# Patient Record
Sex: MALE | Race: WHITE | Employment: OTHER | URBAN - METROPOLITAN AREA
[De-identification: names, ages, dates, MRNs, and addresses within clinical notes are randomized per-mention and may not be internally consistent; named-entity substitution may affect disease eponyms.]

---

## 2022-01-25 ENCOUNTER — HOSPITAL ENCOUNTER (INPATIENT)
Age: 87
LOS: 1 days | Discharge: ANOTHER HOSPITAL | DRG: 682 | End: 2022-01-26
Attending: EMERGENCY MEDICINE | Admitting: HOSPITALIST
Payer: MEDICARE

## 2022-01-25 DIAGNOSIS — N17.9 ACUTE KIDNEY INJURY (HCC): ICD-10-CM

## 2022-01-25 DIAGNOSIS — E87.5 ACUTE HYPERKALEMIA: Primary | ICD-10-CM

## 2022-01-25 PROBLEM — I10 HYPERTENSION: Status: ACTIVE | Noted: 2022-01-25

## 2022-01-25 PROBLEM — E11.9 DIABETES MELLITUS TYPE 2, CONTROLLED (HCC): Status: ACTIVE | Noted: 2022-01-25

## 2022-01-25 LAB
ALBUMIN SERPL-MCNC: 3.8 G/DL (ref 3.2–4.6)
ALBUMIN/GLOB SERPL: 1.1 {RATIO} (ref 1.2–3.5)
ALP SERPL-CCNC: 56 U/L (ref 50–136)
ALT SERPL-CCNC: 23 U/L (ref 12–65)
ANION GAP SERPL CALC-SCNC: 4 MMOL/L (ref 7–16)
APPEARANCE UR: CLEAR
AST SERPL-CCNC: 27 U/L (ref 15–37)
BILIRUB SERPL-MCNC: 0.2 MG/DL (ref 0.2–1.1)
BILIRUB UR QL: NEGATIVE
BUN SERPL-MCNC: 48 MG/DL (ref 8–23)
CALCIUM SERPL-MCNC: 8.8 MG/DL (ref 8.3–10.4)
CHLORIDE SERPL-SCNC: 115 MMOL/L (ref 98–107)
CO2 SERPL-SCNC: 20 MMOL/L (ref 21–32)
COLOR UR: YELLOW
CREAT SERPL-MCNC: 2.1 MG/DL (ref 0.8–1.5)
GLOBULIN SER CALC-MCNC: 3.4 G/DL (ref 2.3–3.5)
GLUCOSE BLD STRIP.AUTO-MCNC: 156 MG/DL (ref 65–100)
GLUCOSE BLD STRIP.AUTO-MCNC: 163 MG/DL (ref 65–100)
GLUCOSE BLD STRIP.AUTO-MCNC: 69 MG/DL (ref 65–100)
GLUCOSE SERPL-MCNC: 216 MG/DL (ref 65–100)
GLUCOSE UR STRIP.AUTO-MCNC: NEGATIVE MG/DL
HGB UR QL STRIP: NEGATIVE
KETONES UR QL STRIP.AUTO: NEGATIVE MG/DL
LEUKOCYTE ESTERASE UR QL STRIP.AUTO: NEGATIVE
NITRITE UR QL STRIP.AUTO: NEGATIVE
PH UR STRIP: 5.5 [PH] (ref 5–9)
POTASSIUM SERPL-SCNC: 6.7 MMOL/L (ref 3.5–5.1)
PROT SERPL-MCNC: 7.2 G/DL (ref 6.3–8.2)
PROT UR STRIP-MCNC: NEGATIVE MG/DL
SERVICE CMNT-IMP: ABNORMAL
SERVICE CMNT-IMP: ABNORMAL
SERVICE CMNT-IMP: NORMAL
SODIUM SERPL-SCNC: 139 MMOL/L (ref 136–145)
SP GR UR REFRACTOMETRY: 1.02 (ref 1–1.02)
UROBILINOGEN UR QL STRIP.AUTO: 0.2 EU/DL (ref 0.2–1)

## 2022-01-25 PROCEDURE — 65270000029 HC RM PRIVATE

## 2022-01-25 PROCEDURE — 96361 HYDRATE IV INFUSION ADD-ON: CPT

## 2022-01-25 PROCEDURE — 96375 TX/PRO/DX INJ NEW DRUG ADDON: CPT

## 2022-01-25 PROCEDURE — 81003 URINALYSIS AUTO W/O SCOPE: CPT

## 2022-01-25 PROCEDURE — 80053 COMPREHEN METABOLIC PANEL: CPT

## 2022-01-25 PROCEDURE — 93005 ELECTROCARDIOGRAM TRACING: CPT | Performed by: NURSE PRACTITIONER

## 2022-01-25 PROCEDURE — 74011250636 HC RX REV CODE- 250/636: Performed by: HOSPITALIST

## 2022-01-25 PROCEDURE — 94664 DEMO&/EVAL PT USE INHALER: CPT

## 2022-01-25 PROCEDURE — 74011000250 HC RX REV CODE- 250: Performed by: EMERGENCY MEDICINE

## 2022-01-25 PROCEDURE — 96372 THER/PROPH/DIAG INJ SC/IM: CPT

## 2022-01-25 PROCEDURE — 94640 AIRWAY INHALATION TREATMENT: CPT

## 2022-01-25 PROCEDURE — 82962 GLUCOSE BLOOD TEST: CPT

## 2022-01-25 PROCEDURE — 74011636637 HC RX REV CODE- 636/637: Performed by: EMERGENCY MEDICINE

## 2022-01-25 PROCEDURE — 99285 EMERGENCY DEPT VISIT HI MDM: CPT

## 2022-01-25 PROCEDURE — 96365 THER/PROPH/DIAG IV INF INIT: CPT

## 2022-01-25 PROCEDURE — 96366 THER/PROPH/DIAG IV INF ADDON: CPT

## 2022-01-25 PROCEDURE — 74011250636 HC RX REV CODE- 250/636: Performed by: NURSE PRACTITIONER

## 2022-01-25 PROCEDURE — 74011250636 HC RX REV CODE- 250/636: Performed by: EMERGENCY MEDICINE

## 2022-01-25 RX ORDER — ONDANSETRON 2 MG/ML
4 INJECTION INTRAMUSCULAR; INTRAVENOUS
Status: DISCONTINUED | OUTPATIENT
Start: 2022-01-25 | End: 2022-01-26

## 2022-01-25 RX ORDER — ACETAMINOPHEN 325 MG/1
650 TABLET ORAL
Status: DISCONTINUED | OUTPATIENT
Start: 2022-01-25 | End: 2022-01-26

## 2022-01-25 RX ORDER — POLYETHYLENE GLYCOL 3350 17 G/17G
17 POWDER, FOR SOLUTION ORAL DAILY PRN
Status: DISCONTINUED | OUTPATIENT
Start: 2022-01-25 | End: 2022-01-26

## 2022-01-25 RX ORDER — ACETAMINOPHEN 650 MG/1
650 SUPPOSITORY RECTAL
Status: DISCONTINUED | OUTPATIENT
Start: 2022-01-25 | End: 2022-01-26

## 2022-01-25 RX ORDER — HEPARIN SODIUM 5000 [USP'U]/ML
5000 INJECTION, SOLUTION INTRAVENOUS; SUBCUTANEOUS EVERY 12 HOURS
Status: DISCONTINUED | OUTPATIENT
Start: 2022-01-25 | End: 2022-01-26

## 2022-01-25 RX ORDER — CALCIUM GLUCONATE 20 MG/ML
1 INJECTION, SOLUTION INTRAVENOUS ONCE
Status: COMPLETED | OUTPATIENT
Start: 2022-01-25 | End: 2022-01-25

## 2022-01-25 RX ORDER — CALCIUM GLUCONATE 94 MG/ML
1 INJECTION, SOLUTION INTRAVENOUS ONCE
Status: DISCONTINUED | OUTPATIENT
Start: 2022-01-25 | End: 2022-01-25 | Stop reason: SDUPTHER

## 2022-01-25 RX ORDER — SODIUM BICARBONATE 1 MEQ/ML
50 SYRINGE (ML) INTRAVENOUS
Status: COMPLETED | OUTPATIENT
Start: 2022-01-25 | End: 2022-01-25

## 2022-01-25 RX ORDER — DEXTROSE MONOHYDRATE 100 MG/ML
125-250 INJECTION, SOLUTION INTRAVENOUS ONCE
Status: COMPLETED | OUTPATIENT
Start: 2022-01-25 | End: 2022-01-25

## 2022-01-25 RX ORDER — ONDANSETRON 4 MG/1
4 TABLET, ORALLY DISINTEGRATING ORAL
Status: DISCONTINUED | OUTPATIENT
Start: 2022-01-25 | End: 2022-01-26

## 2022-01-25 RX ORDER — INSULIN LISPRO 100 [IU]/ML
INJECTION, SOLUTION INTRAVENOUS; SUBCUTANEOUS
Status: DISCONTINUED | OUTPATIENT
Start: 2022-01-26 | End: 2022-01-26

## 2022-01-25 RX ORDER — ALBUTEROL SULFATE 0.83 MG/ML
5 SOLUTION RESPIRATORY (INHALATION) EVERY 8 HOURS
Status: DISCONTINUED | OUTPATIENT
Start: 2022-01-25 | End: 2022-01-26 | Stop reason: HOSPADM

## 2022-01-25 RX ORDER — SODIUM CHLORIDE 9 MG/ML
100 INJECTION, SOLUTION INTRAVENOUS CONTINUOUS
Status: DISCONTINUED | OUTPATIENT
Start: 2022-01-25 | End: 2022-01-26

## 2022-01-25 RX ORDER — SODIUM CHLORIDE 0.9 % (FLUSH) 0.9 %
5-40 SYRINGE (ML) INJECTION AS NEEDED
Status: DISCONTINUED | OUTPATIENT
Start: 2022-01-25 | End: 2022-01-26

## 2022-01-25 RX ORDER — SODIUM CHLORIDE 0.9 % (FLUSH) 0.9 %
5-40 SYRINGE (ML) INJECTION EVERY 8 HOURS
Status: DISCONTINUED | OUTPATIENT
Start: 2022-01-25 | End: 2022-01-26

## 2022-01-25 RX ADMIN — SODIUM BICARBONATE 50 MEQ: 84 INJECTION, SOLUTION INTRAVENOUS at 19:31

## 2022-01-25 RX ADMIN — SODIUM CHLORIDE 1000 ML: 900 INJECTION, SOLUTION INTRAVENOUS at 19:36

## 2022-01-25 RX ADMIN — ALBUTEROL SULFATE 5 MG: 2.5 SOLUTION RESPIRATORY (INHALATION) at 20:28

## 2022-01-25 RX ADMIN — HUMAN INSULIN 10 UNITS: 100 INJECTION, SOLUTION SUBCUTANEOUS at 19:34

## 2022-01-25 RX ADMIN — HEPARIN SODIUM 5000 UNITS: 5000 INJECTION, SOLUTION INTRAVENOUS; SUBCUTANEOUS at 22:48

## 2022-01-25 RX ADMIN — CALCIUM GLUCONATE 1000 MG: 20 INJECTION, SOLUTION INTRAVENOUS at 20:47

## 2022-01-25 RX ADMIN — SODIUM CHLORIDE 100 ML/HR: 900 INJECTION, SOLUTION INTRAVENOUS at 22:47

## 2022-01-25 RX ADMIN — DEXTROSE MONOHYDRATE 250 ML: 100 INJECTION, SOLUTION INTRAVENOUS at 19:08

## 2022-01-25 NOTE — ED NOTES
Shalini Briseno is an 81yoM, here for elevated potassium of 7.5, noted on blood work at PCP today. Reports feeling weak and fatigued last week at work. Also had blood in his urine about 10 days ago. Otherwise no complaints today, no palpitations or CP. Well appearing, normal vitals. Patient evaluated initially in triage. Rapid Medical Evaluation was conducted and necessary orders have been placed. I have performed a medical screening exam.  Care will now be transferred to the provider in the back of the emergency department.   Jw Hill NP 6:17 PM

## 2022-01-26 ENCOUNTER — HOSPITAL ENCOUNTER (INPATIENT)
Age: 87
LOS: 2 days | Discharge: HOME HEALTH | DRG: 682 | End: 2022-01-28
Attending: HOSPITALIST | Admitting: HOSPITALIST
Payer: MEDICARE

## 2022-01-26 VITALS
HEIGHT: 72 IN | BODY MASS INDEX: 24.38 KG/M2 | HEART RATE: 91 BPM | DIASTOLIC BLOOD PRESSURE: 62 MMHG | TEMPERATURE: 98 F | OXYGEN SATURATION: 95 % | RESPIRATION RATE: 19 BRPM | SYSTOLIC BLOOD PRESSURE: 128 MMHG | WEIGHT: 180 LBS

## 2022-01-26 PROBLEM — D64.9 NORMOCYTIC ANEMIA: Status: ACTIVE | Noted: 2022-01-26

## 2022-01-26 LAB
ALBUMIN SERPL-MCNC: 3.2 G/DL (ref 3.2–4.6)
ALBUMIN/GLOB SERPL: 1 {RATIO} (ref 1.2–3.5)
ALP SERPL-CCNC: 43 U/L (ref 50–136)
ALT SERPL-CCNC: 22 U/L (ref 12–65)
ANION GAP SERPL CALC-SCNC: 6 MMOL/L (ref 7–16)
AST SERPL-CCNC: 23 U/L (ref 15–37)
ATRIAL RATE: 79 BPM
BASOPHILS # BLD: 0 K/UL (ref 0–0.2)
BASOPHILS NFR BLD: 1 % (ref 0–2)
BILIRUB SERPL-MCNC: 0.3 MG/DL (ref 0.2–1.1)
BUN SERPL-MCNC: 39 MG/DL (ref 8–23)
CALCIUM SERPL-MCNC: 8.4 MG/DL (ref 8.3–10.4)
CALCULATED P AXIS, ECG09: 82 DEGREES
CALCULATED R AXIS, ECG10: 72 DEGREES
CALCULATED T AXIS, ECG11: 77 DEGREES
CHLORIDE SERPL-SCNC: 115 MMOL/L (ref 98–107)
CO2 SERPL-SCNC: 20 MMOL/L (ref 21–32)
CREAT SERPL-MCNC: 1.58 MG/DL (ref 0.8–1.5)
DIAGNOSIS, 93000: NORMAL
DIFFERENTIAL METHOD BLD: ABNORMAL
EOSINOPHIL # BLD: 0.2 K/UL (ref 0–0.8)
EOSINOPHIL NFR BLD: 3 % (ref 0.5–7.8)
ERYTHROCYTE [DISTWIDTH] IN BLOOD BY AUTOMATED COUNT: 14.9 % (ref 11.9–14.6)
GLOBULIN SER CALC-MCNC: 3.2 G/DL (ref 2.3–3.5)
GLUCOSE BLD STRIP.AUTO-MCNC: 119 MG/DL (ref 65–100)
GLUCOSE BLD STRIP.AUTO-MCNC: 158 MG/DL (ref 65–100)
GLUCOSE SERPL-MCNC: 104 MG/DL (ref 65–100)
HCT VFR BLD AUTO: 30.4 % (ref 41.1–50.3)
HGB BLD-MCNC: 9.5 G/DL (ref 13.6–17.2)
IMM GRANULOCYTES # BLD AUTO: 0.1 K/UL (ref 0–0.5)
IMM GRANULOCYTES NFR BLD AUTO: 2 % (ref 0–5)
LYMPHOCYTES # BLD: 1.1 K/UL (ref 0.5–4.6)
LYMPHOCYTES NFR BLD: 21 % (ref 13–44)
MAGNESIUM SERPL-MCNC: 2.1 MG/DL (ref 1.8–2.4)
MCH RBC QN AUTO: 30.3 PG (ref 26.1–32.9)
MCHC RBC AUTO-ENTMCNC: 31.3 G/DL (ref 31.4–35)
MCV RBC AUTO: 96.8 FL (ref 79.6–97.8)
MONOCYTES # BLD: 0.5 K/UL (ref 0.1–1.3)
MONOCYTES NFR BLD: 9 % (ref 4–12)
NEUTS SEG # BLD: 3.3 K/UL (ref 1.7–8.2)
NEUTS SEG NFR BLD: 64 % (ref 43–78)
NRBC # BLD: 0 K/UL (ref 0–0.2)
P-R INTERVAL, ECG05: 166 MS
PLATELET # BLD AUTO: 151 K/UL (ref 150–450)
PMV BLD AUTO: 10.2 FL (ref 9.4–12.3)
POTASSIUM SERPL-SCNC: 6 MMOL/L (ref 3.5–5.1)
PROT SERPL-MCNC: 6.4 G/DL (ref 6.3–8.2)
Q-T INTERVAL, ECG07: 324 MS
QRS DURATION, ECG06: 84 MS
QTC CALCULATION (BEZET), ECG08: 371 MS
RBC # BLD AUTO: 3.14 M/UL (ref 4.23–5.6)
SERVICE CMNT-IMP: ABNORMAL
SERVICE CMNT-IMP: ABNORMAL
SODIUM SERPL-SCNC: 141 MMOL/L (ref 136–145)
VENTRICULAR RATE, ECG03: 79 BPM
WBC # BLD AUTO: 5.2 K/UL (ref 4.3–11.1)

## 2022-01-26 PROCEDURE — 80053 COMPREHEN METABOLIC PANEL: CPT

## 2022-01-26 PROCEDURE — 74011636637 HC RX REV CODE- 636/637: Performed by: INTERNAL MEDICINE

## 2022-01-26 PROCEDURE — 82962 GLUCOSE BLOOD TEST: CPT

## 2022-01-26 PROCEDURE — 74011000250 HC RX REV CODE- 250: Performed by: EMERGENCY MEDICINE

## 2022-01-26 PROCEDURE — 85025 COMPLETE CBC W/AUTO DIFF WBC: CPT

## 2022-01-26 PROCEDURE — 94760 N-INVAS EAR/PLS OXIMETRY 1: CPT

## 2022-01-26 PROCEDURE — 74011000250 HC RX REV CODE- 250: Performed by: INTERNAL MEDICINE

## 2022-01-26 PROCEDURE — 65660000000 HC RM CCU STEPDOWN

## 2022-01-26 PROCEDURE — 83735 ASSAY OF MAGNESIUM: CPT

## 2022-01-26 PROCEDURE — 96361 HYDRATE IV INFUSION ADD-ON: CPT

## 2022-01-26 PROCEDURE — 74011250636 HC RX REV CODE- 250/636: Performed by: INTERNAL MEDICINE

## 2022-01-26 PROCEDURE — 94640 AIRWAY INHALATION TREATMENT: CPT

## 2022-01-26 PROCEDURE — 86580 TB INTRADERMAL TEST: CPT | Performed by: INTERNAL MEDICINE

## 2022-01-26 RX ORDER — ONDANSETRON 2 MG/ML
4 INJECTION INTRAMUSCULAR; INTRAVENOUS
Status: CANCELLED | OUTPATIENT
Start: 2022-01-26

## 2022-01-26 RX ORDER — INSULIN LISPRO 100 [IU]/ML
INJECTION, SOLUTION INTRAVENOUS; SUBCUTANEOUS
Status: DISCONTINUED | OUTPATIENT
Start: 2022-01-26 | End: 2022-01-28 | Stop reason: HOSPADM

## 2022-01-26 RX ORDER — SODIUM CHLORIDE 0.9 % (FLUSH) 0.9 %
5-40 SYRINGE (ML) INJECTION EVERY 8 HOURS
Status: CANCELLED | OUTPATIENT
Start: 2022-01-26

## 2022-01-26 RX ORDER — HEPARIN SODIUM 5000 [USP'U]/ML
5000 INJECTION, SOLUTION INTRAVENOUS; SUBCUTANEOUS EVERY 12 HOURS
Status: CANCELLED | OUTPATIENT
Start: 2022-01-26

## 2022-01-26 RX ORDER — ACETAMINOPHEN 325 MG/1
650 TABLET ORAL
Status: CANCELLED | OUTPATIENT
Start: 2022-01-26

## 2022-01-26 RX ORDER — ONDANSETRON 2 MG/ML
4 INJECTION INTRAMUSCULAR; INTRAVENOUS
Status: DISCONTINUED | OUTPATIENT
Start: 2022-01-26 | End: 2022-01-28 | Stop reason: HOSPADM

## 2022-01-26 RX ORDER — SODIUM CHLORIDE 0.9 % (FLUSH) 0.9 %
5-40 SYRINGE (ML) INJECTION EVERY 8 HOURS
Status: DISCONTINUED | OUTPATIENT
Start: 2022-01-26 | End: 2022-01-28 | Stop reason: HOSPADM

## 2022-01-26 RX ORDER — SODIUM CHLORIDE 0.9 % (FLUSH) 0.9 %
5-40 SYRINGE (ML) INJECTION AS NEEDED
Status: DISCONTINUED | OUTPATIENT
Start: 2022-01-26 | End: 2022-01-28 | Stop reason: HOSPADM

## 2022-01-26 RX ORDER — SODIUM CHLORIDE 0.9 % (FLUSH) 0.9 %
5-40 SYRINGE (ML) INJECTION AS NEEDED
Status: CANCELLED | OUTPATIENT
Start: 2022-01-26

## 2022-01-26 RX ORDER — SODIUM CHLORIDE 9 MG/ML
75 INJECTION, SOLUTION INTRAVENOUS CONTINUOUS
Status: DISCONTINUED | OUTPATIENT
Start: 2022-01-26 | End: 2022-01-28 | Stop reason: HOSPADM

## 2022-01-26 RX ORDER — ONDANSETRON 8 MG/1
4 TABLET, ORALLY DISINTEGRATING ORAL
Status: DISCONTINUED | OUTPATIENT
Start: 2022-01-26 | End: 2022-01-28 | Stop reason: HOSPADM

## 2022-01-26 RX ORDER — ACETAMINOPHEN 650 MG/1
650 SUPPOSITORY RECTAL
Status: CANCELLED | OUTPATIENT
Start: 2022-01-26

## 2022-01-26 RX ORDER — INSULIN LISPRO 100 [IU]/ML
INJECTION, SOLUTION INTRAVENOUS; SUBCUTANEOUS
Status: CANCELLED | OUTPATIENT
Start: 2022-01-26

## 2022-01-26 RX ORDER — SODIUM CHLORIDE 9 MG/ML
100 INJECTION, SOLUTION INTRAVENOUS CONTINUOUS
Status: CANCELLED | OUTPATIENT
Start: 2022-01-26

## 2022-01-26 RX ORDER — HEPARIN SODIUM 5000 [USP'U]/ML
5000 INJECTION, SOLUTION INTRAVENOUS; SUBCUTANEOUS EVERY 12 HOURS
Status: DISCONTINUED | OUTPATIENT
Start: 2022-01-26 | End: 2022-01-28 | Stop reason: HOSPADM

## 2022-01-26 RX ORDER — ONDANSETRON 4 MG/1
4 TABLET, ORALLY DISINTEGRATING ORAL
Status: CANCELLED | OUTPATIENT
Start: 2022-01-26

## 2022-01-26 RX ORDER — POLYETHYLENE GLYCOL 3350 17 G/17G
17 POWDER, FOR SOLUTION ORAL DAILY PRN
Status: CANCELLED | OUTPATIENT
Start: 2022-01-26

## 2022-01-26 RX ORDER — ACETAMINOPHEN 650 MG/1
650 SUPPOSITORY RECTAL
Status: DISCONTINUED | OUTPATIENT
Start: 2022-01-26 | End: 2022-01-28 | Stop reason: HOSPADM

## 2022-01-26 RX ORDER — POLYETHYLENE GLYCOL 3350 17 G/17G
17 POWDER, FOR SOLUTION ORAL DAILY PRN
Status: DISCONTINUED | OUTPATIENT
Start: 2022-01-26 | End: 2022-01-28 | Stop reason: HOSPADM

## 2022-01-26 RX ORDER — ACETAMINOPHEN 325 MG/1
650 TABLET ORAL
Status: DISCONTINUED | OUTPATIENT
Start: 2022-01-26 | End: 2022-01-28 | Stop reason: HOSPADM

## 2022-01-26 RX ADMIN — SODIUM CHLORIDE 100 ML/HR: 900 INJECTION, SOLUTION INTRAVENOUS at 18:20

## 2022-01-26 RX ADMIN — ALBUTEROL SULFATE 5 MG: 2.5 SOLUTION RESPIRATORY (INHALATION) at 14:19

## 2022-01-26 RX ADMIN — TUBERCULIN PURIFIED PROTEIN DERIVATIVE 5 UNITS: 5 INJECTION, SOLUTION INTRADERMAL at 16:37

## 2022-01-26 RX ADMIN — INSULIN LISPRO 2 UNITS: 100 INJECTION, SOLUTION INTRAVENOUS; SUBCUTANEOUS at 21:24

## 2022-01-26 RX ADMIN — SODIUM CHLORIDE, PRESERVATIVE FREE 10 ML: 5 INJECTION INTRAVENOUS at 21:27

## 2022-01-26 NOTE — H&P
Hospitalist History and Physical   Admit Date:  2022  7:12 PM   Name:  Kalee Romero   Age:  81 y.o. Sex:  male  :  1935   MRN:  [id number]     Presenting Complaint: Fatigue    Reason(s) for Admission: Acute kidney injury ([facility] [address] [N17.9]  Hyperkalemia [E87.5]     History of Present Illness:     81years old  male with history of hypertension, diabetes type 2, dyslipidemia presented to emergency room with chief complaint of progressing weakness going on for past 10 days. Patient was evaluated by primary care physician, lab work shows evidence of elevated creatinine and elevated potassium, patient was sent to ER for further evaluation. Patient reports experiencing hematuria almost 2 weeks ago, went to urgent care where patient was evaluated and provided Bactrim for UTI, patient reports symptoms of generalized weakness got much worse since time. Patient reports working at [employer] part-time job. Denies any fever, chills, cough, sore throat, runny nose. Since Bactrim use patient hematuria completely resolved but for experiencing generalized weakness and lack of energy. .          Review of Systems:  10 systems reviewed and negative except as noted in HPI. Assessment & Plan:   Principal Problem:    Acute kidney injury ([facility] [address] (2022): Initiated on IV fluids, monitor creatinine. Urinalysis is still pending at this time. Suspect underlying interstitial nephritis. Will check urinalysis for admission. Active Problems:    Hyperkalemia (2022)  Related to acute kidney injury, patient was given IV fluids      Diabetes mellitus type 2, controlled ([facility] [address] (2022)  Placed on sliding scale insulin, hold Metformin. Hypertension (2022)  Hold lisinopril until creatinine improves. Disposition/Discharge Planning: Likely home with family. Diet: ADULT DIET Regular  VTE ppx:  Subcu heparin  Code status: Full Code      Past Medical History:   Diagnosis Date    Diabetes ([facility] [address]  Hypertension      Past surgical history: History of right shoulder and knee surgery. No Known Allergies   Social History     Tobacco Use    Smoking status: Never Smoker    Smokeless tobacco: Never Used   Substance Use Topics    Alcohol use: No      History reviewed. No pertinent family history. Family history reviewed and noncontributory to patient's acute condition; no relevant family history unless otherwise noted above. There is no immunization history on file for this patient. PTA Medications:  Current Outpatient Medications   Medication Instructions    metFORMIN (GLUCOPHAGE) 500 mg tablet 2 TIMES DAILY WITH MEALS    OTHER         Objective:     Patient Vitals for the past 24 hrs:   Temp Pulse Resp BP SpO2   01/25/22 2028     99 %   01/25/22 1816 97.5 °F (36.4 °C) 92 16 (!) 145/59 99 %     Oxygen Therapy  O2 Sat (%): 99 % (01/25/22 2028)  Pulse via Oximetry: 87 beats per minute (01/25/22 2028)  O2 Device: None (Room air) (01/25/22 2127)    Estimated body mass index is 24.41 kg/m² as calculated from the following:    Height as of this encounter: 6' (1.829 m). Weight as of this encounter: 81.6 kg (180 lb). No intake or output data in the 24 hours ending 01/25/22 2213      Physical Exam:    General:    Well nourished. No overt distress  Head:  Normocephalic, atraumatic  Eyes:  Sclerae appear normal.  Pupils equally round. HENT:  Nares appear normal, no drainage. Moist mucous membranes  Neck:  No restricted ROM. Trachea midline  CV:   RRR. No m/r/g. No JVD  Lungs:   CTAB. No wheezing, rhonchi, or rales. Appears even, unlabored  Abdomen: Bowel sounds present. Soft, nontender, nondistended. Extremities: Warm and dry. No cyanosis or clubbing. No edema. Skin:     No rashes. Normal turgor.   Normal coloration  Neuro:    Alert and oriented x3    Data Ordered and Personally Reviewed:    Last 24hr Labs:  Recent Results (from the past 24 hour(s))   EKG, 12 LEAD, INITIAL Collection Time: 01/25/22  6:20 PM   Result Value Ref Range    Ventricular Rate 79 BPM    Atrial Rate 79 BPM    P-R Interval 166 ms    QRS Duration 84 ms    Q-T Interval 324 ms    QTC Calculation (Bezet) 371 ms    Calculated P Axis 82 degrees    Calculated R Axis 72 degrees    Calculated T Axis 77 degrees    Diagnosis       Normal sinus rhythm  Possible Anterior infarct , age undetermined  Abnormal ECG  No previous ECGs available     METABOLIC PANEL, COMPREHENSIVE    Collection Time: 01/25/22  6:26 PM   Result Value Ref Range    Sodium 139 136 - 145 mmol/L    Potassium 6.7 (HH) 3.5 - 5.1 mmol/L    Chloride 115 (H) 98 - 107 mmol/L    CO2 20 (L) 21 - 32 mmol/L    Anion gap 4 (L) 7 - 16 mmol/L    Glucose 216 (H) 65 - 100 mg/dL    BUN 48 (H) 8 - 23 MG/DL    Creatinine 2.10 (H) 0.8 - 1.5 MG/DL    GFR est AA 39 (L) >60 ml/min/1.73m2    GFR est non-AA 32 (L) >60 ml/min/1.73m2    Calcium 8.8 8.3 - 10.4 MG/DL    Bilirubin, total 0.2 0.2 - 1.1 MG/DL    ALT (SGPT) 23 12 - 65 U/L    AST (SGOT) 27 15 - 37 U/L    Alk. phosphatase 56 50 - 136 U/L    Protein, total 7.2 6.3 - 8.2 g/dL    Albumin 3.8 3.2 - 4.6 g/dL    Globulin 3.4 2.3 - 3.5 g/dL    A-G Ratio 1.1 (L) 1.2 - 3.5     GLUCOSE, POC    Collection Time: 01/25/22  7:31 PM   Result Value Ref Range    Glucose (POC) 156 (H) 65 - 100 mg/dL    Performed by [facility]    GLUCOSE, POC    Collection Time: 01/25/22  8:52 PM   Result Value Ref Range    Glucose (POC) 69 65 - 100 mg/dL    Performed by [facility]    GLUCOSE, POC    Collection Time: 01/25/22  9:46 PM   Result Value Ref Range    Glucose (POC) 163 (H) 65 - 100 mg/dL    Performed by [facility]        All Micro Results     None          Other Studies:  No results found.       Medications Administered     albuterol (PROVENTIL VENTOLIN) nebulizer solution 5 mg     Admin Date  01/25/2022 Action  Given Dose  5 mg Route  Nebulization Administered By  RT Moshe calcium gluconate 1 gram in sodium chloride (ISO-OSM) 50 mL infusion     Admin Date  01/25/2022 Action  New Bag Dose  1,000 mg Rate  50 mL/hr Route  IntraVENous Administered By  Fly Rust RN          dextrose 10% infusion 125-250 mL     Admin Date  01/25/2022 Action  New Bag Dose  250 mL Route  IntraVENous Administered By  Fly Rust RN          insulin regular (NOVOLIN R, HUMULIN R) injection 10 Units     Admin Date  01/25/2022 Action  Given Dose  10 Units Route  IntraVENous Administered By  Fly Rust RN          sodium bicarbonate 8.4 % (1 mEq/mL) injection 50 mEq     Admin Date  01/25/2022 Action  Given Dose  50 mEq Route  IntraVENous Administered By  Fly Rust RN          sodium chloride 0.9 % bolus infusion 1,000 mL     Admin Date  01/25/2022 Action  New Bag Dose  1,000 mL Rate  1,000 mL/hr Route  IntraVENous Administered By  Fly Rust RN                  Signed:  Kush Styles MD    Part of this note may have been written by using a voice dictation software. The note has been proof read but may still contain some grammatical/other typographical errors.

## 2022-01-26 NOTE — PROGRESS NOTES
01/26/22 1725   Dual Skin Pressure Injury Assessment   Dual Skin Pressure Injury Assessment WDL   Second Care Provider (Based on [address] [place] [address]) Candida Ha RN   Skin Integumentary   Skin Integumentary (WDL) X   Skin Integrity Abrasion;Scars (comment)   Wound Prevention and Protection Methods   Orientation of Wound Prevention Posterior   Location of Wound Prevention Sacrum/Coccyx   Dressing Present  No   Wound Offloading (Prevention Methods) Bed, pressure reduction mattress

## 2022-01-26 NOTE — ED NOTES
Pt sister at bedside and updated her with pt care. Pt continues to wait for room assignment. Denies complaints at present.

## 2022-01-26 NOTE — ED NOTES
BGL of 69 on hour recheck. Dr. Arreguin Presumnatacha notified and pt given food/beverage containing sugar per MD request. Pt instructed to eat/drink to increase sugar. Pt verbalized understanding.

## 2022-01-26 NOTE — ED NOTES
TRANSFER - OUT REPORT:    Verbal report given to Carolann Phan RN on Susan Sears  being transferred to [facility] for routine progression of care       Report consisted of patients Situation, Background, Assessment and   Recommendations(SBAR). Information from the following report(s) ED Summary was reviewed with the receiving nurse. Lines:   Peripheral IV 01/25/22 Right Antecubital (Active)        Opportunity for questions and clarification was provided.       Patient transported with: Miguel White

## 2022-01-26 NOTE — H&P
Please see Discharge Summary from 1/26/22 and H&P from 1/25/22 for full HPI, physical exam, and A/P. Plan:  IVFs  Check BMP  Discharge tomorrow if potassium and creatinine improved.     Anai Martinez, DO

## 2022-01-26 NOTE — PROGRESS NOTES
Care Management Interventions  PCP Verified by CM: Yes ([phone])  Mode of Transport at Discharge:  (family)  Transition of Care Consult (CM Consult): Discharge Planning  Support Systems: Other Family Member(s) (Sister/Sweta #[phone])  Confirm Follow Up Transport: Family  The Patient and/or Patient Representative was Provided with a Choice of Provider and Agrees with the Discharge Plan?: Yes  Freedom of Choice List was Provided with Basic Dialogue that Supports the Patient's Individualized Plan of Care/Goals, Treatment Preferences and Shares the Quality Data Associated with the Providers?: Yes  Discharge Location  Patient Expects to be Discharged to[de-identified] Unable to determine at this time  Visited with pt to establish prior to admission baseline and discuss their anticipated goals at time of d/c. Pt lives at home alone, inde with ADL's, in his 1-story home. He has good neighbors or his sister/Sweta who he can call if needed. Pt Rx are filled at [facility] on [address], Pt/OT/PPD orders written and CM to follow.

## 2022-01-26 NOTE — ED PROVIDER NOTES
50-year-old  male with history of HTN, type 2 diabetes, and hypercholesterolemia presents to the emergency department complaining of progressive fatigue over the last week and was told to come in by his family doctor after their lab work revealed elevated potassium. According the patient, about 9 days ago he went to urgent care for noting some blood in his urine and was diagnosed with UTI and placed on Bactrim DS. He seemed to get a little better with that initially and his urine cleared, but over the last few days he has felt much worse, and yesterday did not and go into his part-time job at [employer] because he felt so poorly. This morning he had lab work done and was sent here subsequent to the results. The history is provided by the patient. Fatigue  This is a new problem. The current episode started more than 2 days ago. The problem has been gradually worsening. There was no focality noted. Pertinent negatives include no focal weakness, no loss of sensation, no loss of balance, no slurred speech, no speech difficulty, no memory loss, no movement disorder, no agitation, no visual change, no auditory change, no mental status change, no unresponsiveness and no disorientation. There has been no fever. Pertinent negatives include no shortness of breath, no chest pain, no vomiting, no altered mental status, no confusion, no headaches, no choking, no nausea, no bowel incontinence and no bladder incontinence. There were no medications administered prior to arrival. Associated medical issues do not include trauma, mood changes, bleeding disorder, seizures, dementia, CVA or clotting disorder. Past Medical History:   Diagnosis Date    Diabetes ([facility] [address]     Hypertension        History reviewed. No pertinent surgical history. History reviewed. No pertinent family history.     Social History     Socioeconomic History    Marital status:      Spouse name: Not on file    Number of children: Not on file    Years of education: Not on file    Highest education level: Not on file   Occupational History    Not on file   Tobacco Use    Smoking status: Never Smoker    Smokeless tobacco: Never Used   Substance and Sexual Activity    Alcohol use: No    Drug use: No    Sexual activity: Not on file   Other Topics Concern    Not on file   Social History Narrative    Not on file     Social Determinants of Health     Financial Resource Strain:     Difficulty of Paying Living Expenses: Not on file   Food Insecurity:     Worried About Running Out of Food in the Last Year: Not on file    Julio of Food in the Last Year: Not on file   Transportation Needs:     Lack of Transportation (Medical): Not on file    Lack of Transportation (Non-Medical): Not on file   Physical Activity:     Days of Exercise per Week: Not on file    Minutes of Exercise per Session: Not on file   Stress:     Feeling of Stress : Not on file   Social Connections:     Frequency of Communication with Friends and Family: Not on file    Frequency of Social Gatherings with Friends and Family: Not on file    Attends [employer] Services: Not on file    Active Member of [address] [place] [address] or Organizations: Not on file    Attends Club or Organization Meetings: Not on file    Marital Status: Not on file   Intimate Partner Violence:     Fear of Current or Ex-Partner: Not on file    Emotionally Abused: Not on file    Physically Abused: Not on file    Sexually Abused: Not on file   Housing Stability:     Unable to Pay for Housing in the Last Year: Not on file    Number of [place] in the Last Year: Not on file    Unstable Housing in the Last Year: Not on file         ALLERGIES: Patient has no known allergies. Review of Systems   Constitutional: Positive for activity change and fatigue. Negative for chills, diaphoresis and fever. HENT: Negative for congestion. Respiratory: Negative for choking and shortness of breath.     Cardiovascular: Negative for chest pain. Gastrointestinal: Negative for abdominal pain, bowel incontinence, constipation, diarrhea, nausea and vomiting. Genitourinary: Negative for bladder incontinence, dysuria and frequency. Neurological: Negative for focal weakness, speech difficulty, headaches and loss of balance. Psychiatric/Behavioral: Negative for agitation, confusion and memory loss. All other systems reviewed and are negative. Vitals:    01/25/22 1816 01/25/22 2028   BP: (!) 145/59    Pulse: 92    Resp: 16    Temp: 97.5 °F (36.4 °C)    SpO2: 99% 99%   Weight: 81.6 kg (180 lb)    Height: 6' (1.829 m)             Physical Exam  Vitals and nursing note reviewed. Constitutional:       General: He is not in acute distress. Appearance: He is well-developed. HENT:      Head: Normocephalic and atraumatic. Right Ear: External ear normal.      Left Ear: External ear normal.   Eyes:      Extraocular Movements: Extraocular movements intact. Conjunctiva/sclera: Conjunctivae normal.      Pupils: Pupils are equal, round, and reactive to light. Cardiovascular:      Rate and Rhythm: Normal rate and regular rhythm. Heart sounds: Normal heart sounds. No murmur heard. Pulmonary:      Effort: Pulmonary effort is normal.      Breath sounds: Normal breath sounds. Abdominal:      General: Bowel sounds are normal.      Palpations: Abdomen is soft. Tenderness: There is no abdominal tenderness. Musculoskeletal:         General: Normal range of motion. Cervical back: Normal range of motion and neck supple. Skin:     General: Skin is warm and dry. Capillary Refill: Capillary refill takes less than 2 seconds. Neurological:      General: No focal deficit present. Mental Status: He is alert and oriented to person, place, and time.    Psychiatric:         Mood and Affect: Mood normal.         Behavior: Behavior normal.          MDM  Number of Diagnoses or Management Options  Acute hyperkalemia: new and requires workup  Acute kidney injury [facility]): new and requires workup     Amount and/or Complexity of Data Reviewed  Clinical lab tests: ordered and reviewed  Tests in the radiology section of CPT®: ordered and reviewed  Tests in the medicine section of CPT®: ordered and reviewed (ECG interpretation for ECG dated 1/25/2022 at 6:20 PM: ECG reveals a normal sinus rhythm rate of 79 bpm, normal [place] and QTc intervals and normal axis. Possible old anterior infarct. Of note, T waves are rather peaked in the anterior leads consistent with possible early hyperkalemia. Abnormal ECG. Hero Presley MD  )  Review and summarize past medical records: yes  Discuss the patient with other providers: yes    Risk of Complications, Morbidity, and/or Mortality  Presenting problems: high  Diagnostic procedures: minimal  Management options: high    Patient Progress  Patient progress: improved         Procedures      The patient was observed in the ED. due to the elevated potassium, as well as the peak T waves on EKG, the patient was treated with albuterol, insulin and D50, bicarbonate, and calcium for his hyperkalemia. There are no old labs we can obtain to give us an idea of where his normal kidney function is, but considering the patient is on lisinopril as well as Metformin, I would hope his kidney function was normal prior to the administration of Bactrim. Case will be discussed with hospitalist who will admit.     Results Reviewed:      Recent Results (from the past 24 hour(s))   EKG, 12 LEAD, INITIAL    Collection Time: 01/25/22  6:20 PM   Result Value Ref Range    Ventricular Rate 79 BPM    Atrial Rate 79 BPM    P-R Interval 166 ms    QRS Duration 84 ms    Q-T Interval 324 ms    QTC Calculation (Bezet) 371 ms    Calculated P Axis 82 degrees    Calculated R Axis 72 degrees    Calculated T Axis 77 degrees    Diagnosis       Normal sinus rhythm  Possible Anterior infarct , age undetermined  Abnormal ECG  No previous ECGs available     METABOLIC PANEL, COMPREHENSIVE    Collection Time: 01/25/22  6:26 PM   Result Value Ref Range    Sodium 139 136 - 145 mmol/L    Potassium 6.7 (HH) 3.5 - 5.1 mmol/L    Chloride 115 (H) 98 - 107 mmol/L    CO2 20 (L) 21 - 32 mmol/L    Anion gap 4 (L) 7 - 16 mmol/L    Glucose 216 (H) 65 - 100 mg/dL    BUN 48 (H) 8 - 23 MG/DL    Creatinine 2.10 (H) 0.8 - 1.5 MG/DL    GFR est AA 39 (L) >60 ml/min/1.73m2    GFR est non-AA 32 (L) >60 ml/min/1.73m2    Calcium 8.8 8.3 - 10.4 MG/DL    Bilirubin, total 0.2 0.2 - 1.1 MG/DL    ALT (SGPT) 23 12 - 65 U/L    AST (SGOT) 27 15 - 37 U/L    Alk. phosphatase 56 50 - 136 U/L    Protein, total 7.2 6.3 - 8.2 g/dL    Albumin 3.8 3.2 - 4.6 g/dL    Globulin 3.4 2.3 - 3.5 g/dL    A-G Ratio 1.1 (L) 1.2 - 3.5     GLUCOSE, POC    Collection Time: 01/25/22  7:31 PM   Result Value Ref Range    Glucose (POC) 156 (H) 65 - 100 mg/dL    Performed by Carly[facility])        CRITICAL CARE Documentation: This patient is critically ill and there is a high probability of of imminent or life threatening deterioration in the patient's condition without immediate management. The nature of the patient's clinical problem is: Acute hyperkalemia with EKG changes, acute kidney injury    I have spent 35 minutes in direct patient care, documentation, review of labs/xrays/old records, discussion with Family, Staff, Colleague, Nursing . The time involved in the performance of separately reportable procedures was not counted toward critical care time.      Naga Shin MD; 1/25/2022 @8:41 PM

## 2022-01-26 NOTE — DISCHARGE SUMMARY
Hospitalist Discharge Summary     Patient ID:  Sadie Robles  [id number]  34 y.o.  1935  Admit date: 1/25/2022  Discharge date: 1/26/2022  Attending: Daniel Interiano DO  PCP:  None  Treatment Team: Attending Provider: Daniel Interiano DO; Hospitalist: Mark Bermudez; Primary Nurse: John Paul Hannah RN; Utilization Review: Francisco Mcdonald RN; Care Manager: Lori Pichardo RN    Principal Diagnosis Acute kidney injury [facility])    Hospital Problems as of 1/26/2022 Never Reviewed          Codes Class Noted - Resolved POA    * (Principal) [facility] ([facility] ICD-10-CM: N17.9  ICD-9-CM: 584.9  1/25/2022 - Present Yes        Hyperkalemia ICD-10-CM: E87.5  ICD-9-CM: 276.7  1/25/2022 - Present Yes        Hypertension ICD-10-CM: I10  ICD-9-CM: 401.9  1/25/2022 - Present Yes        Normocytic anemia ICD-10-CM: D64.9  ICD-9-CM: 285.9  1/26/2022 - Present Yes        Diabetes mellitus type 2, controlled ([facility] ICD-10-CM: E11.9  ICD-9-CM: 250.00  1/25/2022 - Present Yes                  Hospital Course:  Please refer to the admission H&P for details of presentation. In summary, the patient is a 81years old  male with history of hypertension, diabetes type 2, dyslipidemia presented to emergency room with chief complaint of progressing weakness going on for past 10 days. Patient was evaluated by primary care physician, lab work shows evidence of elevated creatinine and elevated potassium, patient was sent to ER for further evaluation. Patient reports experiencing hematuria almost 2 weeks ago, went to urgent care where patient was evaluated and provided Bactrim for UTI, patient reports symptoms of generalized weakness got much worse since time. Patient reports working at [employer] part-time job. Denies any fever, chills, cough, sore throat, runny nose. Since Bactrim use patient hematuria completely resolved but for experiencing generalized weakness and lack of energy.  He was admitted and started on IVFs and Albuterol. His potassium came down 6.7 to 6.0. His kidney function improved. Did Patient Have Sepsis: NO    Procedures done this admission:  * No surgery found *    Consults this admission:  None    Significant Diagnostic Studies:    Labs: Results:       Chemistry Recent Labs     01/26/22 0456 01/25/22  1826   * 216*    139   K 6.0* 6.7*   * 115*   CO2 20* 20*   BUN 39* 48*   CREA 1.58* 2.10*   CA 8.4 8.8   AGAP 6* 4*   AP 43* 56   TP 6.4 7.2   ALB 3.2 3.8   GLOB 3.2 3.4   AGRAT 1.0* 1.1*      CBC w/Diff Recent Labs     01/26/22 0456   WBC 5.2   RBC 3.14*   HGB 9.5*   HCT 30.4*      GRANS 64   LYMPH 21   EOS 3      Cardiac Enzymes No results for input(s): CPK, CKND1, IZA in the last 72 hours. No lab exists for component: CKRMB, TROIP   Coagulation No results for input(s): PTP, INR, APTT, INREXT in the last 72 hours. Lipid Panel No results found for: CHOL, CHOLPOCT, CHOLX, CHLST, CHOLV, 139968, HDL, HDLP, LDL, LDLC, DLDLP, 583634, VLDLC, VLDL, TGLX, TRIGL, TRIGP, TGLPOCT, CHHD, CHHDX   BNP No results for input(s): BNPP in the last 72 hours. Liver Enzymes Recent Labs     01/26/22 0456   TP 6.4   ALB 3.2   AP 43*      Thyroid Studies No results found for: T4, T3U, TSH, TSHEXT         Imaging:  No results found. Microbiology/Cultures: All Micro Results     None          Discharge Exam:  Visit Vitals  BP (!) 116/57   Pulse 69   Temp 97.5 °F (36.4 °C)   Resp 24   Ht 6' (1.829 m)   Wt 81.6 kg (180 lb)   SpO2 96%   BMI 24.41 kg/m²     General appearance: alert, cooperative, no distress, appears stated age  Lungs: clear to auscultation bilaterally  Heart: regular rate and rhythm, S1, S2 normal, no murmur, click, rub or gallop  Abdomen: soft, non-tender.  Bowel sounds normal. No masses,  no organomegaly  Extremities: no cyanosis or edema  Neurologic: Grossly normal    Disposition: Acute Facility   Discharge Condition: stable  Patient Instructions: Current Discharge Medication List          Follow-up:  No orders of the defined types were placed in this encounter. Follow-up Information     Follow up With Specialties Details Why Contact Info    None    None (395) Patient stated that they have no PCP            Follow up labs/diagnostics (ultimately defer to outpatient provider):  None    Plan was discussed with patient, sister, nursing. All questions answered. Patient was stable at time of discharge. Instructions given to call a physician or return if any concerns. Time spent in patient discharge and coordination 34 minutes.   Signed:  Kip Molina DO  1/26/2022  4:23 PM

## 2022-01-26 NOTE — ED NOTES
Report received from Maribel Trujillo, [place]. Assumed care of the pt.   Assisted the pt to the [facility]

## 2022-01-27 LAB
ALBUMIN SERPL-MCNC: 3.4 G/DL (ref 3.2–4.6)
ANION GAP SERPL CALC-SCNC: 4 MMOL/L (ref 7–16)
BUN SERPL-MCNC: 25 MG/DL (ref 8–23)
CALCIUM SERPL-MCNC: 9 MG/DL (ref 8.3–10.4)
CHLORIDE SERPL-SCNC: 115 MMOL/L (ref 98–107)
CO2 SERPL-SCNC: 21 MMOL/L (ref 21–32)
CREAT SERPL-MCNC: 1.24 MG/DL (ref 0.8–1.5)
GLUCOSE BLD STRIP.AUTO-MCNC: 106 MG/DL (ref 65–100)
GLUCOSE BLD STRIP.AUTO-MCNC: 139 MG/DL (ref 65–100)
GLUCOSE BLD STRIP.AUTO-MCNC: 156 MG/DL (ref 65–100)
GLUCOSE BLD STRIP.AUTO-MCNC: 190 MG/DL (ref 65–100)
GLUCOSE SERPL-MCNC: 148 MG/DL (ref 65–100)
PHOSPHATE SERPL-MCNC: 2.8 MG/DL (ref 2.3–3.7)
POTASSIUM SERPL-SCNC: 5.8 MMOL/L (ref 3.5–5.1)
SERVICE CMNT-IMP: ABNORMAL
SODIUM SERPL-SCNC: 140 MMOL/L (ref 138–145)

## 2022-01-27 PROCEDURE — 65270000029 HC RM PRIVATE

## 2022-01-27 PROCEDURE — 97165 OT EVAL LOW COMPLEX 30 MIN: CPT

## 2022-01-27 PROCEDURE — 82962 GLUCOSE BLOOD TEST: CPT

## 2022-01-27 PROCEDURE — 74011250636 HC RX REV CODE- 250/636: Performed by: INTERNAL MEDICINE

## 2022-01-27 PROCEDURE — 97116 GAIT TRAINING THERAPY: CPT

## 2022-01-27 PROCEDURE — 74011000250 HC RX REV CODE- 250: Performed by: NURSE PRACTITIONER

## 2022-01-27 PROCEDURE — 97162 PT EVAL MOD COMPLEX 30 MIN: CPT

## 2022-01-27 PROCEDURE — 80069 RENAL FUNCTION PANEL: CPT

## 2022-01-27 PROCEDURE — 74011636637 HC RX REV CODE- 636/637: Performed by: INTERNAL MEDICINE

## 2022-01-27 PROCEDURE — 97112 NEUROMUSCULAR REEDUCATION: CPT

## 2022-01-27 PROCEDURE — 74011250637 HC RX REV CODE- 250/637: Performed by: NURSE PRACTITIONER

## 2022-01-27 PROCEDURE — 74011000250 HC RX REV CODE- 250: Performed by: INTERNAL MEDICINE

## 2022-01-27 PROCEDURE — 74011250636 HC RX REV CODE- 250/636: Performed by: NURSE PRACTITIONER

## 2022-01-27 PROCEDURE — 36415 COLL VENOUS BLD VENIPUNCTURE: CPT

## 2022-01-27 RX ORDER — LABETALOL HYDROCHLORIDE 5 MG/ML
20 INJECTION, SOLUTION INTRAVENOUS ONCE
Status: COMPLETED | OUTPATIENT
Start: 2022-01-27 | End: 2022-01-27

## 2022-01-27 RX ORDER — LABETALOL HYDROCHLORIDE 5 MG/ML
20 INJECTION, SOLUTION INTRAVENOUS
Status: DISCONTINUED | OUTPATIENT
Start: 2022-01-27 | End: 2022-01-28 | Stop reason: HOSPADM

## 2022-01-27 RX ADMIN — SODIUM CHLORIDE 75 ML/HR: 900 INJECTION, SOLUTION INTRAVENOUS at 17:30

## 2022-01-27 RX ADMIN — SODIUM ZIRCONIUM CYCLOSILICATE 15 G: 10 POWDER, FOR SUSPENSION ORAL at 15:34

## 2022-01-27 RX ADMIN — INSULIN LISPRO 2 UNITS: 100 INJECTION, SOLUTION INTRAVENOUS; SUBCUTANEOUS at 21:00

## 2022-01-27 RX ADMIN — HEPARIN SODIUM 5000 UNITS: 5000 INJECTION INTRAVENOUS; SUBCUTANEOUS at 00:03

## 2022-01-27 RX ADMIN — SODIUM CHLORIDE, PRESERVATIVE FREE 10 ML: 5 INJECTION INTRAVENOUS at 15:37

## 2022-01-27 RX ADMIN — SODIUM CHLORIDE 100 ML/HR: 900 INJECTION, SOLUTION INTRAVENOUS at 00:00

## 2022-01-27 RX ADMIN — SODIUM CHLORIDE, PRESERVATIVE FREE 10 ML: 5 INJECTION INTRAVENOUS at 06:31

## 2022-01-27 RX ADMIN — SODIUM CHLORIDE, PRESERVATIVE FREE 10 ML: 5 INJECTION INTRAVENOUS at 22:00

## 2022-01-27 RX ADMIN — HEPARIN SODIUM 5000 UNITS: 5000 INJECTION INTRAVENOUS; SUBCUTANEOUS at 23:07

## 2022-01-27 RX ADMIN — HEPARIN SODIUM 5000 UNITS: 5000 INJECTION INTRAVENOUS; SUBCUTANEOUS at 10:08

## 2022-01-27 RX ADMIN — LABETALOL HYDROCHLORIDE 20 MG: 5 INJECTION INTRAVENOUS at 10:09

## 2022-01-27 RX ADMIN — INSULIN LISPRO 2 UNITS: 100 INJECTION, SOLUTION INTRAVENOUS; SUBCUTANEOUS at 11:30

## 2022-01-27 NOTE — ROUTINE PROCESS
Bedside and Verbal shift change report given to self (oncoming nurse) by  Cleveland Willingham RN (offgoing nurse). Report included the following information SBAR, Kardex, Intake/Output, MAR, and Recent Results.

## 2022-01-27 NOTE — PROGRESS NOTES
Pt found trying to get up out of bed,  IV to RAC accidentally pulled out min blood loss 2x2 applied. Pt assisted back to bed reinstrucked to use call button if he needs to get up. Verbalized agreement. Call light in reach and bed alarm set. New INT inserted in RFA.

## 2022-01-27 NOTE — PROGRESS NOTES
Hospitalist Progress Note   Admit Date:  2022  5:22 PM   Name:  Chelsi Cruz   Age:  81 y.o. Sex:  male  :  1935   MRN:  [id number]   Room:  [place] [address]    Presenting Complaint: No chief complaint on file. Reason(s) for Admission: Acute kidney injury ([facility] [N17.9]  Hyperkalemia [E87.5]     Hospital Course & Interval History:   Mr. Katiuska Coronado is an 82 y/o CM with a PMH of hypertension, DM type 2, dyslipidemia who presented to the ER with a chief complaint of progressing weakness going on for past 10 days.  Patient was evaluated by primary care physician, lab work shows evidence of elevated creatinine and elevated potassium, patient was sent to ER for further evaluation. David Henao reports experiencing hematuria almost 2 weeks ago, went to urgent care where patient was evaluated and provided Bactrim for UTI, patient reports symptoms of generalized weakness got much worse since that time.  Patient reports working at [employer] as a part-time job.  Denies any fever, chills, cough, sore throat, runny nose.  Since Bactrim use patient's hematuria completely resolved but for experiencing generalized weakness and lack of energy. He was admitted to the Hospitalist service and started on IVF and albuterol. Subjective (22): Patient seen and examined in [facility]. Participates in exam but is confused. He denies CP/SOB. K+ improved but remains elevated. Renal fx has improved nicely.     Assessment & Plan:     Principal Problem:  Acute kidney injury ([facility] (2022)  : Improved from 2.10 on admission to 1.24 now   - Can reduce IVF   - UA clean     Active Problems:  Hyperkalemia (2022)  Related to acute kidney injury  : 6.7 --> 6.0 --> 5.8   - Give another dose of SZC now   - Follow up BMP    Delirium  : Patient is mildly confused but pleasant during exam   - No focal findings, follows commands, antigravity throughout   - D/w RN; will try distraction and redirection     Diabetes mellitus type 2, controlled ([facility] (2022)  Continue correction scale lispro, hold Metformin.     Hypertension (2022)  Hold lisinopril for now w/ JOSE  : Add PRN labetalol      Dispo/Discharge Plannin-3 midnights    Diet:  ADULT DIET Regular  DVT PPx: heparin  Code status: Full Code    Hospital Problems as of 2022 Never Reviewed          Codes Class Noted - Resolved POA    Hyperkalemia ICD-10-CM: E87.5  ICD-9-CM: 276.7  2022 - Present Unknown        * (Principal) Acute kidney injury [facility]) ICD-10-CM: N17.9  ICD-9-CM: 584.9  2022 - Present Unknown              Objective:     Patient Vitals for the past 24 hrs:   Temp Pulse Resp BP SpO2   22 1130 98.5 °F (36.9 °C) 71 18 133/63 97 %   22 0829 97.6 °F (36.4 °C) (!) 117 18 (!) 175/82 96 %   22 0320 97.8 °F (36.6 °C) 82 18 (!) 126/55 96 %   22 2248 97.6 °F (36.4 °C) 87 18 135/63 95 %   22 1927 98.2 °F (36.8 °C) 91 18 133/65 97 %   22 1725 98 °F (36.7 °C) 94 18 (!) 121/57 96 %     Oxygen Therapy  O2 Sat (%): 97 % (22 1130)  O2 Device: None (Room air) (22 1110)    Estimated body mass index is 24.41 kg/m² as calculated from the following:    Height as of 22: 6' (1.829 m). Weight as of 22: 81.6 kg (180 lb). Intake/Output Summary (Last 24 hours) at 2022 1257  Last data filed at 2022 1245  Gross per 24 hour   Intake 510 ml   Output 650 ml   Net -140 ml         Physical Exam:   Blood pressure 133/63, pulse 71, temperature 98.5 °F (36.9 °C), resp. rate 18, SpO2 97 %. General:    No overt distress  Head:  Normocephalic, atraumatic  Eyes:  Sclerae appear normal.  Pupils equally round. ENT:  Nares appear normal, no drainage. Moist oral mucosa  Neck:  No restricted ROM. Trachea midline   CV:   Tachy, regular. No m/r/g. Lungs:   CTAB. No wheezing, rhonchi, or rales. Respirations even, unlabored on room air. Abdomen:   Soft, nontender, nondistended.   Extremities: No cyanosis or clubbing. Skin:     No rashes and normal coloration. Neuro:  CN II-XII grossly intact. A&O to self, time. Psych:  Mild confusion. I have reviewed ordered lab tests and independently visualized imaging below:    Recent Labs:  Recent Results (from the past 48 hour(s))   EKG, 12 LEAD, INITIAL    Collection Time: 01/25/22  6:20 PM   Result Value Ref Range    Ventricular Rate 79 BPM    Atrial Rate 79 BPM    P-R Interval 166 ms    QRS Duration 84 ms    Q-T Interval 324 ms    QTC Calculation (Bezet) 371 ms    Calculated P Axis 82 degrees    Calculated R Axis 72 degrees    Calculated T Axis 77 degrees    Diagnosis       Normal sinus rhythm  Possible Anterior infarct , age undetermined  Abnormal ECG  No previous ECGs available  Confirmed by Néstor Mcguire MD (), Carmela Wilder ([id number]) on 1/26/2022 8:51:19 AM     METABOLIC PANEL, COMPREHENSIVE    Collection Time: 01/25/22  6:26 PM   Result Value Ref Range    Sodium 139 136 - 145 mmol/L    Potassium 6.7 (HH) 3.5 - 5.1 mmol/L    Chloride 115 (H) 98 - 107 mmol/L    CO2 20 (L) 21 - 32 mmol/L    Anion gap 4 (L) 7 - 16 mmol/L    Glucose 216 (H) 65 - 100 mg/dL    BUN 48 (H) 8 - 23 MG/DL    Creatinine 2.10 (H) 0.8 - 1.5 MG/DL    GFR est AA 39 (L) >60 ml/min/1.73m2    GFR est non-AA 32 (L) >60 ml/min/1.73m2    Calcium 8.8 8.3 - 10.4 MG/DL    Bilirubin, total 0.2 0.2 - 1.1 MG/DL    ALT (SGPT) 23 12 - 65 U/L    AST (SGOT) 27 15 - 37 U/L    Alk.  phosphatase 56 50 - 136 U/L    Protein, total 7.2 6.3 - 8.2 g/dL    Albumin 3.8 3.2 - 4.6 g/dL    Globulin 3.4 2.3 - 3.5 g/dL    A-G Ratio 1.1 (L) 1.2 - 3.5     GLUCOSE, POC    Collection Time: 01/25/22  7:31 PM   Result Value Ref Range    Glucose (POC) 156 (H) 65 - 100 mg/dL    Performed by LanSteven)    GLUCOSE, POC    Collection Time: 01/25/22  8:52 PM   Result Value Ref Range    Glucose (POC) 69 65 - 100 mg/dL    Performed by Nomi)    GLUCOSE, POC    Collection Time: 01/25/22  9:46 PM   Result Value Ref Range    Glucose (POC) 163 (H) 65 - 100 mg/dL    Performed by BernyHarris)    URINALYSIS W/ RFLX MICROSCOPIC    Collection Time: 01/25/22 10:58 PM   Result Value Ref Range    Color YELLOW      Appearance CLEAR      Specific gravity 1.017 1.001 - 1.023      pH (UA) 5.5 5.0 - 9.0      Protein Negative NEG mg/dL    Glucose Negative mg/dL    Ketone Negative NEG mg/dL    Bilirubin Negative NEG      Blood Negative NEG      Urobilinogen 0.2 0.2 - 1.0 EU/dL    Nitrites Negative NEG      Leukocyte Esterase Negative NEG     METABOLIC PANEL, COMPREHENSIVE    Collection Time: 01/26/22  4:56 AM   Result Value Ref Range    Sodium 141 136 - 145 mmol/L    Potassium 6.0 (H) 3.5 - 5.1 mmol/L    Chloride 115 (H) 98 - 107 mmol/L    CO2 20 (L) 21 - 32 mmol/L    Anion gap 6 (L) 7 - 16 mmol/L    Glucose 104 (H) 65 - 100 mg/dL    BUN 39 (H) 8 - 23 MG/DL    Creatinine 1.58 (H) 0.8 - 1.5 MG/DL    GFR est AA 54 (L) >60 ml/min/1.73m2    GFR est non-AA 45 (L) >60 ml/min/1.73m2    Calcium 8.4 8.3 - 10.4 MG/DL    Bilirubin, total 0.3 0.2 - 1.1 MG/DL    ALT (SGPT) 22 12 - 65 U/L    AST (SGOT) 23 15 - 37 U/L    Alk.  phosphatase 43 (L) 50 - 136 U/L    Protein, total 6.4 6.3 - 8.2 g/dL    Albumin 3.2 3.2 - 4.6 g/dL    Globulin 3.2 2.3 - 3.5 g/dL    A-G Ratio 1.0 (L) 1.2 - 3.5     MAGNESIUM    Collection Time: 01/26/22  4:56 AM   Result Value Ref Range    Magnesium 2.1 1.8 - 2.4 mg/dL   CBC WITH AUTOMATED DIFF    Collection Time: 01/26/22  4:56 AM   Result Value Ref Range    WBC 5.2 4.3 - 11.1 K/uL    RBC 3.14 (L) 4.23 - 5.6 M/uL    HGB 9.5 (L) 13.6 - 17.2 g/dL    HCT 30.4 (L) 41.1 - 50.3 %    MCV 96.8 79.6 - 97.8 FL    MCH 30.3 26.1 - 32.9 PG    MCHC 31.3 (L) 31.4 - 35.0 g/dL    RDW 14.9 (H) 11.9 - 14.6 %    PLATELET 103 325 - 682 K/uL    MPV 10.2 9.4 - 12.3 FL    ABSOLUTE NRBC 0.00 0.0 - 0.2 K/uL    DF AUTOMATED      NEUTROPHILS 64 43 - 78 %    LYMPHOCYTES 21 13 - 44 %    MONOCYTES 9 4.0 - 12.0 %    EOSINOPHILS 3 0.5 - 7.8 % BASOPHILS 1 0.0 - 2.0 %    IMMATURE GRANULOCYTES 2 0.0 - 5.0 %    ABS. NEUTROPHILS 3.3 1.7 - 8.2 K/UL    ABS. LYMPHOCYTES 1.1 0.5 - 4.6 K/UL    ABS. MONOCYTES 0.5 0.1 - 1.3 K/UL    ABS. EOSINOPHILS 0.2 0.0 - 0.8 K/UL    ABS. BASOPHILS 0.0 0.0 - 0.2 K/UL    ABS. IMM. GRANS. 0.1 0.0 - 0.5 K/UL   GLUCOSE, POC    Collection Time: 01/26/22  1:08 PM   Result Value Ref Range    Glucose (POC) 119 (H) 65 - 100 mg/dL    Performed by Rickey    GLUCOSE, POC    Collection Time: 01/26/22  9:21 PM   Result Value Ref Range    Glucose (POC) 158 (H) 65 - 100 mg/dL    Performed by Marcin    GLUCOSE, POC    Collection Time: 01/27/22  7:25 AM   Result Value Ref Range    Glucose (POC) 139 (H) 65 - 100 mg/dL    Performed by Aureliano    RENAL FUNCTION PANEL    Collection Time: 01/27/22  9:44 AM   Result Value Ref Range    Sodium 140 138 - 145 mmol/L    Potassium 5.8 (H) 3.5 - 5.1 mmol/L    Chloride 115 (H) 98 - 107 mmol/L    CO2 21 21 - 32 mmol/L    Anion gap 4 (L) 7 - 16 mmol/L    Glucose 148 (H) 65 - 100 mg/dL    BUN 25 (H) 8 - 23 MG/DL    Creatinine 1.24 0.8 - 1.5 MG/DL    GFR est AA >60 >60 ml/min/1.73m2    GFR est non-AA 59 (L) >60 ml/min/1.73m2    Calcium 9.0 8.3 - 10.4 MG/DL    Phosphorus 2.8 2.3 - 3.7 MG/DL    Albumin 3.4 3.2 - 4.6 g/dL   GLUCOSE, POC    Collection Time: 01/27/22 11:07 AM   Result Value Ref Range    Glucose (POC) 156 (H) 65 - 100 mg/dL    Performed by Aureliano        All Micro Results     None          Other Studies:  No results found.     Current Meds:  Current Facility-Administered Medications   Medication Dose Route Frequency    sodium zirconium cyclosilicate (LOKELMA) powder packet 15 g  15 g Oral NOW    acetaminophen (TYLENOL) tablet 650 mg  650 mg Oral Q6H PRN    Or    acetaminophen (TYLENOL) suppository 650 mg  650 mg Rectal Q6H PRN    heparin (porcine) injection 5,000 Units  5,000 Units SubCUTAneous Q12H    ondansetron (ZOFRAN ODT) tablet 4 mg  4 mg Oral Q8H PRN Or    ondansetron (ZOFRAN) injection 4 mg  4 mg IntraVENous Q6H PRN    polyethylene glycol (MIRALAX) packet 17 g  17 g Oral DAILY PRN    sodium chloride (NS) flush 5-40 mL  5-40 mL IntraVENous Q8H    sodium chloride (NS) flush 5-40 mL  5-40 mL IntraVENous PRN    0.9% sodium chloride infusion  100 mL/hr IntraVENous CONTINUOUS    insulin lispro (HUMALOG) injection   SubCUTAneous AC&HS       Signed:  Johnny Peace NP    Part of this note may have been written by using a voice dictation software. The note has been proof read but may still contain some grammatical/other typographical errors.

## 2022-01-27 NOTE — ROUTINE PROCESS
TRANSFER - OUT REPORT:    Verbal report given to LUIS Cole(name) on Isabel Bateman  being transferred to 3rd floor Telemetry(unit) for routine progression of care       Report consisted of patients Situation, Background, Assessment and   Recommendations(SBAR). Information from the following report(s) SBAR, Kardex, Intake/Output, MAR and Recent Results was reviewed with the receiving nurse. Lines:   Peripheral IV 01/26/22 Anterior;Right Forearm (Active)   Site Assessment Clean, dry, & intact 01/27/22 1245   Phlebitis Assessment 0 01/27/22 1245   Infiltration Assessment 0 01/27/22 1245   Dressing Status Clean, dry, & intact 01/27/22 1245   Dressing Type Tape;Transparent 01/27/22 1245   Hub Color/Line Status Patent; Flushed 01/27/22 1245   Alcohol Cap Used No 01/27/22 1245        Opportunity for questions and clarification was provided.       Patient transported with:   Monitor

## 2022-01-27 NOTE — PROGRESS NOTES
ACUTE OT GOALS:  (Developed with and agreed upon by patient and/or caregiver.)  1. Patient will complete lower body bathing and dressing with Mod I and adaptive equipment as needed. 2. Patient will complete toileting with Mod I and adaptive equipment as needed. 3. Patient will tolerate 23 minutes of OT treatment with 1-2 rest breaks to increase activity tolerance for ADLs. 4. Patient will complete functional transfers with Mod I and adaptive equipment as needed. 5. Patient will complete standing grooming ADL with Mod I and adaptive equipment as needed. Timeframe: 7 visits       OCCUPATIONAL THERAPY ASSESSMENT: Initial Assessment, Daily Note and AM OT Treatment Day # 1    Jeny Thompson is a 81 y.o. male   PRIMARY DIAGNOSIS: Acute kidney injury ([facility] [address]  Acute kidney injury ([facility] [address] [N17.9]  Hyperkalemia [E87.5]       Reason for Referral:  Generalized Weakness  ICD-10: Treatment Diagnosis: Generalized Muscle Weakness (M62.81)  Difficulty in walking, Not elsewhere classified (R26.2)  INPATIENT: Payor: [facility] MEDICARE / Plan: [address] [place] [address] HMO / Product Type: Managed Care Medicare /   ASSESSMENT:     REHAB RECOMMENDATIONS:   Recommendation to date pending progress:  Setting:   Short-term Rehab as of now; may progress to [facility] with decreased confusion and improved balance for standing ADLs and functional mobility. Equipment:    To Be Determined     PRIOR LEVEL OF FUNCTION:  (Prior to Hospitalization)  INITIAL/CURRENT LEVEL OF FUNCTION:  (Based on today's evaluation)   Bathing:   Independent  Dressing:   Independent  Feeding/Grooming:   Independent  Toileting:   Independent  Functional Mobility:   Modified Independent with use of cane as needed. Bathing:   Minimal Assistance  Dressing:   Minimal Assistance  Feeding/Grooming:   Minimal Assistance standing balance at sinkside.   Toileting:   Minimal Assistance  Functional Mobility:   Minimal Assistance     ASSESSMENT:  Mr. Rogers was admitted for acute kidney injury and increased fatigue. Presents with deficits in overall cognition, strength, balance, and activity tolerance for performance of ADLs and functional mobility. Pt oriented to name only, appears to believe he is currently at the [facility]. \" Initial evaluation limited due to pt cognitive status. BUE unable to be formally assessed due to pt mentation but presents minimally decreased yet functional for ROM, strength, and coordination per observation. Requires Min A to complete sit <> stand and Min A to complete minimal in unit mobility. Pt observed to present with spatial-like neglect to R side and requires manual cueing to ambulate towards R side to prevent walking into items on the L  side. Pt does present with one significant loss of balance during treatment session; requiring Min A x 2 from therapists to redirect. Requires Min A to return to EOB and CGA to complete sit <> supine. Pt would benefit from continued skilled OT to increase independence and safety for performance of ADLs and functional mobility. SUBJECTIVE:   Mr. Robert Samson states, \"I was just in the store. I can't find my car. \"    SOCIAL HISTORY/LIVING ENVIRONMENT: Pt confused this initial evaluation, thus PLOF may be inaccurate. Per chart review, pt lives alone and works part-time at [employer]. Per pt, he is Independent for ADLs and Mod I for functional mobility with use of cane, \"Sometimes. \"  Home Environment: Private residence  One/Two Story Residence: One story  Living Alone: Yes  Support Systems: Other Family Member(s) (sister)    OBJECTIVE:     PAIN: VITAL SIGNS: LINES/DRAINS:   Pre Treatment: Pain Screen  Pain Scale 1: Numeric (0 - 10)  Pain Intensity 1: 0  Post Treatment: Unchanged   IV  O2 Device: None (Room air)     GROSS EVALUATION:  BUE Within Functional Limits Abnormal/ Functional Abnormal/ Non-Functional (see comments) Not Tested Comments:   AROM [x] [] [] []    PROM [] [] [] [x]    Strength [] [x] [] [] Unable to formally assessed; per observation, minimally decreased yet functional.    Balance [] [x] [] []    Posture [] [x] [] []    Sensation [] [] [] [] Unable to formally assess due to pt mentation. Coordination [x] [] [] []    Tone [] [] [] [x]    Edema [x] [] [] []    Activity Tolerance [] [x] [] []     [] [] [] []      COGNITION/  PERCEPTION: Intact Impaired   (see comments) Comments:   Orientation [] [x] Oriented to name only. Vision [x] [] With glasses donned. Hearing [x] []    Judgment/ Insight [] [x] Pt with increased confusion. Also observed to present with spatial-like neglect to R side and needs cueing to walk to R side to avoid walking into things on the L side of the hallway. Attention [] [x] Decreased sustained attention to task. Memory [] [x] Unable to accurately provide PLOF information due to increased confusion. Command Following [] [x] Requires additional cueing throughout session due to increased confusion. Emotional Regulation [] [x] Pt presents slightly irritable with therapist during treatment session.     [] []      ACTIVITIES OF DAILY LIVING: I Mod I S SBA CGA Min Mod Max Total NT Comments   BASIC ADLs:              Bathing/ Showering [] [] [] [] [] [] [] [] [] [x]    Toileting [] [] [] [] [] [] [] [] [] [x]    Dressing [] [] [] [] [] [] [] [] [] [x]    Feeding [] [] [] [] [] [] [] [] [] [x]    Grooming [] [] [] [] [] [] [] [] [] [x]    Personal Device Care [] [] [] [] [] [] [] [] [] [x]    Functional Mobility [] [] [] [] [] [x] [] [] [] [] Sit <> stand transfers and ambulation.     I=Independent, Mod I=Modified Independent, S=Supervision, SBA=Standby Assistance, CGA=Contact Guard Assistance,   Min=Minimal Assistance, Mod=Moderate Assistance, Max=Maximal Assistance, Total=Total Assistance, NT=Not Tested    MOBILITY: I Mod I S SBA CGA Min Mod Max Total  NT x2 Comments:   Supine to sit [] [] [] [] [x] [] [] [] [] [] []    Sit to supine [] [] [] [] [x] [] [] [] [] [] []    Sit to stand [] [] [] [] [] [x] [] [] [] [] []    Bed to chair [] [] [] [] [] [] [] [] [] [x] [] Due to no chair in ER. Min A to complete in-unit mobility. I=Independent, Mod I=Modified Independent, S=Supervision, SBA=Standby Assistance, CGA=Contact Guard Assistance,   Min=Minimal Assistance, Mod=Moderate Assistance, Max=Maximal Assistance, Total=Total Assistance, NT=Not [address]   Daily Activity Inpatient Short Form        How much help from another person does the patient currently need. .. Total A Lot A Little None   1. Putting on and taking off regular lower body clothing? [] 1   [] 2   [x] 3   [] 4   2. Bathing (including washing, rinsing, drying)? [] 1   [] 2   [x] 3   [] 4   3. Toileting, which includes using toilet, bedpan or urinal?   [] 1   [] 2   [x] 3   [] 4   4. Putting on and taking off regular upper body clothing? [] 1   [] 2   [x] 3   [] 4   5. Taking care of personal grooming such as brushing teeth? [] 1   [] 2   [x] 3   [] 4   6. Eating meals? [] 1   [] 2   [] 3   [x] 4   © 2007, Trustees of [address] [place] [address] [facility], under license to [employer]. All rights reserved     Score:  Initial: 19, completed, 1/27/2022 Most Recent: X (Date: -- )   Interpretation of Tool:  Represents activities that are increasingly more difficult (i.e. Bed mobility, Transfers, Gait). PLAN:   FREQUENCY/DURATION: OT Plan of Care: 3 times/week for duration of hospital stay or until stated goals are met, whichever comes first.    PROBLEM LIST:   (Skilled intervention is medically necessary to address:)  1. Decreased ADL/Functional Activities  2. Decreased Activity Tolerance  3. Decreased Balance  4. Decreased Cognition  5. Decreased Gait Ability  6. Decreased Strength  7. Decreased Transfer Abilities   INTERVENTIONS PLANNED:   (Benefits and precautions of occupational therapy have been discussed with the patient.)  1. Self Care Training  2. Therapeutic Activity  3.  Therapeutic Exercise/HEP  4. Neuromuscular Re-education  5. Education     TREATMENT:     EVALUATION: Low Complexity : (Untimed Charge)    TREATMENT:   Co-Treatment PT/OT necessary due to patient's decreased overall endurance/tolerance levels, as well as need for high level skilled assistance to complete functional transfers/mobility and functional tasks  Neuromuscular Re-education (8 Minutes): Neuromuscular Re-education included Balance Training, Coordination training, Sitting balance training and Standing balance training to improve Balance, Coordination and Postural Control. TREATMENT GRID:  N/A    AFTER TREATMENT POSITION/PRECAUTIONS:  Bed, Needs within reach, RN notified and table tray adjacent to pt.     INTERDISCIPLINARY COLLABORATION:  RN/PCT, PT/PTA and OT/BARDALES    TOTAL TREATMENT DURATION:  OT Patient Time In/Time Out  Time In: 1110  Time Out: 1124    RAQUEL Robertson/JONATHAN

## 2022-01-27 NOTE — PROGRESS NOTES
Pt awoke confused trying  to get out of bed, bed alarm went off. Pt insisting he needs to go see his doctor now. Eplained plan of care and need to stay in bed for the night. will continue to monitor closely.

## 2022-01-27 NOTE — PROGRESS NOTES
ACUTE PHYSICAL THERAPY GOALS:  (Developed with and agreed upon by patient and/or caregiver. )  LTG:  (1.)Mr. Janie Arnett will move from supine to sit and sit to supine , scoot up and down and roll side to side in bed with INDEPENDENT within 7 treatment day(s). (2.)Mr. Janie Arnett will transfer from bed to chair and chair to bed with INDEPENDENT using the least restrictive device within 7 treatment day(s). (3.)Mr. Janie Arnett will ambulate with SUPERVISION for 500+ feet with the least restrictive device within 7 treatment day(s) while maintaining normal vital signs. ________________________________________________________________________________________________       PHYSICAL THERAPY ASSESSMENT: Initial Assessment and AM PT Treatment Day # 1      Ryley Santiago is a 81 y.o. male   PRIMARY DIAGNOSIS: Acute kidney injury ([address]  Acute kidney injury ([address] [N17.9]  Hyperkalemia [E87.5]       Reason for Referral:    ICD-10: Treatment Diagnosis: Difficulty in walking, Not elsewhere classified (R26.2)  INPATIENT: Payor: [facility] MEDICARE / Plan: [address] [place] [address] HMO / Product Type: Managed Care Medicare /     ASSESSMENT:     REHAB RECOMMENDATIONS:   Recommendation to date pending progress:  Setting:   Short-term Rehab   if pt mentation and mobility improves, HHPT  Equipment:    To Be Determined     PRIOR LEVEL OF FUNCTION:  (Prior to Hospitalization) INITIAL/CURRENT LEVEL OF FUNCTION:  (Most Recently Demonstrated)   Bed Mobility:   Independent  Sit to Stand:   Independent  Transfers:   Independent  Gait/Mobility:   Independent Bed Mobility:   Standby Assistance  Sit to Stand:   Contact Guard Assistance  Transfers:   Minimal Assistance  Gait/Mobility:   Minimal Assistance     ASSESSMENT:  Mr. Janie Arnett presents with decreased transfers, ambulation, balance, activity tolerance and overall general functional mobility. Pt with hospital admission on 1/25/22 with general weakness/fatigue for several days.  Pt with AMS this date, states \"looking for his car\" and \"in [place]\". Pt impulsive and agitated during session at times. Pt CGA to MIN A with mobility, unsteady with ambulation. Pt with shuffled steps, B knees flexed, several LOB with activity, HHA and close contact for support. Pt fall risk at this time and would be unsafe to return home alone. PT to cont to follow for acute care needs to address deficits noted above.       SUBJECTIVE:   Mr. Michael Garcia states, \"I need to find my car\"    SOCIAL HISTORY/LIVING ENVIRONMENT: lives alone, works part time at [employer], independent BL ADLs, uses cane sometimes  Home Environment: Private residence  One/Two Story Residence: One story  Living Alone: Yes  Support Systems: Other Family Member(s) (sister)  OBJECTIVE:     PAIN: VITAL SIGNS: LINES/DRAINS:   Pre Treatment: Pain Screen  Pain Scale 1: Numeric (0 - 10)  Pain Intensity 1: 0  Post Treatment: 0 Vital Signs  O2 Device: None (Room air) IV  O2 Device: None (Room air)     GROSS EVALUATION:  B LE Within Functional Limits Abnormal/ Functional Abnormal/ Non-Functional (see comments) Not Tested Comments:   AROM [x] [] [] []    PROM [x] [] [] []    Strength [] [x] [] [] Grossly 4/5   Balance [] [x] [] []    Posture [x] [] [] []    Sensation [] [] [] [x]    Coordination [] [x] [] []    Tone [x] [] [] []    Edema [x] [] [] []    Activity Tolerance [] [x] [] []     [] [] [] []      COGNITION/  PERCEPTION: Intact Impaired   (see comments) Comments:   Orientation [] [x] To self, month, year; confused on place and normal conversation   Vision [] [x] glasses   Hearing [] [x] Hearing aids   Command Following [x] []    Safety Awareness [] [x] impulsive    [] []      MOBILITY: I Mod I S SBA CGA Min Mod Max Total  NT x2 Comments:   Bed Mobility    Rolling [] [] [] [x] [] [] [] [] [] [] []    Supine to Sit [] [] [] [] [x] [] [] [] [] [] []    Scooting [x] [] [] [] [] [] [] [] [] [] []    Sit to Supine [] [] [] [] [x] [] [] [] [] [] []    Transfers Sit to Stand [] [] [] [] [x] [x] [] [] [] [] []    Bed to Chair [] [] [] [] [] [] [] [] [] [x] []    Stand to Sit [] [] [] [] [x] [x] [] [] [] [] []    I=Independent, Mod I=Modified Independent, S=Supervision, SBA=Standby Assistance, CGA=Contact Guard Assistance,   Min=Minimal Assistance, Mod=Moderate Assistance, Max=Maximal Assistance, Total=Total Assistance, NT=Not Tested  GAIT: I Mod I S SBA CGA Min Mod Max Total  NT x2 Comments:   Level of Assistance [] [] [] [] [] [x] [] [] [] [] []    Distance 80    DME HHA and close contact    Gait Quality Unsteady, shuffled, B knees flexed    Weightbearing Status WBAT     I=Independent, Mod I=Modified Independent, S=Supervision, SBA=Standby Assistance, CGA=Contact Guard Assistance,   Min=Minimal Assistance, Mod=Moderate Assistance, Max=Maximal Assistance, Total=Total Assistance, NT=Not [facility]       How much difficulty does the patient currently have. .. Unable A Lot A Little None   1. Turning over in bed (including adjusting bedclothes, sheets and blankets)? [] 1   [] 2   [] 3   [x] 4   2. Sitting down on and standing up from a chair with arms ( e.g., wheelchair, bedside commode, etc.)   [] 1   [] 2   [x] 3   [] 4   3. Moving from lying on back to sitting on the side of the bed? [] 1   [] 2   [x] 3   [] 4   How much help from another person does the patient currently need. .. Total A Lot A Little None   4. Moving to and from a bed to a chair (including a wheelchair)? [] 1   [] 2   [x] 3   [] 4   5. Need to walk in hospital room? [] 1   [] 2   [x] 3   [] 4   6. Climbing 3-5 steps with a railing? [] 1   [] 2   [x] 3   [] 4   © 2007, Trustees of [address] [place] [address] [address] [address], under license to [employer].  All rights reserved     Score:  Initial: 19 Most Recent: X (Date: -- )    Interpretation of Tool:  Represents activities that are increasingly more difficult (i.e. Bed mobility, Transfers, Gait).    PLAN:   FREQUENCY/DURATION: PT Plan of Care: 3 times/week for duration of hospital stay or until stated goals are met, whichever comes first.    PROBLEM LIST:   (Skilled intervention is medically necessary to address:)  1. Decreased ADL/Functional Activities  2. Decreased Activity Tolerance  3. Decreased Balance  4. Decreased Cognition  5. Decreased Gait Ability  6. Decreased Strength  7. Decreased Transfer Abilities   INTERVENTIONS PLANNED:   (Benefits and precautions of physical therapy have been discussed with the patient.)  1. Therapeutic Activity  2. Therapeutic Exercise/HEP  3. Neuromuscular Re-education  4. Gait Training  5. Manual Therapy  6. Education     TREATMENT:     EVALUATION: Moderate Complexity : (Untimed Charge)    TREATMENT:   ($$ Gait Trainin-22 mins    )  Gait Training (8 Minutes): Gait training for 80 feet utilizing HHA and close contact. Patient required Tactile and Verbal cueing to improve Activity Pacing, Dynamic Standing Balance and Gait Mechanics.      TREATMENT GRID:  N/A    AFTER TREATMENT POSITION/PRECAUTIONS:  Bed, Needs within reach and RN notified    INTERDISCIPLINARY COLLABORATION:  RN/PCT, PT/PTA and OT/BARDALES    TOTAL TREATMENT DURATION:  PT Patient Time In/Time Out  Time In: 1110  Time Out: [address] [facility], PT

## 2022-01-27 NOTE — ROUTINE PROCESS
TRANSFER - IN REPORT:    Verbal report received from Alin Naranjo RN on Mel Galaviz being received from GI lab overflow for routine progression of care      Report consisted of patients Situation, Background, Assessment and Recommendations(SBAR). Information from the following report(s) SBAR and [employer] was reviewed with the receiving nurse. Opportunity for questions and clarification was provided. Assessment completed upon patients arrival to unit and care assumed.

## 2022-01-28 VITALS
WEIGHT: 150 LBS | TEMPERATURE: 97 F | RESPIRATION RATE: 18 BRPM | BODY MASS INDEX: 20.34 KG/M2 | SYSTOLIC BLOOD PRESSURE: 121 MMHG | HEART RATE: 97 BPM | OXYGEN SATURATION: 97 % | DIASTOLIC BLOOD PRESSURE: 64 MMHG

## 2022-01-28 LAB
ANION GAP SERPL CALC-SCNC: 1 MMOL/L (ref 7–16)
BUN SERPL-MCNC: 25 MG/DL (ref 8–23)
CALCIUM SERPL-MCNC: 9.2 MG/DL (ref 8.3–10.4)
CHLORIDE SERPL-SCNC: 112 MMOL/L (ref 98–107)
CO2 SERPL-SCNC: 26 MMOL/L (ref 21–32)
CREAT SERPL-MCNC: 1.29 MG/DL (ref 0.8–1.5)
GLUCOSE BLD STRIP.AUTO-MCNC: 113 MG/DL (ref 65–100)
GLUCOSE BLD STRIP.AUTO-MCNC: 120 MG/DL (ref 65–100)
GLUCOSE BLD STRIP.AUTO-MCNC: 130 MG/DL (ref 65–100)
GLUCOSE BLD STRIP.AUTO-MCNC: 176 MG/DL (ref 65–100)
GLUCOSE SERPL-MCNC: 127 MG/DL (ref 65–100)
POTASSIUM SERPL-SCNC: 5.1 MMOL/L (ref 3.5–5.1)
SERVICE CMNT-IMP: ABNORMAL
SODIUM SERPL-SCNC: 139 MMOL/L (ref 138–145)

## 2022-01-28 PROCEDURE — 82962 GLUCOSE BLOOD TEST: CPT

## 2022-01-28 PROCEDURE — 97116 GAIT TRAINING THERAPY: CPT

## 2022-01-28 PROCEDURE — 74011000250 HC RX REV CODE- 250: Performed by: INTERNAL MEDICINE

## 2022-01-28 PROCEDURE — 97112 NEUROMUSCULAR REEDUCATION: CPT

## 2022-01-28 PROCEDURE — 74011250636 HC RX REV CODE- 250/636: Performed by: INTERNAL MEDICINE

## 2022-01-28 PROCEDURE — 80048 BASIC METABOLIC PNL TOTAL CA: CPT

## 2022-01-28 PROCEDURE — 97535 SELF CARE MNGMENT TRAINING: CPT

## 2022-01-28 PROCEDURE — 36415 COLL VENOUS BLD VENIPUNCTURE: CPT

## 2022-01-28 RX ADMIN — SODIUM CHLORIDE, PRESERVATIVE FREE 5 ML: 5 INJECTION INTRAVENOUS at 06:47

## 2022-01-28 RX ADMIN — SODIUM CHLORIDE, PRESERVATIVE FREE 10 ML: 5 INJECTION INTRAVENOUS at 14:00

## 2022-01-28 RX ADMIN — HEPARIN SODIUM 5000 UNITS: 5000 INJECTION INTRAVENOUS; SUBCUTANEOUS at 09:24

## 2022-01-28 NOTE — PROGRESS NOTES
ACUTE PHYSICAL THERAPY GOALS:  (Developed with and agreed upon by patient and/or caregiver. )  LTG:  (1.)Mr. Rogers will move from supine to sit and sit to supine , scoot up and down and roll side to side in bed with INDEPENDENT within 7 treatment day(s). (2.)Mr. Rogers will transfer from bed to chair and chair to bed with INDEPENDENT using the least restrictive device within 7 treatment day(s). (3.)Mr. Rogers will ambulate with SUPERVISION for 500+ feet with the least restrictive device within 7 treatment day(s) while maintaining normal vital signs. PHYSICAL THERAPY: Daily Note Treatment Day # 2    Jeny Thompson is a 81 y.o. male   PRIMARY DIAGNOSIS: Acute kidney injury ([address]  Acute kidney injury ([address] [N17.9]  Hyperkalemia [E87.5]         ASSESSMENT:     REHAB RECOMMENDATIONS: CURRENT LEVEL OF FUNCTION:  (Most Recently Demonstrated)   Recommendation to date pending progress:  Setting:   Short-term Rehab v. HHPT pending mentation   Equipment:    None Bed Mobility:   Contact Guard Assistance  Sit to Stand:   Minimal Assistance  Transfers:   Contact Guard Assistance  Gait/Mobility:   Contact Guard Assistance x 2     ASSESSMENT:  Mr. Rogers is making good progress toward goals. He demonstrated improved mobility and increased gait distances this session. Static/dynami standing balance at the sink during ADLS with CGA and minimal cueing for safety awareness. During ambulation minimal cueing for posture, and gait technique to improve balance. Multiple reps of practice for safe stand to sit into the recliner. He may be able to go home, but would need improved mentation and/or 24/7 care.      SUBJECTIVE:   Mr. Rogers states, \"2 miles is a little to far to walk to work\"    SOCIAL HISTORY/ LIVING ENVIRONMENT: lives alone, works part time at [employer], independent BL ADLs, uses cane sometimes  Home Environment: Private residence  [address] [place] [address]: One story  Living Alone: Yes  Support Systems: Other Family Member(s) (sister)  OBJECTIVE:     PAIN: VITAL SIGNS: LINES/DRAINS:   Pre Treatment: Pain Screen  Pain Scale 1: Numeric (0 - 10)  Pain Intensity 1: 0  Post Treatment: 0      O2 Device: None (Room air)     MOBILITY: I Mod I S SBA CGA Min Mod Max Total  NT x2 Comments:   Bed Mobility    Rolling [] [] [] [] [] [] [] [] [] [x] []    Supine to Sit [] [] [] [] [x] [x] [] [] [] [] []    Scooting [] [] [] [] [x] [] [] [] [] [] []    Sit to Supine [] [] [] [] [] [] [] [] [] [x] []    Transfers    Sit to Stand [] [] [] [] [x] [x] [] [] [] [] []    Bed to Chair [] [] [] [] [] [] [] [] [] [] []    Stand to Sit [] [] [] [] [x] [] [] [] [] [] []    I=Independent, Mod I=Modified Independent, S=Supervision, SBA=Standby Assistance, CGA=Contact Guard Assistance,   Min=Minimal Assistance, Mod=Moderate Assistance, Max=Maximal Assistance, Total=Total Assistance, NT=Not Tested    BALANCE: Good Fair+ Fair Fair- Poor NT Comments   Sitting Static [x] [x] [] [] [] []    Sitting Dynamic [] [x] [] [] [] []              Standing Static [] [x] [] [] [] []    Standing Dynamic [] [x] [x] [] [] []      GAIT: I Mod I S SBA CGA Min Mod Max Total  NT x2 Comments:   Level of Assistance [] [] [] [] [x] [x] [] [] [] [] []    Distance 80'    DME HHA/none    Gait Quality Slightly unsteady with LOB    Weightbearing  Status N/A     I=Independent, Mod I=Modified Independent, S=Supervision, SBA=Standby Assistance, CGA=Contact Guard Assistance,   Min=Minimal Assistance, Mod=Moderate Assistance, Max=Maximal Assistance, Total=Total Assistance, NT=Not Tested    PLAN:   FREQUENCY/DURATION: PT Plan of Care: 3 times/week for duration of hospital stay or until stated goals are met, whichever comes first.  TREATMENT:     TREATMENT:   ($$ Gait Trainin-37 mins    )  Co-Treatment PT/OT necessary due to patient's decreased overall endurance/tolerance levels, as well as need for high level skilled assistance to complete functional transfers/mobility and functional tasks  Gait Training (30 Minutes): Gait training for 80 feet utilizing [address]. Patient required Tactile and Verbal cueing to improve Activity Pacing, Dynamic Standing Balance and Gait Mechanics.      TREATMENT GRID:  N/A    AFTER TREATMENT POSITION/PRECAUTIONS:  Alarm Activated, Chair, Needs within reach and RN notified    INTERDISCIPLINARY COLLABORATION:  RN/PCT, PT/PTA and OT/BARDALES    TOTAL TREATMENT DURATION:  PT Patient Time In/Time Out  Time In: 1040  Time Out: Joaquin Osman PTA

## 2022-01-28 NOTE — PROGRESS NOTES
Physician Progress Note      PATIENT:               Mar Reid  CSN #:                  [id number]  :                       1935  ADMIT DATE:       2022 5:22 PM  DISCH DATE:  RESPONDING  PROVIDER #:        Alhaji Sotne MD          QUERY TEXT:    Pt admitted with JOSE and hyperkalemia. Pt noted to have confusion/delirium. If possible, please document in the progress notes and discharge summary if you are evaluating and / or treating any of the following: The medical record reflects the following:  Risk Factors: JOSE, hyperkalemia, recent outpatient tx for  uti/hematuria with bactrim  Clinical Indicators: alert and oriented.  notes stating patient is confused. k remains elevated at 5.8 on , today it is 5.1 cr improved from 2.10 to 1.21. Only hx found was DM and HTN. no hx of cognitive issues or dementia. . hgb 9.5,  Treatment: iv fluids, essential home meds, ua, labs,  Options provided:  -- Metabolic encephalopathy  -- Toxic encephalopathy  -- Delirium due to, Please specify cause. -- Delirium  -- Other - I will add my own diagnosis  -- Disagree - Not applicable / Not valid  -- Disagree - Clinically unable to determine / Unknown  -- Refer to Clinical Documentation Reviewer    PROVIDER RESPONSE TEXT:    This patient has metabolic encephalopathy.     Query created by: Lynn Escobar on 2022 1:36 PM      Electronically signed by:  Alhaji Stone MD 2022 2:04 PM

## 2022-01-28 NOTE — PROGRESS NOTES
[facility] then contacted pt's sister, Rachna Wood ([phone]) to discuss options. Rachna Wood states pt has contacted her twice today and was appropriate both times. She states pt has a little dog that he will prefer to be with at home. Rachna Wood informs this CM that she visits pt twice daily. She prefers home health for pt at discharge. Pt has a history of [facility] after orthopedic needs. CM sent referral for RN, PT, OT to [facility]. Pt will be transported home by his sister Rachna Wood.

## 2022-01-28 NOTE — DISCHARGE SUMMARY
Hospitalist Discharge Summary   Admit Date:  2022  5:22 PM   DC Note date: 2022  Name:  Graeme Bender   Age:  81 y.o. Sex:  male  :  1935   MRN:  [id number]   Room:  [facility]  PCP:  None    Presenting Complaint: No chief complaint on file. 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 He was ordered by primary care physician, lab work shows acute kidney injury with creatinine of 2.1 on admission, hyperkalemia with potassium of 6.7. Patient was treated for Bactrim recently for urinary tract infection. Patient was admitted for acute kidney injury, hyperkalemia. Hyperkalemia resolved with appropriate treatments including Lokelma. Patient was hydrated with IV fluids and acute kidney injury resolved. Creatinine was 1.29 on discharge. Lisinopril was held due to hyperkalemia. Patient is discharged home today in stable condition to follow-up primary care physician in 3 to 5 days. Repeat BMP at PCP office visit. Disposition: Home Health Care Svc  Diet: ADULT DIET Regular  Code Status: Full Code    Follow Up Orders:   Follow-up Appointments   Procedures    FOLLOW UP VISIT Appointment in: 3 - 5 Days F/U WITH PCP IN 3-5 DAYS     F/U WITH PCP IN 3-5 DAYS     Standing Status:   Standing     Number of Occurrences:   1     Order Specific Question:   Appointment in     Answer:   3 - 5 Days       Follow-up Information     Follow up With Specialties Details Why Contact Info    None    None (395) Patient stated that they have no PCP            Follow up labs/diagnostics (ultimately defer to outpatient provider):  CBC, BMP in 3 to 5 days. Time spent in patient discharge and coordination 39 minutes. Plan was discussed with the pt. All questions answered. Patient was stable at time of discharge. Instructions given to call a physician or return if any concerns. Discharge Info:   Current Discharge Medication List      CONTINUE these medications which have NOT CHANGED    Details   metFORMIN (GLUCOPHAGE) 500 mg tablet Take  by mouth two (2) times daily (with meals). STOP taking these medications       OTHER Comments:   Reason for Stopping:               Procedures done this admission:  * No surgery found *    Consults this admission:  None    Echocardiogram/EKG results:  No results found for this or any previous visit. EKG Results     None          Diagnostic Imaging/Tests:   No results found.     All Micro Results     None          Labs: Results:       BMP, Mg, Phos Recent Labs     01/28/22  1133 01/27/22  0944 01/26/22  0456    140 141   K 5.1 5.8* 6.0*   * 115* 115*   CO2 26 21 20*   AGAP 1* 4* 6*   BUN 25* 25* 39*   CREA 1.29 1.24 1.58*   CA 9.2 9.0 8.4   * 148* 104*   MG  --   --  2.1   PHOS  --  2.8  --       CBC Recent Labs     01/26/22 0456   WBC 5.2   RBC 3.14*   HGB 9.5*   HCT 30.4*      GRANS 64   LYMPH 21   EOS 3   MONOS 9   BASOS 1   IG 2   ANEU 3.3   ABL 1.1   TRINITY 0.2   ABM 0.5   ABB 0.0   AIG 0.1      LFT Recent Labs     01/27/22  0944 01/26/22  0456 01/25/22  1826   ALT  --  22 23   AP --  43* 56   TP  --  6.4 7.2   ALB 3.4 3.2 3.8   GLOB  --  3.2 3.4   AGRAT  --  1.0* 1.1*      Cardiac Testing No results found for: BNPP, BNP, CPK, RCK1, RCK2, RCK3, RCK4, CKMB, CKNDX, CKND1, TROPT, TROIQ   Coagulation Tests No results found for: PTP, INR, APTT, INREXT   A1c No results found for: HBA1C, RAM1PVFQ   Lipid Panel No results found for: CHOL, CHOLPOCT, CHOLX, CHLST, CHOLV, 957624, HDL, HDLP, LDL, LDLC, DLDLP, 037863, VLDLC, VLDL, TGLX, TRIGL, TRIGP, TGLPOCT, CHHD, CHHDX   Thyroid Panel No results found for: TSH, T4, FT4, TT3, T3U, TSHEXT     Most Recent UA Lab Results   Component Value Date/Time    Color YELLOW 01/25/2022 10:58 PM    Appearance CLEAR 01/25/2022 10:58 PM    Specific gravity 1.017 01/25/2022 10:58 PM    pH (UA) 5.5 01/25/2022 10:58 PM    Protein Negative 01/25/2022 10:58 PM    Glucose Negative 01/25/2022 10:58 PM    Ketone Negative 01/25/2022 10:58 PM    Bilirubin Negative 01/25/2022 10:58 PM    Blood Negative 01/25/2022 10:58 PM    Urobilinogen 0.2 01/25/2022 10:58 PM    Nitrites Negative 01/25/2022 10:58 PM    Leukocyte Esterase Negative 01/25/2022 10:58 PM          All Labs from Last 24 Hrs:  Recent Results (from the past 24 hour(s))   GLUCOSE, POC    Collection Time: 01/27/22  3:46 PM   Result Value Ref Range    Glucose (POC) 106 (H) 65 - 100 mg/dL    Performed by Markel    GLUCOSE, POC    Collection Time: 01/27/22  7:45 PM   Result Value Ref Range    Glucose (POC) 190 (H) 65 - 100 mg/dL    Performed by Oswald Verma    GLUCOSE, POC    Collection Time: 01/28/22  8:10 AM   Result Value Ref Range    Glucose (POC) 120 (H) 65 - 100 mg/dL    Performed by Aureliano    GLUCOSE, POC    Collection Time: 01/28/22 11:12 AM   Result Value Ref Range    Glucose (POC) 130 (H) 65 - 100 mg/dL    [facility]    METABOLIC PANEL, BASIC    Collection Time: 01/28/22 11:33 AM   Result Value Ref Range    Sodium 139 138 - 145 mmol/L    Potassium 5.1 3.5 - 5.1 mmol/L    Chloride 112 (H) 98 - 107 mmol/L    CO2 26 21 - 32 mmol/L    Anion gap 1 (L) 7 - 16 mmol/L    Glucose 127 (H) 65 - 100 mg/dL    BUN 25 (H) 8 - 23 MG/DL    Creatinine 1.29 0.8 - 1.5 MG/DL    GFR est AA >60 >60 ml/min/1.73m2    GFR est non-AA 56 (L) >60 ml/min/1.73m2    Calcium 9.2 8.3 - 10.4 MG/DL   GLUCOSE, POC    Collection Time: 01/28/22 11:56 AM   Result Value Ref Range    Glucose (POC) 113 (H) 65 - 100 mg/dL    [facility]        Current Med List in Hospital:   Current Facility-Administered Medications   Medication Dose Route Frequency    labetaloL (NORMODYNE;TRANDATE) injection 20 mg  20 mg IntraVENous Q6H PRN    acetaminophen (TYLENOL) tablet 650 mg  650 mg Oral Q6H PRN    Or    acetaminophen (TYLENOL) suppository 650 mg  650 mg Rectal Q6H PRN    heparin (porcine) injection 5,000 Units  5,000 Units SubCUTAneous Q12H    ondansetron (ZOFRAN ODT) tablet 4 mg  4 mg Oral Q8H PRN    Or    ondansetron (ZOFRAN) injection 4 mg  4 mg IntraVENous Q6H PRN    polyethylene glycol (MIRALAX) packet 17 g  17 g Oral DAILY PRN    sodium chloride (NS) flush 5-40 mL  5-40 mL IntraVENous Q8H    sodium chloride (NS) flush 5-40 mL  5-40 mL IntraVENous PRN    0.9% sodium chloride infusion  75 mL/hr IntraVENous CONTINUOUS    insulin lispro (HUMALOG) injection   SubCUTAneous AC&HS       No Known Allergies  Immunization History   Administered Date(s) Administered    TB Skin Test (PPD) Intradermal 01/26/2022       Recent Vital Data:  Patient Vitals for the past 24 hrs:   Temp Pulse Resp BP SpO2   01/28/22 1121 97.4 °F (36.3 °C) 94 18 134/62 98 %   01/28/22 0826 96.8 °F (36 °C) 77 16 125/65 96 %   01/28/22 0417 97.3 °F (36.3 °C) 72 16 (!) 112/53 97 %   01/28/22 0400 -- 79 -- -- --   01/28/22 0026 97.4 °F (36.3 °C) 78 16 117/68 97 %   01/28/22 0000 -- 80 -- -- --   01/27/22 2000 -- 79 -- -- --   01/27/22 1943 97.8 °F (36.6 °C) 83 18 (!) 122/58 96 %   01/27/22 1546 97.3 °F (36.3 °C) 78 17 (!) 148/64 97 %     Oxygen Therapy  O2 Sat (%): 98 % (01/28/22 1121)  O2 Device: None (Room air) (01/28/22 1121)    Estimated body mass index is 20.34 kg/m² as calculated from the following:    Height as of 1/25/22: 6' (1.829 m). Weight as of this encounter: 68 kg (150 lb). Intake/Output Summary (Last 24 hours) at 1/28/2022 1435  Last data filed at 1/28/2022 0218  Gross per 24 hour   Intake --   Output 1200 ml   Net -1200 ml         Physical Exam:    General:    Elderly male, no overt distress  Head:  Normocephalic, atraumatic  Eyes:  Sclerae appear normal.  Pupils equally round. HENT:  Nares appear normal, no drainage. Moist mucous membranes  Neck:  No restricted ROM. Trachea midline  CV:   RRR. No m/r/g. No JVD  Lungs:   CTAB. No wheezing, rhonchi, or rales. Even, unlabored  Abdomen:   Soft, nontender, nondistended. Extremities: Warm and dry. No cyanosis or clubbing. No edema. Skin:     No rashes. Normal coloration  Neuro:  CN II-XII grossly intact. Psych:  Normal mood and affect. Signed:  Tanesha Bolanos MD    Part of this note may have been written by using a voice dictation software. The note has been proof read but may still contain some grammatical/other typographical errors.

## 2022-01-28 NOTE — PROGRESS NOTES
ACUTE OT GOALS:  (Developed with and agreed upon by patient and/or caregiver.)  1. Patient will complete lower body bathing and dressing with Mod I and adaptive equipment as needed. 2. Patient will complete toileting with Mod I and adaptive equipment as needed. 3. Patient will tolerate 23 minutes of OT treatment with 1-2 rest breaks to increase activity tolerance for ADLs. MET  4. Patient will complete functional transfers with Mod I and adaptive equipment as needed. 5. Patient will complete standing grooming ADL with Mod I and adaptive equipment as needed.     Timeframe: 7 visits     OCCUPATIONAL THERAPY: Daily Note OT Treatment Day # 2    Gris Corbett is a 81 y.o. male   PRIMARY DIAGNOSIS: Acute kidney injury ([facility] [address]  Acute kidney injury ([address] [N17.9]  Hyperkalemia [E87.5]       Payor: [employer] MEDICARE / Plan: [address] [place] [address] HMO / Product Type: Managed Care Medicare /   ASSESSMENT:     REHAB RECOMMENDATIONS: CURRENT LEVEL OF FUNCTION:  (Most Recently Demonstrated)   Recommendation to date pending progress:  Setting:   Short-term Rehab  Equipment:    To Be Determined Bathing:   Not tested  Dressing:   Not tested  Feeding/Grooming:   Contact Guard Assistance  Toileting:   Minimal Assistance  Functional Mobility:   Contact Guard Assistance x 2     ASSESSMENT:  Mr. Aubrey Belcher was supine in the bed upon arrival. Pt is confused but per nursing less confused than yesterday. Pt was extremely pleasant and great to work with this am. Pt still has some shuffling steps and occasional loss of balance needing CGA x 2 for safety. Pt up to the bathroom with min A for balance while standing at the toilet to void. Pt completed functional mobility in the room and out into the hallway. Pt assisted up to the bathroom to work on grooming tasks as he stood to brush dentures. Pt has limited sensation in his fingertips requiring him additional time with manipulating objects in his hands.  Pt left up in the chair with all needs at his side and alarm in place. Good progress towards goals. Continue per plan of care. SUBJECTIVE:   Mr. Damion Snyder states, Leta Jos you for what you do! \"    SOCIAL HISTORY/LIVING ENVIRONMENT:   Home Environment: Private residence  One/Two Story Residence: One story  Living Alone: Yes  Support Systems: Other Family Member(s)    OBJECTIVE:     PAIN: VITAL SIGNS: LINES/DRAINS:   Pre Treatment: Pain Screen  Pain Scale 1: Numeric (0 - 10)  Pain Intensity 1: 0  Post Treatment: 0   none  O2 Device: None (Room air)     ACTIVITIES OF DAILY LIVING: I Mod I S SBA CGA Min Mod Max Total NT Comments   BASIC ADLs:              Bathing/ Showering [] [] [] [] [] [] [] [] [] [x]    Toileting [] [] [] [] [] [x] [] [] [] [] Standing at the toilet   Dressing [] [] [] [] [] [] [] [] [] [x]    Feeding [] [] [] [] [] [] [] [] [] [x]    Grooming [] [] [] [] [x] [] [] [] [] [] Standing sink side for denture care   Personal Device Care [] [] [] [] [] [] [] [] [] [x]    Functional Mobility [] [] [] [] [x] [] [] [] [] [] X 2   I=Independent, Mod I=Modified Independent, S=Supervision, SBA=Standby Assistance, CGA=Contact Guard Assistance,   Min=Minimal Assistance, Mod=Moderate Assistance, Max=Maximal Assistance, Total=Total Assistance, NT=Not Tested    MOBILITY: I Mod I S SBA CGA Min Mod Max Total  NT x2 Comments:   Supine to sit [] [] [] [] [] [x] [] [] [] [] []    Sit to supine [] [] [] [] [] [] [] [] [] [x] []    Sit to stand [] [] [] [] [x] [] [] [] [] [] []    Bed to chair [] [] [] [] [x] [] [] [] [] [] [x]    I=Independent, Mod I=Modified Independent, S=Supervision, SBA=Standby Assistance, CGA=Contact Guard Assistance,   Min=Minimal Assistance, Mod=Moderate Assistance, Max=Maximal Assistance, Total=Total Assistance, NT=Not Tested    BALANCE: Good Fair+ Fair Fair- Poor NT Comments   Sitting Static [] [x] [] [] [] []    Sitting Dynamic [] [] [x] [] [] []              Standing Static [] [] [x] [] [] []    Standing Dynamic [] [] [] [x] [] []      PLAN:   FREQUENCY/DURATION: OT Plan of Care: 3 times/week for duration of hospital stay or until stated goals are met, whichever comes first.    TREATMENT:   TREATMENT:   ($$ Self Care/Home Management: 8-22 mins$$ Neuromuscular Re-Education: 8-22 mins   )  Co-Treatment PT/OT necessary due to patient's decreased overall endurance/tolerance levels, as well as need for high level skilled assistance to complete functional transfers/mobility and functional tasks  Self Care (20 Minutes): Self care including Toileting and Grooming to increase independence and decrease level of assistance required. Neuromuscular Re-education (10 Minutes): Neuromuscular Re-education included Balance Training, Coordination training, Postural training, Sitting balance training and Standing balance training to improve Balance, Coordination and Postural Control.     TREATMENT GRID:  N/A    AFTER TREATMENT POSITION/PRECAUTIONS:  Alarm Activated, Chair, Needs within reach and RN notified    INTERDISCIPLINARY COLLABORATION:  RN/PCT, PT/PTA and OT/BARDALES    TOTAL TREATMENT DURATION:  OT Patient Time In/Time Out  Time In: 1040  Time Out: 400 MARCI Regalado

## 2022-01-28 NOTE — ROUTINE PROCESS
Discharge instructions were reviewed with patient. An opportunity was given for questions. All medications were reviewed, and information was given on the new medications. Patient verbalized understanding, and has no questions at this time. Iv and monitor removed.  Waiting on Sweta to take home

## 2022-03-18 PROBLEM — D64.9 NORMOCYTIC ANEMIA: Status: ACTIVE | Noted: 2022-01-26

## 2022-03-19 PROBLEM — N17.9 ACUTE KIDNEY INJURY (HCC): Status: ACTIVE | Noted: 2022-01-25

## 2022-03-19 PROBLEM — E11.9 DIABETES MELLITUS TYPE 2, CONTROLLED (HCC): Status: ACTIVE | Noted: 2022-01-25

## 2022-03-19 PROBLEM — I10 HYPERTENSION: Status: ACTIVE | Noted: 2022-01-25

## 2022-03-20 PROBLEM — E87.5 HYPERKALEMIA: Status: ACTIVE | Noted: 2022-01-25

## 2022-10-11 ENCOUNTER — HOSPITAL ENCOUNTER (OUTPATIENT)
Dept: WOUND CARE | Age: 87
Discharge: HOME | End: 2022-10-11
Payer: MEDICARE

## 2022-10-11 VITALS
WEIGHT: 144 LBS | HEIGHT: 72 IN | TEMPERATURE: 97.7 F | DIASTOLIC BLOOD PRESSURE: 65 MMHG | BODY MASS INDEX: 19.5 KG/M2 | SYSTOLIC BLOOD PRESSURE: 132 MMHG | HEART RATE: 86 BPM

## 2022-10-11 DIAGNOSIS — S51.019A SKIN TEAR OF ELBOW WITHOUT COMPLICATION, INITIAL ENCOUNTER: ICD-10-CM

## 2022-10-11 PROCEDURE — 99202 OFFICE O/P NEW SF 15 MIN: CPT | Performed by: FAMILY MEDICINE

## 2022-10-11 PROCEDURE — 99203 OFFICE O/P NEW LOW 30 MIN: CPT

## 2022-10-11 PROCEDURE — 11042 DBRDMT SUBQ TIS 1ST 20SQCM/<: CPT | Performed by: FAMILY MEDICINE

## 2022-10-11 PROCEDURE — 11042 DBRDMT SUBQ TIS 1ST 20SQCM/<: CPT

## 2022-10-11 RX ORDER — ALLOPURINOL 100 MG/1
50 TABLET ORAL DAILY
COMMUNITY
Start: 2022-05-26 | End: 2023-05-26

## 2022-10-11 RX ORDER — LIDOCAINE HYDROCHLORIDE 20 MG/ML
JELLY TOPICAL PRN
Status: DISCONTINUED | OUTPATIENT
Start: 2022-10-11 | End: 2022-10-12 | Stop reason: HOSPADM

## 2022-10-11 RX ORDER — ATORVASTATIN CALCIUM 20 MG/1
20 TABLET, FILM COATED ORAL
COMMUNITY
Start: 2022-07-28 | End: 2023-01-24

## 2022-10-11 RX ORDER — ASPIRIN 81 MG/1
81 TABLET ORAL DAILY
COMMUNITY

## 2022-10-11 RX ORDER — FUROSEMIDE 40 MG/1
40 TABLET ORAL DAILY
Status: ON HOLD | COMMUNITY
Start: 2022-04-29 | End: 2022-10-25 | Stop reason: SDUPTHER

## 2022-10-11 RX ORDER — AMLODIPINE BESYLATE 5 MG/1
5 TABLET ORAL DAILY
COMMUNITY
Start: 2022-05-12

## 2022-10-11 RX ADMIN — LIDOCAINE HYDROCHLORIDE: 20 JELLY TOPICAL at 08:54

## 2022-10-11 NOTE — DISCHARGE INSTRUCTIONS
Discharge Instructions for  Aleena Webb  [address] [place] [address], [address]  Phone [phone]   Fax [phone]        NAME:  Stone Rahman OF BIRTH:  1935  MEDICAL RECORD NUMBER:  [id number]  DATE:  10/11/2022     Return Appointment:   1 week with Rayna Weller DO     Instructions: right elbow:  Cleanse with normal saline or wound cleanser. Xeroform- apply to wound bed. Cover with rolled gauze or retention netting. Change three times per week at the wound center (M,W,F). Do not get wound wet in shower, pool or tub. May purchase cast cover at local pharmacy to keep dry in shower. Increase dietary protein to 60 grams or more per day to help with tissue regrowth/ healing. May use liquid supplements such as Glucerna, Ensure Max, & Bhaskar (samples & coupons sent with patient). Debridement done at today's visit. Should you experience increased redness, swelling, pain, foul odor, size of wound(s), or have a temperature over 101 degrees please contact the [address] [place] [address] Road at [phone] or if after hours contact your primary care physician or go to the hospital emergency department. PLEASE NOTE: IF YOU ARE UNABLE TO OBTAIN WOUND SUPPLIES, CONTINUE TO USE THE SUPPLIES YOU HAVE AVAILABLE UNTIL YOU ARE ABLE TO REACH US. IT IS MOST IMPORTANT TO KEEP THE WOUND COVERED AT ALL TIMES.      Electronically signed Becky Hernandes RN on 10/11/2022 at 8:56 AM

## 2022-10-11 NOTE — FLOWSHEET NOTE
Post debridement     10/11/22 0826   Wound 10/11/22 Arm Posterior;Right elbow   Date First Assessed/Time First Assessed: 10/11/22 0834   Present on Hospital Admission: Yes  Wound Approximate Age at First Assessment (Weeks): 1 weeks  Primary Wound Type: Traumatic  Location: Arm  Wound Location Orientation: Posterior;Right  Wound D... Wound Image     Wound Etiology Traumatic   Dressing Status Clean;Dry; Intact; Old drainage noted   Wound Cleansed Cleansed with saline   Dressing/Treatment Gauze dressing/dressing sponge;Roll gauze   Wound Length (cm) 11 cm   Wound Width (cm) 5 cm   Wound Depth (cm) 0.1 cm   Wound Surface Area (cm^2) 55 cm^2   Wound Volume (cm^3) 5.5 cm^3   Post-Procedure Length (cm) 11 cm   Post-Procedure Width (cm) 5 cm   Post-Procedure Depth (cm) 0.1 cm   Post-Procedure Surface Area (cm^2) 55 cm^2   Post-Procedure Volume (cm^3) 5.5 cm^3   Wound Assessment Blood filled blister;Pink/red   Drainage Amount Moderate   Drainage Description Serosanguinous   Odor None   Tammie-wound Assessment Fragile   Margins Attached edges   Wound Thickness Description not for Pressure Injury Full thickness

## 2022-10-11 NOTE — FLOWSHEET NOTE
10/11/22 0826   Wound 10/11/22 Arm Posterior;Right elbow   Date First Assessed/Time First Assessed: 10/11/22 0834   Present on Hospital Admission: Yes  Wound Approximate Age at First Assessment (Weeks): 1 weeks  Primary Wound Type: Traumatic  Location: Arm  Wound Location Orientation: Posterior;Right  Wound D... Wound Etiology Traumatic   Dressing Status Clean;Dry; Intact; Old drainage noted   Wound Cleansed Cleansed with saline   Dressing/Treatment Gauze dressing/dressing sponge;Roll gauze   Wound Length (cm) 11 cm   Wound Width (cm) 5 cm   Wound Depth (cm) 0.1 cm   Wound Surface Area (cm^2) 55 cm^2   Wound Volume (cm^3) 5.5 cm^3   Wound Assessment Blood filled blister;Pink/red   Drainage Amount Moderate   Drainage Description Serosanguinous   Odor None   Tammie-wound Assessment Fragile   Margins Attached edges   Wound Thickness Description not for Pressure Injury Full thickness   Pain Assessment   Pain Assessment None - Denies Pain   Patient taking ASA

## 2022-10-11 NOTE — WOUND CARE
Discharge Instructions for  Aleena Webb  [address] [facility] [address]  [address] [place] [address], [address]  Phone [phone]   Fax [phone]      NAME:  Sapna Deluca OF BIRTH:  1935  MEDICAL RECORD NUMBER:  [id number]  DATE:  10/11/2022    Return Appointment:   1 week with Lum Alert, DO    Instructions: right elbow:  Cleanse with normal saline or wound cleanser. Xeroform- apply to wound bed. Cover with rolled gauze or retention netting. Change three times per week at the wound center (M,W,F). Do not get wound wet in shower, pool or tub. May purchase cast cover at local pharmacy to keep dry in shower. Increase dietary protein to 60 grams or more per day to help with tissue regrowth/ healing. May use liquid supplements such as Glucerna, Ensure Max, & Bhaskar (samples & coupons sent with patient). Debridement done at today's visit. Should you experience increased redness, swelling, pain, foul odor, size of wound(s), or have a temperature over 101 degrees please contact the [address] [place] [address] Road at [phone] or if after hours contact your primary care physician or go to the hospital emergency department. PLEASE NOTE: IF YOU ARE UNABLE TO OBTAIN WOUND SUPPLIES, CONTINUE TO USE THE SUPPLIES YOU HAVE AVAILABLE UNTIL YOU ARE ABLE TO REACH US. IT IS MOST IMPORTANT TO KEEP THE WOUND COVERED AT ALL TIMES.     Electronically signed Judith Bains RN on 10/11/2022 at 8:56 AM

## 2022-10-12 ENCOUNTER — HOSPITAL ENCOUNTER (OUTPATIENT)
Dept: WOUND CARE | Age: 87
Discharge: HOME | End: 2022-10-12
Payer: MEDICARE

## 2022-10-12 VITALS
SYSTOLIC BLOOD PRESSURE: 116 MMHG | HEART RATE: 90 BPM | HEIGHT: 72 IN | DIASTOLIC BLOOD PRESSURE: 58 MMHG | BODY MASS INDEX: 19.5 KG/M2 | TEMPERATURE: 97.2 F | WEIGHT: 144 LBS | RESPIRATION RATE: 18 BRPM

## 2022-10-12 PROCEDURE — 99213 OFFICE O/P EST LOW 20 MIN: CPT

## 2022-10-12 NOTE — FLOWSHEET NOTE
10/12/22 0901   Wound 10/11/22 Arm Posterior;Right elbow   Date First Assessed/Time First Assessed: 10/11/22 0834   Present on Hospital Admission: Yes  Wound Approximate Age at First Assessment (Weeks): 1 weeks  Primary Wound Type: Traumatic  Location: Arm  Wound Location Orientation: Posterior;Right  Wound D...    Wound Image    Wound Etiology Traumatic   Dressing Status Intact   Wound Cleansed Cleansed with saline   Dressing/Treatment Xeroform   Wound Length (cm) 10.5 cm   Wound Width (cm) 1.5 cm   Wound Depth (cm) 0.1 cm   Wound Surface Area (cm^2) 15.75 cm^2   Change in Wound Size % (l*w) 71.36   Wound Volume (cm^3) 1.575 cm^3   Wound Healing % 71   Wound Assessment Granulation tissue   Drainage Amount Moderate   Drainage Description Serosanguinous   Odor None   Tammie-wound Assessment Fragile   Wound Thickness Description not for Pressure Injury Full thickness

## 2022-10-13 PROBLEM — E11.9 DIABETES MELLITUS TYPE 2, CONTROLLED (HCC): Chronic | Status: ACTIVE | Noted: 2022-01-25

## 2022-10-13 PROBLEM — S51.019A SKIN TEAR OF ELBOW WITHOUT COMPLICATION, INITIAL ENCOUNTER: Status: ACTIVE | Noted: 2022-10-13

## 2022-10-13 RX ORDER — CLOBETASOL PROPIONATE 0.5 MG/G
OINTMENT TOPICAL ONCE
Status: CANCELLED | OUTPATIENT
Start: 2022-10-13 | End: 2022-10-13

## 2022-10-13 RX ORDER — LIDOCAINE 50 MG/G
OINTMENT TOPICAL ONCE
Status: CANCELLED | OUTPATIENT
Start: 2022-10-13 | End: 2022-10-13

## 2022-10-13 RX ORDER — GENTAMICIN SULFATE 1 MG/G
OINTMENT TOPICAL ONCE
Status: CANCELLED | OUTPATIENT
Start: 2022-10-13 | End: 2022-10-13

## 2022-10-13 RX ORDER — LIDOCAINE 40 MG/G
CREAM TOPICAL ONCE
Status: CANCELLED | OUTPATIENT
Start: 2022-10-13 | End: 2022-10-13

## 2022-10-13 RX ORDER — BETAMETHASONE DIPROPIONATE 0.05 %
OINTMENT (GRAM) TOPICAL ONCE
Status: CANCELLED | OUTPATIENT
Start: 2022-10-13 | End: 2022-10-13

## 2022-10-13 RX ORDER — GINSENG 100 MG
CAPSULE ORAL ONCE
Status: CANCELLED | OUTPATIENT
Start: 2022-10-13 | End: 2022-10-13

## 2022-10-13 RX ORDER — BACITRACIN, NEOMYCIN, POLYMYXIN B 400; 3.5; 5 [USP'U]/G; MG/G; [USP'U]/G
OINTMENT TOPICAL ONCE
Status: CANCELLED | OUTPATIENT
Start: 2022-10-13 | End: 2022-10-13

## 2022-10-13 RX ORDER — LIDOCAINE HYDROCHLORIDE 40 MG/ML
SOLUTION TOPICAL ONCE
Status: CANCELLED | OUTPATIENT
Start: 2022-10-13 | End: 2022-10-13

## 2022-10-13 RX ORDER — LIDOCAINE HYDROCHLORIDE 20 MG/ML
JELLY TOPICAL ONCE
Status: CANCELLED | OUTPATIENT
Start: 2022-10-13 | End: 2022-10-13

## 2022-10-13 RX ORDER — BACITRACIN ZINC AND POLYMYXIN B SULFATE 500; 1000 [USP'U]/G; [USP'U]/G
OINTMENT TOPICAL ONCE
Status: CANCELLED | OUTPATIENT
Start: 2022-10-13 | End: 2022-10-13

## 2022-10-13 NOTE — PROGRESS NOTES
[address] [facility]   History and Physical Note   Referring Provider:   LISSY Scott NP     Reason for Referral: Skin injury to right elbow after fall. [address] [facility] [address] RECORD NUMBER:  [id number]  AGE: 81 y.o. GENDER: male  : 1935  EPISODE DATE:  10/11/2022    Chief complaint and reason for visit:     Chief Complaint   Patient presents with    Wound Check     right elbow         HISTORY of PRESENT ILLNESS HPI     Salomon Canseco is a 81 y.o. male who presents today for an initial evaluation of a wound/ulcer. Patient is new to the wound center. Wound duration:  3 day(s). History of Wound Context: Patient comes in today with family. He stumbled down 2 stairs landing on his right elbow. Sustained a skin tear. Area has been cleaned up but has been persistent. He comes in today for evaluation of this. Some back of his right elbow above and below the joint. He denies hitting his head. States he bleeds pretty easy. Pertinent associated symptoms: drainage     PAST MEDICAL HISTORY        Diagnosis Date    Diabetes ([address]     Hypertension        PAST SURGICAL HISTORY  Past Surgical History:   Procedure Laterality Date    FRACTURE SURGERY         FAMILY HISTORY  History reviewed. No pertinent family history.     SOCIAL HISTORY  Social History     Tobacco Use    Smoking status: Never    Smokeless tobacco: Never   Substance Use Topics    Alcohol use: No    Drug use: No       ALLERGIES  No Known Allergies    MEDICATIONS  Current Outpatient Medications on File Prior to Encounter   Medication Sig Dispense Refill    allopurinol (ZYLOPRIM) 100 MG tablet Take 50 mg by mouth daily      amLODIPine (NORVASC) 5 MG tablet Take 5 mg by mouth daily      atorvastatin (LIPITOR) 20 MG tablet Take 20 mg by mouth      furosemide (LASIX) 40 MG tablet Take 40 mg by mouth daily      sodium zirconium cyclosilicate (LOKELMA) 10 g PACK oral suspension Take 10 g by mouth SITagliptin (JANUVIA) 100 MG tablet Take 100 mg by mouth daily      aspirin 81 MG EC tablet Take 81 mg by mouth daily      cyanocobalamin 500 MCG tablet Take by mouth daily      metFORMIN (GLUCOPHAGE) 500 MG tablet Take by mouth 2 times daily (with meals)       No current facility-administered medications on file prior to encounter. REVIEW OF SYSTEMS  A comprehensive review of systems was negative except for: Pertinent items are noted in HPI. Objective:      /65   Pulse 86   Temp 97.7 °F (36.5 °C) (Oral)   Ht 6' (1.829 m)   Wt 144 lb (65.3 kg)   BMI 19.53 kg/m²     Wt Readings from Last 3 Encounters:   10/12/22 144 lb (65.3 kg)   10/11/22 144 lb (65.3 kg)       PHYSICAL EXAM  General Appearance/Constitutional: alert and oriented to person, place and time,  and in no acute distress. Nontoxic. Skin: warm and dry, no rash, positive wound per LDA documentation if applicable. Head: normocephalic and atraumatic. Eyes: extraocular eye movements intact, conjunctivae normal, and sclera anicteric. ENT: hearing grossly normal bilaterally. Normal appearance. Pulmonary/Chest: no chest wall tenderness and clear anteriorly. No respiratory distress. Cardiovascular: normal rate and regular rhythm. GI: abdomen soft, non-tender and non-distended. Musculoskeletal: normal range of motion of joints. Nontender calves. No cyanosis. Nontender calves. Edema trace  Neurologic: no gross cranial nerve deficit and speech normal. No focal deficits. Mental status normal.    Medical Decision Making:     Patient's wound needs some significant clean up and remove all of clots. This was done and patient tolerated well. We will cover with Xeroform and utilize a Tubigrip. I believe he will do very well. Increase protein intake. Assessment required other independent historian(s): Yes. Additional Historian: patient  and spouse.    Comorbid conditions affecting wound healing: As noted in [address] and [facility] [place] which was reviewed. Problem List Items Addressed This Visit          Other    * (Principal) Skin tear of elbow without complication, initial encounter       Wounds and Treatment Plan:      Other diagnoses or problems addressed:      Pertinent labs reviewed. Review of medical records and external note (s) from other providers was done for this visit. New lab or imaging orders placed:  No new orders    Prescription drug management: N/A     Risk of complications and/or mortality of patient management: This patient has a minimal risk of morbidity and mortality from additional diagnostic testing or treatment. This is due to the above conditions affecting wound healing as well as patient and procedure risk factors. Education and discussion held with patient regarding these disease processes pertinent to wound(s). Other pertinent decisions include: minor surgery or procedures as below. The patient's diagnosis or treatment is  significantly limited by social determinants of health as noted by: nutritional resource limitations . Discussion of management or test interpretation with N/A. Time spent with patient and patient care issues above the usual time needed for wound assessment and treatment was: [] 15-20 min  [] 21-30 min  [x] 31-44 min  [] 45 min or more  This included time retrieving and reviewing records with patient and education provided to patient regarding disease process(es), offloading or pressure relief, nutrition needed for wound healing, smoking cessation when applicable, and infection risk.     This time also included physician non-face-to-face service time visit on the date of service such as  Preparing to see the patient (eg, review of tests)  Obtaining and/or reviewing separately obtained history  Performing a medically necessary appropriate examination and/or evaluation  Counseling and educating the patient/family/caregiver  Ordering medications, tests, or procedures  Referring and communicating with other health care professionals as needed  Documenting clinical information in the electronic or other health record  Independently interpreting results (not reported separately) and communicating results to the patient/family/caregiver  Care coordination (not reported separately)    Wound 10/11/22 Arm Posterior;Right elbow (Active)   Wound Image   10/12/22 0901   Wound Etiology Traumatic 10/12/22 0901   Dressing Status Intact 10/12/22 0901   Wound Cleansed Cleansed with saline 10/12/22 0901   Dressing/Treatment Xeroform 10/12/22 0901   Wound Length (cm) 10.5 cm 10/12/22 0901   Wound Width (cm) 1.5 cm 10/12/22 0901   Wound Depth (cm) 0.1 cm 10/12/22 0901   Wound Surface Area (cm^2) 15.75 cm^2 10/12/22 0901   Change in Wound Size % (l*w) 71.36 10/12/22 0901   Wound Volume (cm^3) 1.575 cm^3 10/12/22 0901   Wound Healing % 71 10/12/22 0901   Post-Procedure Length (cm) 11 cm 10/11/22 0826   Post-Procedure Width (cm) 5 cm 10/11/22 0826   Post-Procedure Depth (cm) 0.1 cm 10/11/22 0826   Post-Procedure Surface Area (cm^2) 55 cm^2 10/11/22 0826   Post-Procedure Volume (cm^3) 5.5 cm^3 10/11/22 0826   Wound Assessment Granulation tissue 10/12/22 0901   Drainage Amount Moderate 10/12/22 0901   Drainage Description Serosanguinous 10/12/22 0901   Odor None 10/12/22 0901   Tammie-wound Assessment Fragile 10/12/22 0901   Margins Attached edges 10/11/22 0826   Wound Thickness Description not for Pressure Injury Full thickness 10/12/22 0901   Number of days: 2          Procedures done this visit:   Procedure Note  Indications:  Based on my examination of this patient's wound(s)/ulcer(s) today, debridement is required to promote healing and evaluate the wound base. Performed by:  Elba Branch DO  Consent obtained:  Yes  Time out taken:  Yes  Pain Control:     Debridement: Excisional Debridement  Using curette, scissors, and forceps the wound(s)/ulcer(s) was/were debrided down through and including the removal of epidermis, dermis, and subcutaneous tissue. Devitalized Tissue Debrided:  fibrin, biofilm, slough, necrotic/eschar, and clots  Pre Debridement Measurements:  Are located in the [facility]  Documentation Flow Sheet  Diabetic/Pressure/Non Pressure Ulcers only:  Ulcer: Non-Pressure ulcer, fat layer exposed   Wound/Ulcer #: 1  Post Debridement Measurements:  Wound/Ulcer Descriptions are Pre Debridement except measurements: Total Surface Area Debrided:  10 sq cm   Estimated Blood Loss:  Minimal  Hemostasis Achieved:  by pressure  Procedural Pain:  1  / 10   Post Procedural Pain:  1 / 10   Response to treatment:  Well tolerated by patient. Written patient dismissal instructions given to patient and signed by patient or POA. Patient voiced understanding that the importance of adherence to instructions is paramount to wound healing improvement or success.      Electronically signed by Zaheer Sheffield DO on 10/13/2022 at 11:40 AM

## 2022-10-14 ENCOUNTER — HOSPITAL ENCOUNTER (OUTPATIENT)
Dept: WOUND CARE | Age: 87
Discharge: HOME | End: 2022-10-14
Payer: MEDICARE

## 2022-10-14 VITALS
OXYGEN SATURATION: 96 % | WEIGHT: 144 LBS | SYSTOLIC BLOOD PRESSURE: 125 MMHG | DIASTOLIC BLOOD PRESSURE: 63 MMHG | RESPIRATION RATE: 18 BRPM | TEMPERATURE: 97.9 F | BODY MASS INDEX: 19.5 KG/M2 | HEART RATE: 91 BPM | HEIGHT: 72 IN

## 2022-10-14 PROCEDURE — 99213 OFFICE O/P EST LOW 20 MIN: CPT

## 2022-10-14 NOTE — WOUND CARE
Patient was having difficulty moving his right lower leg today while walking and transferring in wound center. Patient did not report any dizziness or other weakness. Patient appeared not to comprehend the severity his current functional limitations. Patient's sister (his emergency contact) was called to check on patient and make sure that he got home safely. She expressed appreciation for notification.

## 2022-10-14 NOTE — FLOWSHEET NOTE
10/14/22 0925   Wound 10/11/22 Arm Posterior;Right elbow   Date First Assessed/Time First Assessed: 10/11/22 0834   Present on Hospital Admission: Yes  Wound Approximate Age at First Assessment (Weeks): 1 weeks  Primary Wound Type: Traumatic  Location: Arm  Wound Location Orientation: Posterior;Right  Wound D... Wound Image    Wound Etiology Traumatic   Dressing Status Intact   Wound Cleansed Cleansed with saline   Dressing/Treatment Xeroform   Wound Length (cm) 9 cm   Wound Width (cm) 2 cm   Wound Depth (cm) 0.1 cm   Wound Surface Area (cm^2) 18 cm^2   Change in Wound Size % (l*w) 67.27   Wound Volume (cm^3) 1.8 cm^3   Wound Healing % 67   Wound Assessment Granulation tissue; Epithelialization   Drainage Amount Small   Drainage Description Serosanguinous   Odor None   Tammie-wound Assessment Fragile   Wound Thickness Description not for Pressure Injury Full thickness   Patient is currently taking Aspirin 81 mg

## 2022-10-18 ENCOUNTER — HOSPITAL ENCOUNTER (EMERGENCY)
Dept: CT IMAGING | Age: 87
Discharge: HOME | DRG: 871 | End: 2022-10-21
Payer: MEDICARE

## 2022-10-18 ENCOUNTER — HOSPITAL ENCOUNTER (INPATIENT)
Age: 87
LOS: 7 days | Discharge: NURSING, REHAB OR LONG-TERM CARE | DRG: 871 | End: 2022-10-25
Attending: EMERGENCY MEDICINE | Admitting: FAMILY MEDICINE
Payer: MEDICARE

## 2022-10-18 ENCOUNTER — HOSPITAL ENCOUNTER (EMERGENCY)
Dept: GENERAL RADIOLOGY | Age: 87
Discharge: HOME | DRG: 871 | End: 2022-10-21
Payer: MEDICARE

## 2022-10-18 DIAGNOSIS — E27.8 LEFT ADRENAL MASS (HCC): ICD-10-CM

## 2022-10-18 DIAGNOSIS — S32.414A CLOSED NONDISPLACED FRACTURE OF ANTERIOR WALL OF RIGHT ACETABULUM, INITIAL ENCOUNTER (HCC): ICD-10-CM

## 2022-10-18 DIAGNOSIS — N17.9 ACUTE RENAL FAILURE, UNSPECIFIED ACUTE RENAL FAILURE TYPE (HCC): Primary | ICD-10-CM

## 2022-10-18 DIAGNOSIS — R53.1 GENERALIZED WEAKNESS: ICD-10-CM

## 2022-10-18 DIAGNOSIS — S32.591A INFERIOR PUBIC RAMUS FRACTURE, RIGHT, CLOSED, INITIAL ENCOUNTER (HCC): ICD-10-CM
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
## 2022-10-18 PROBLEM — S39.91XA ABDOMINAL TRAUMA, INITIAL ENCOUNTER: Status: ACTIVE | Noted: 2022-10-18

## 2022-10-18 PROBLEM — N17.0 ACUTE RENAL FAILURE WITH TUBULAR NECROSIS (HCC): Status: ACTIVE | Noted: 2022-10-18

## 2022-10-18 PROBLEM — S32.414G: Status: ACTIVE | Noted: 2022-10-18

## 2022-10-18 PROBLEM — R93.5 ABNORMAL CT OF THE ABDOMEN: Status: ACTIVE | Noted: 2022-10-18

## 2022-10-18 PROBLEM — S30.1XXA CONTUSION OF ABDOMINAL WALL: Status: ACTIVE | Noted: 2022-10-18

## 2022-10-18 PROBLEM — R33.9 URINARY RETENTION: Status: ACTIVE | Noted: 2022-10-18

## 2022-10-18 LAB
ALBUMIN SERPL-MCNC: 3.3 G/DL (ref 3.2–4.6)
ALBUMIN/GLOB SERPL: 0.9 {RATIO} (ref 0.4–1.6)
ALP SERPL-CCNC: 91 U/L (ref 50–136)
ALT SERPL-CCNC: 18 U/L (ref 12–65)
ANION GAP SERPL CALC-SCNC: 7 MMOL/L (ref 2–11)
AST SERPL-CCNC: 16 U/L (ref 15–37)
BASOPHILS # BLD: 0.1 K/UL (ref 0–0.2)
BASOPHILS NFR BLD: 1 % (ref 0–2)
BILIRUB SERPL-MCNC: 0.7 MG/DL (ref 0.2–1.1)
BUN SERPL-MCNC: 80 MG/DL (ref 8–23)
CALCIUM SERPL-MCNC: 9.2 MG/DL (ref 8.3–10.4)
CHLORIDE SERPL-SCNC: 109 MMOL/L (ref 101–110)
CO2 SERPL-SCNC: 24 MMOL/L (ref 21–32)
CREAT SERPL-MCNC: 3.28 MG/DL (ref 0.8–1.5)
DIFFERENTIAL METHOD BLD: ABNORMAL
EOSINOPHIL # BLD: 0.1 K/UL (ref 0–0.8)
EOSINOPHIL NFR BLD: 1 % (ref 0.5–7.8)
ERYTHROCYTE [DISTWIDTH] IN BLOOD BY AUTOMATED COUNT: 15.9 % (ref 11.9–14.6)
GLOBULIN SER CALC-MCNC: 3.8 G/DL (ref 2.8–4.5)
GLUCOSE SERPL-MCNC: 272 MG/DL (ref 65–100)
HCT VFR BLD AUTO: 31.9 % (ref 41.1–50.3)
HGB BLD-MCNC: 10 G/DL (ref 13.6–17.2)
IMM GRANULOCYTES # BLD AUTO: 0.3 K/UL (ref 0–0.5)
IMM GRANULOCYTES NFR BLD AUTO: 3 % (ref 0–5)
LACTATE SERPL-SCNC: 1.5 MMOL/L (ref 0.4–2)
LYMPHOCYTES # BLD: 0.5 K/UL (ref 0.5–4.6)
LYMPHOCYTES NFR BLD: 5 % (ref 13–44)
MCH RBC QN AUTO: 30.4 PG (ref 26.1–32.9)
MCHC RBC AUTO-ENTMCNC: 31.3 G/DL (ref 31.4–35)
MCV RBC AUTO: 97 FL (ref 82–102)
MONOCYTES # BLD: 0.7 K/UL (ref 0.1–1.3)
MONOCYTES NFR BLD: 6 % (ref 4–12)
NEUTS SEG # BLD: 8.8 K/UL (ref 1.7–8.2)
NEUTS SEG NFR BLD: 84 % (ref 43–78)
NRBC # BLD: 0 K/UL (ref 0–0.2)
PLATELET # BLD AUTO: 217 K/UL (ref 150–450)
PMV BLD AUTO: 10.7 FL (ref 9.4–12.3)
POTASSIUM SERPL-SCNC: 4.9 MMOL/L (ref 3.5–5.1)
PROCALCITONIN SERPL-MCNC: 0.09 NG/ML (ref 0–0.49)
PROT SERPL-MCNC: 7.1 G/DL (ref 6.3–8.2)
RBC # BLD AUTO: 3.29 M/UL (ref 4.23–5.6)
SODIUM SERPL-SCNC: 140 MMOL/L (ref 133–143)
WBC # BLD AUTO: 10.4 K/UL (ref 4.3–11.1)

## 2022-10-18 PROCEDURE — 72125 CT NECK SPINE W/O DYE: CPT

## 2022-10-18 PROCEDURE — 71250 CT THORAX DX C-: CPT

## 2022-10-18 PROCEDURE — 6360000002 HC RX W HCPCS: Performed by: SURGERY

## 2022-10-18 PROCEDURE — 80053 COMPREHEN METABOLIC PANEL: CPT

## 2022-10-18 PROCEDURE — 2580000003 HC RX 258: Performed by: NURSE PRACTITIONER

## 2022-10-18 PROCEDURE — 85025 COMPLETE CBC W/AUTO DIFF WBC: CPT

## 2022-10-18 PROCEDURE — C9113 INJ PANTOPRAZOLE SODIUM, VIA: HCPCS | Performed by: SURGERY

## 2022-10-18 PROCEDURE — 93005 ELECTROCARDIOGRAM TRACING: CPT | Performed by: EMERGENCY MEDICINE

## 2022-10-18 PROCEDURE — 87040 BLOOD CULTURE FOR BACTERIA: CPT

## 2022-10-18 PROCEDURE — 99285 EMERGENCY DEPT VISIT HI MDM: CPT | Performed by: PHYSICIAN ASSISTANT

## 2022-10-18 PROCEDURE — 2000000000 HC ICU R&B

## 2022-10-18 PROCEDURE — 70450 CT HEAD/BRAIN W/O DYE: CPT

## 2022-10-18 PROCEDURE — 99223 1ST HOSP IP/OBS HIGH 75: CPT | Performed by: SURGERY

## 2022-10-18 PROCEDURE — 51702 INSERT TEMP BLADDER CATH: CPT

## 2022-10-18 PROCEDURE — 2580000003 HC RX 258: Performed by: PHYSICIAN ASSISTANT

## 2022-10-18 PROCEDURE — 73080 X-RAY EXAM OF ELBOW: CPT

## 2022-10-18 PROCEDURE — 2500000003 HC RX 250 WO HCPCS: Performed by: NURSE PRACTITIONER

## 2022-10-18 PROCEDURE — 6370000000 HC RX 637 (ALT 250 FOR IP): Performed by: SURGERY

## 2022-10-18 PROCEDURE — 83605 ASSAY OF LACTIC ACID: CPT

## 2022-10-18 PROCEDURE — A4216 STERILE WATER/SALINE, 10 ML: HCPCS | Performed by: SURGERY

## 2022-10-18 PROCEDURE — 2580000003 HC RX 258: Performed by: SURGERY

## 2022-10-18 PROCEDURE — 84145 PROCALCITONIN (PCT): CPT

## 2022-10-18 RX ORDER — SODIUM CHLORIDE 0.9 % (FLUSH) 0.9 %
5-40 SYRINGE (ML) INJECTION PRN
Status: DISCONTINUED | OUTPATIENT
Start: 2022-10-18 | End: 2022-10-25 | Stop reason: HOSPADM

## 2022-10-18 RX ORDER — ONDANSETRON 4 MG/1
4 TABLET, ORALLY DISINTEGRATING ORAL EVERY 8 HOURS PRN
Status: DISCONTINUED | OUTPATIENT
Start: 2022-10-18 | End: 2022-10-25 | Stop reason: HOSPADM

## 2022-10-18 RX ORDER — MAGNESIUM HYDROXIDE/ALUMINUM HYDROXICE/SIMETHICONE 120; 1200; 1200 MG/30ML; MG/30ML; MG/30ML
30 SUSPENSION ORAL ONCE
Status: COMPLETED | OUTPATIENT
Start: 2022-10-18 | End: 2022-10-18

## 2022-10-18 RX ORDER — SODIUM CHLORIDE 0.9 % (FLUSH) 0.9 %
5-40 SYRINGE (ML) INJECTION EVERY 12 HOURS SCHEDULED
Status: DISCONTINUED | OUTPATIENT
Start: 2022-10-18 | End: 2022-10-25 | Stop reason: HOSPADM

## 2022-10-18 RX ORDER — MAGNESIUM HYDROXIDE/ALUMINUM HYDROXICE/SIMETHICONE 120; 1200; 1200 MG/30ML; MG/30ML; MG/30ML
30 SUSPENSION ORAL EVERY 6 HOURS PRN
Status: DISCONTINUED | OUTPATIENT
Start: 2022-10-18 | End: 2022-10-18

## 2022-10-18 RX ORDER — 0.9 % SODIUM CHLORIDE 0.9 %
1000 INTRAVENOUS SOLUTION INTRAVENOUS ONCE
Status: COMPLETED | OUTPATIENT
Start: 2022-10-18 | End: 2022-10-18

## 2022-10-18 RX ORDER — ACETAMINOPHEN 325 MG/1
650 TABLET ORAL EVERY 6 HOURS PRN
Status: DISCONTINUED | OUTPATIENT
Start: 2022-10-18 | End: 2022-10-25 | Stop reason: HOSPADM

## 2022-10-18 RX ORDER — SODIUM CHLORIDE 9 MG/ML
INJECTION, SOLUTION INTRAVENOUS CONTINUOUS
Status: DISCONTINUED | OUTPATIENT
Start: 2022-10-18 | End: 2022-10-19

## 2022-10-18 RX ORDER — ONDANSETRON 2 MG/ML
4 INJECTION INTRAMUSCULAR; INTRAVENOUS EVERY 6 HOURS PRN
Status: DISCONTINUED | OUTPATIENT
Start: 2022-10-18 | End: 2022-10-25 | Stop reason: HOSPADM

## 2022-10-18 RX ORDER — SODIUM CHLORIDE 9 MG/ML
INJECTION, SOLUTION INTRAVENOUS PRN
Status: DISCONTINUED | OUTPATIENT
Start: 2022-10-18 | End: 2022-10-25 | Stop reason: HOSPADM

## 2022-10-18 RX ORDER — ACETAMINOPHEN 650 MG/1
650 SUPPOSITORY RECTAL EVERY 6 HOURS PRN
Status: DISCONTINUED | OUTPATIENT
Start: 2022-10-18 | End: 2022-10-25 | Stop reason: HOSPADM

## 2022-10-18 RX ADMIN — ALUMINUM HYDROXIDE, MAGNESIUM HYDROXIDE, DIMETHICONE 30 ML: 200; 200; 20 LIQUID ORAL at 20:52

## 2022-10-18 RX ADMIN — SODIUM CHLORIDE 40 MG: 9 INJECTION, SOLUTION INTRAMUSCULAR; INTRAVENOUS; SUBCUTANEOUS at 19:16

## 2022-10-18 RX ADMIN — SODIUM CHLORIDE 1000 ML: 900 INJECTION, SOLUTION INTRAVENOUS at 13:15

## 2022-10-18 RX ADMIN — TUBERCULIN PURIFIED PROTEIN DERIVATIVE 5 UNITS: 5 INJECTION, SOLUTION INTRADERMAL at 22:26

## 2022-10-18 RX ADMIN — SODIUM CHLORIDE: 9 INJECTION, SOLUTION INTRAVENOUS at 22:23

## 2022-10-18 RX ADMIN — SODIUM CHLORIDE, PRESERVATIVE FREE 10 ML: 5 INJECTION INTRAVENOUS at 22:23

## 2022-10-18 ASSESSMENT — ENCOUNTER SYMPTOMS
GASTROINTESTINAL NEGATIVE: 1
COUGH: 1

## 2022-10-18 ASSESSMENT — PAIN SCALES - GENERAL: PAINLEVEL_OUTOF10: 0

## 2022-10-18 NOTE — ED TRIAGE NOTES
Patient comes via ems from home co increase fatigue. Patient met sepsis criteria for ems and was given 3.375 zosyn and 400 cc NS.

## 2022-10-18 NOTE — ED PROVIDER NOTES
Emergency Department Provider Note                   PCP:                LISSY Carlos NP               Age: 81 y.o. Sex: male       ICD-10-CM    1. Acute renal failure, unspecified acute renal failure type ([facility]  N17.9       2. Generalized weakness  R53.1       3. Closed nondisplaced fracture of anterior wall of right acetabulum, initial encounter ([facility]  S32.414A       4. Inferior pubic ramus fracture, right, closed, initial encounter [facility])  S32.591A           DISPOSITION Decision To Admit 10/18/2022 04:25:17 PM        MDM  Number of Diagnoses or Management Options  Acute renal failure, unspecified acute renal failure type ([facility]  Closed nondisplaced fracture of anterior wall of right acetabulum, initial encounter ([facility])  Generalized weakness  Inferior pubic ramus fracture, right, closed, initial encounter [facility])  Diagnosis management comments: Patient is an 81-year-old male who presents with generalized fatigue and increased urinary frequency. He was tachycardic in route with EMS and met sepsis criteria so Zosyn was given. Thought symptoms could be related to UTI, sepsis, pneumonia, electrolyte imbalance, dehydration, traumatic injury. In the emergency department his vital signs have been stable. He has a normal white blood cell count. Hemoglobin stable. He does have acute renal failure with a creatinine of 3. He and his family member kept alluding to the fall he had 2 weeks ago and it sounds like he has steadily worsened since then. CTs ordered. Work-up reveals right acetabular fracture and right inferior pubic ramus fracture along with inflammation versus hemorrhage around the right upper quadrant and down into the spermatic cord area. Also there is a left adrenal mass versus hemorrhage. I did consult general surgery. Dr. Xenia Cruz will evaluate patient once they are out of surgery. I also spoke to Dr. Ger Mendez of orthopedic surgery via [employer] who will consult on patient. Hospitalist consulted for admission. Patient is stable at this time. Amount and/or Complexity of Data Reviewed  Discuss the patient with other providers: yes Candice Marshall, Dr. Luiz Greene, Dr. Arthuro Harada)    Risk of Complications, Morbidity, and/or Mortality  Presenting problems: high  Diagnostic procedures: high  Management options: high    Patient Progress  Patient progress: stable             Orders Placed This Encounter   Procedures    Culture, Blood 1    Culture, Blood 1    CT Head W/O Contrast    CT CSpine W/O Contrast    CT CHEST ABDOMEN PELVIS WO CONTRAST Additional Contrast? None    XR ELBOW RIGHT (MIN 3 VIEWS)    Lactic Acid    Lactic Acid    CBC with Auto Differential    CMP    Procalcitonin    Cardiac Monitor - ED Only    Straight Cath (Select if patient is unable to provide a sample)    Inpatient consult to Orthopedic Surgery    EKG 12 Lead    Saline lock IV        Medications   0.9 % sodium chloride bolus (0 mLs IntraVENous Stopped 10/18/22 3036)       New Prescriptions    No medications on file        Gay Cogan is a 81 y.o. male who presents to the Emergency Department with chief complaint of    Chief Complaint   Patient presents with    Fatigue      Patient is an 76-year-old male with history of diabetes, hypertension, hyperlipidemia who presents with generalized fatigue. He states he woke up at 10:00 this morning which is abnormal for him. We tried to get out of bed he was so weak that it took him a long time to get his walker to stand up. Over the past few days he has had increased urinary frequency and dysuria. Denies fevers. He evidently fell about 2 weeks ago and fell backwards striking his head and his elbow. Since then he has been complaining of back pain and right hip pain. He is also had some cough. His sister who is with him says that she is unsure if these symptoms are related to an injury from the fall, or unrelated.   She feels like he has not had an appropriate work-up for his fall 2 weeks ago. He seems to have been going downhill over the past week. He has been complaining of pain to his buttock as well when he sits down. To me he denies any pain other than some mild right hip pain. He has had negative x-ray of the right hip. He denies abdominal pain, chest pain, headache or neck pain. He was given IV fluids and Zosyn in route by EMS due to meeting their sepsis protocol. Review of Systems   Constitutional:  Positive for activity change and fatigue. HENT: Negative. Respiratory:  Positive for cough. Cardiovascular: Negative. Gastrointestinal: Negative. Genitourinary:  Positive for dysuria, frequency and urgency. Neurological:  Positive for weakness. All other systems reviewed and are negative. Past Medical History:   Diagnosis Date    Diabetes ([facility]     Hypertension         Past Surgical History:   Procedure Laterality Date    FRACTURE SURGERY          No family history on file. Social History     Socioeconomic History    Marital status:    Tobacco Use    Smoking status: Never    Smokeless tobacco: Never   Substance and Sexual Activity    Alcohol use: No    Drug use: No         Patient has no known allergies. Previous Medications    ALLOPURINOL (ZYLOPRIM) 100 MG TABLET    Take 50 mg by mouth daily    AMLODIPINE (NORVASC) 5 MG TABLET    Take 5 mg by mouth daily    ASPIRIN 81 MG EC TABLET    Take 81 mg by mouth daily    ATORVASTATIN (LIPITOR) 20 MG TABLET    Take 20 mg by mouth    CYANOCOBALAMIN 500 MCG TABLET    Take by mouth daily    FUROSEMIDE (LASIX) 40 MG TABLET    Take 40 mg by mouth daily    METFORMIN (GLUCOPHAGE) 500 MG TABLET    Take by mouth 2 times daily (with meals)    SITAGLIPTIN (JANUVIA) 100 MG TABLET    Take 100 mg by mouth daily    SODIUM ZIRCONIUM CYCLOSILICATE (LOKELMA) 10 G PACK ORAL SUSPENSION    Take 10 g by mouth        Vitals signs and nursing note reviewed.    Patient Vitals for the sustained in fall 2 weeks ago. COMPARISON: None. TECHNIQUE:   5 mm axial scans from the skull base to the vertex. Our CT  scanners use one or more of the following:  Automated exposure control,  adjustment of the mA and or kV according to patient size, iterative  reconstruction. FINDINGS:    No acute intraparenchymal hemorrhage or abnormal extra-axial fluid collection. The ventricles are normal size. Bilateral white matter low attenuation is present, nonspecific, likely chronic  small vessel disease. Symmetric volume loss. No midline shift or mass effect. Posterior fossa: Unremarkable. Included portion of the:   Paranasal sinuses and mastoid air cells are clear. Globes and orbits grossly unremarkable. No skull fracture. Impression    Negative for acute intracranial abnormality. Chronic changes. CT CSpine W/O Contrast    Narrative    CT CERVICAL SPINE WITHOUT CONTRAST. INDICATION: Neck injury sustained in fall 2 weeks ago. .    TECHNIQUE: 1.25 mm axial scans from the skull base into the upper chest  performed, and sagittal and coronal reconstructed images performed. Radiation  dose reduction techniques were used for this study. Our CT scanners use one or  more of the following:  Automated exposure control, adjustment of the mA and or  kV according to patient size, iterative reconstruction. FINDINGS:   Skull base is unremarkable. .    Included portion of the mastoid air cells are clear    Craniocervical junction is unremarkable. Odontoid appears intact. Degenerative changes and developmental joint. The bony ring of C1, C2, C3, C4, C5, C6, C7, T1 and T2 are intact. Facet arthropathy throughout. Lung apices without pneumothorax. Bronchiectasis and peribronchial thickening. Chronic inflammatory changes right lung apex. Alignment anatomic. Precervical soft tissues are demonstrate carotid calcifications. No acute fractures. The thyroid appears uniform. Impression    1) Negative for acute cervical spine fracture. 2) Facet arthropathy and spondylosis and carotid atherosclerosis. 3) Chronic bronchiectasis and peribronchial thickening and chronic inflammatory  change right lung apex. CT CHEST ABDOMEN PELVIS WO CONTRAST Additional Contrast? None    Narrative    CT OF THE CHEST ABDOMEN AND PELVIS without intravenous and without oral  contrast.    INDICATION: Trauma sustained in fall 2 weeks ago. Back pain and hip pain    TECHNIQUE:  Multiple axial images were obtained through the chest, abdomen and  pelvis. Radiation dose reduction techniques were used for this study. All CT  scans performed at this facility use one or all of the following: Automated  exposure control, adjustment of the mA and/or kVp according to patient's size,  iterative reconstruction. COMPARISON: None    FINDINGS:  -LUNGS:   No pneumothorax. No pulmonary contusion. Bronchiectasis and peribronchial thickening with some consolidation in the right  upper lobe which appears chronic. -MEDIASTINUM/AXILLA: No mediastinal hematoma. No gross adenopathy. -HEART/VESSELS: Heart appears anemic. -CHEST WALL/BONES: No gross rib fractures. -STERNUM: No gross fracture. -LIVER: Uniform on this noncontrast exam.  -GALLBLADDER/BILE DUCTS: No gallstones or bile duct dilation. -PANCREAS: Atrophic.  -SPLEEN: Uniform on this noncontrast exam.    -ADRENALS: Left adrenal is enlarged, 3.4 cm.  -KIDNEYS/URETERS: 3.5 cm and 4.0 cm cysts left kidney and 3.7 cm cyst right  kidney. -BLADDER: Quite distended. -REPRODUCTIVE ORGANS: Prostate is enlarged. There is stranding densities in the right spermatic cord which track up into the  abdomen. -BOWEL: Normal caliber. -LYMPH NODES: No significant retroperitoneal, mesenteric, or pelvic adenopathy. -BONES:   Fracture right inferior pubic ramus. There is some callus formation. Fracture anterior lip of the right acetabulum.    Moderate facet arthropathy throughout the lumbar spine. -VASCULATURE: Aorta is densely calcified. -OTHER: There is stranding densities in the right upper quadrant adjacent to the  gallbladder and first and second portion of the duodenum and in Morison's pouch. This tracks inferiorly. Impression    Exam limited due to lack of intravenous contrast.   1) Nondisplaced fracture anterior lip right acetabulum and fracture inferior  pubic ramus on the right with small amount of callus formation. 2) Stranding densities in the right upper quadrant, near the neck the  gallbladder, first and second portion of the duodenum and in Morison's pouch. This could be acute inflammation or posttraumatic hemorrhage. 3) Stranding densities tract along the right abdomen down into the spermatic  cord and upper scrotum on the right, inflammatory versus posttraumatic. 4) A 3.4 cm left adrenal mass, neoplasia versus adrenal hemorrhage. 5) Urinary bladder is quite distended. XR ELBOW RIGHT (MIN 3 VIEWS)    Narrative    RIGHT ELBOW 3 views. INDICATION:  Elbow pain following injury sustained in fall 2 weeks ago. .    TECHNIQUE: AP and lateral and oblique views. COMPARISON: None. FINDINGS: The radial head appears intact. No abnormal fat pad signs. No fracture  or dislocation. There are osteophytes off the radial head. Impression    Negative for acute fracture. Osteoarthritis.      Lactic Acid   Result Value Ref Range    Lactic Acid, Plasma 1.5 0.4 - 2.0 MMOL/L   CBC with Auto Differential   Result Value Ref Range    WBC 10.4 4.3 - 11.1 K/uL    RBC 3.29 (L) 4.23 - 5.6 M/uL    Hemoglobin 10.0 (L) 13.6 - 17.2 g/dL    Hematocrit 31.9 (L) 41.1 - 50.3 %    MCV 97.0 82.0 - 102.0 FL    MCH 30.4 26.1 - 32.9 PG    MCHC 31.3 (L) 31.4 - 35.0 g/dL    RDW 15.9 (H) 11.9 - 14.6 %    Platelets 811 983 - 412 K/uL    MPV 10.7 9.4 - 12.3 FL    nRBC 0.00 0.0 - 0.2 K/uL    Differential Type AUTOMATED      Seg Neutrophils 84 (H) 43 - 78 % Lymphocytes 5 (L) 13 - 44 %    Monocytes 6 4.0 - 12.0 %    Eosinophils % 1 0.5 - 7.8 %    Basophils 1 0.0 - 2.0 %    Immature Granulocytes 3 0.0 - 5.0 %    Segs Absolute 8.8 (H) 1.7 - 8.2 K/UL    Absolute Lymph # 0.5 0.5 - 4.6 K/UL    Absolute Mono # 0.7 0.1 - 1.3 K/UL    Absolute Eos # 0.1 0.0 - 0.8 K/UL    Basophils Absolute 0.1 0.0 - 0.2 K/UL    Absolute Immature Granulocyte 0.3 0.0 - 0.5 K/UL   CMP   Result Value Ref Range    Sodium 140 133 - 143 mmol/L    Potassium 4.9 3.5 - 5.1 mmol/L    Chloride 109 101 - 110 mmol/L    CO2 24 21 - 32 mmol/L    Anion Gap 7 2 - 11 mmol/L    Glucose 272 (H) 65 - 100 mg/dL    BUN 80 (H) 8 - 23 MG/DL    Creatinine 3.28 (H) 0.8 - 1.5 MG/DL    Est, Glom Filt Rate 18 (L) >60 ml/min/1.73m2    Calcium 9.2 8.3 - 10.4 MG/DL    Total Bilirubin 0.7 0.2 - 1.1 MG/DL    ALT 18 12 - 65 U/L    AST 16 15 - 37 U/L    Alk Phosphatase 91 50 - 136 U/L    Total Protein 7.1 6.3 - 8.2 g/dL    Albumin 3.3 3.2 - 4.6 g/dL    Globulin 3.8 2.8 - 4.5 g/dL    Albumin/Globulin Ratio 0.9 0.4 - 1.6     Procalcitonin   Result Value Ref Range    Procalcitonin 0.09 0.00 - 0.49 ng/mL        CT Head W/O Contrast   Final Result   Negative for acute intracranial abnormality. Chronic changes. CT CSpine W/O Contrast   Final Result   1) Negative for acute cervical spine fracture. 2) Facet arthropathy and spondylosis and carotid atherosclerosis. 3) Chronic bronchiectasis and peribronchial thickening and chronic inflammatory   change right lung apex. CT CHEST ABDOMEN PELVIS WO CONTRAST Additional Contrast? None   Final Result   Exam limited due to lack of intravenous contrast.    1) Nondisplaced fracture anterior lip right acetabulum and fracture inferior   pubic ramus on the right with small amount of callus formation. 2) Stranding densities in the right upper quadrant, near the neck the   gallbladder, first and second portion of the duodenum and in Morison's pouch.    This could be acute inflammation or posttraumatic hemorrhage. 3) Stranding densities tract along the right abdomen down into the spermatic   cord and upper scrotum on the right, inflammatory versus posttraumatic. 4) A 3.4 cm left adrenal mass, neoplasia versus adrenal hemorrhage. 5) Urinary bladder is quite distended. XR ELBOW RIGHT (MIN 3 VIEWS)   Final Result   Negative for acute fracture. Osteoarthritis. Voice dictation software was used during the making of this note. This software is not perfect and grammatical and other typographical errors may be present. This note has not been completely proofread for errors.        FRANCESCA Goetz  10/18/22 [address], [place]  10/18/22 [id number]

## 2022-10-18 NOTE — CONSULTS
H&P/Consult Note/Progress Note/Office Note:   Samantha Miles  MRN: [id number]  :1935  Age:87 y.o.    HPI: Samantha Miles is a 81 y.o. male who came to the ER on 10/18/22 via EMS with fatigue and increased urinary frequency. He was tachycardiac and met sepsis criteria. Bld culture drawn  IV Zosyn initiated      In ER WBC and LFTs normal  Creatinine >3    He tales daily aspirin  No acid reduction therapy. He described falling 2 weeks prior  He doesn't think he was taking NSAIDs after the fall   He is followed by Dr Arabella Gallego at the wound center for a right elbow traumatic abrasions after a prior fall (Last seen on 10/14/22)      Imaging in ER identified right acetabular fracture and ramus fracture  CT as below with RUQ inflamm changes  Gen Surgery was consulted            10/18/22 CT chest/abd/pelvis without contrast  Hx: Trauma sustained in fall 2 weeks ago. Back pain and hip pain      FINDINGS:   -LUNGS: No pneumothorax. No pulmonary contusion. Bronchiectasis and peribronchial thickening with some consolidation in the right upper lobe which appears chronic. -MEDIASTINUM/AXILLA: No mediastinal hematoma. No gross adenopathy. -HEART/VESSELS: Heart appears anemic. -CHEST WALL/BONES: No gross rib fractures. -STERNUM: No gross fracture. -LIVER: Uniform on this noncontrast exam.   -GALLBLADDER/BILE DUCTS: No gallstones or bile duct dilation. -PANCREAS: Atrophic.   -SPLEEN: Uniform on this noncontrast exam.     -ADRENALS: Left adrenal is enlarged, 3.4 cm.   -KIDNEYS/URETERS: 3.5 cm and 4.0 cm cysts left kidney and 3.7 cm cyst right kidney. -BLADDER: Quite distended. -REPRODUCTIVE ORGANS: Prostate is enlarged. There is stranding densities in the right spermatic cord which track up into the abdomen. -BOWEL: Normal caliber. -LYMPH NODES: No significant retroperitoneal, mesenteric, or pelvic adenopathy. -BONES: Fracture right inferior pubic ramus.  There is some callus formation. Fracture anterior lip of the right acetabulum. Moderate facet arthropathy throughout the lumbar spine. -VASCULATURE: Aorta is densely calcified. -OTHER: There is stranding densities in the RUQ adjacent to the gallbladder and 1st and 2nd portion of the duodenum and in Morison's pouch. This tracks inferiorly. Exam limited due to lack of intravenous contrast.     Impression:  1) Nondisplaced fracture anterior lip right acetabulum and fracture inferior pubic ramus on the right with small amount of callus formation. 2) Stranding densities in the right upper quadrant, near the neck the gallbladder, first and second portion of the duodenum and in Morison's pouch. This could be acute inflammation or posttraumatic hemorrhage. 3) Stranding densities tract along the right abdomen down into the spermatic cord and upper scrotum on the right, inflammatory versus posttraumatic. 4) A 3.4 cm left adrenal mass, neoplasia versus adrenal hemorrhage. 5) Urinary bladder is quite distended.            Past Medical History:   Diagnosis Date    Diabetes ([facility] [address]     Hypertension      Past Surgical History:   Procedure Laterality Date    FRACTURE SURGERY       Current Facility-Administered Medications   Medication Dose Route Frequency    pantoprazole (PROTONIX) 40 mg in sodium chloride (PF) 10 mL injection  40 mg IntraVENous Daily    aluminum & magnesium hydroxide-simethicone (MAALOX) 200-200-20 MG/5ML suspension 30 mL  30 mL Oral Once     Current Outpatient Medications   Medication Sig    allopurinol (ZYLOPRIM) 100 MG tablet Take 50 mg by mouth daily    amLODIPine (NORVASC) 5 MG tablet Take 5 mg by mouth daily    aspirin 81 MG EC tablet Take 81 mg by mouth daily    atorvastatin (LIPITOR) 20 MG tablet Take 20 mg by mouth    cyanocobalamin 500 MCG tablet Take by mouth daily    furosemide (LASIX) 40 MG tablet Take 40 mg by mouth daily    sodium zirconium cyclosilicate (LOKELMA) 10 g PACK oral suspension Take 10 g by mouth    SITagliptin (JANUVIA) 100 MG tablet Take 100 mg by mouth daily    metFORMIN (GLUCOPHAGE) 500 MG tablet Take by mouth 2 times daily (with meals)     ALLERGIES:  Patient has no known allergies. Social History     Socioeconomic History    Marital status:      Spouse name: None    Number of children: None    Years of education: None    Highest education level: None   Tobacco Use    Smoking status: Never    Smokeless tobacco: Never   Substance and Sexual Activity    Alcohol use: No    Drug use: No     Social History     Tobacco Use   Smoking Status Never   Smokeless Tobacco Never     History reviewed. No pertinent family history. ROS: The patient has no difficulty with chest pain or shortness of breath. No fever or chills. Comprehensive review of systems was otherwise unremarkable except as noted above. Physical Exam:   /63   Pulse 86   Temp 97.8 °F (36.6 °C) (Oral)   Resp (!) 41   Ht 6' (1.829 m)   Wt 144 lb (65.3 kg)   SpO2 100%   BMI 19.53 kg/m²   Vitals:    10/18/22 1628 10/18/22 1728 10/18/22 1759 10/18/22 1859   BP: (!) 117/59 135/63 (!) 118/56 137/63   Pulse: 76 93 80 86   Resp: 19 19 26 (!) 41   Temp:       TempSrc:       SpO2:       Weight:       Height:         No intake/output data recorded. No intake/output data recorded. Constitutional: Alert, oriented, cooperative patient in no acute distress; appears stated age    Eyes:Sclera are clear. EOMs intact  ENMT: no external lesions; +difficulty hearing; no obvious neck masses, no ear or lip lesions, nares normal  CV: RRR. Normal perfusion  Resp: No JVD. Breathing is  non-labored; no audible wheezing. GI: soft and non-distended; very mild subjective tenderness to palpation right abdomen     Musculoskeletal: unremarkable with normal function. No embolic signs or cyanosis.    Neuro:  Oriented; moves all 4; no focal deficits  Psychiatric: normal affect and mood, no memory impairment    Recent vitals (if inpt):  Patient Vitals for the past 24 hrs:   BP Temp Temp src Pulse Resp SpO2 Height Weight   10/18/22 1859 137/63 -- -- 86 (!) 41 -- -- --   10/18/22 1759 (!) 118/56 -- -- 80 26 -- -- --   10/18/22 1728 135/63 -- -- 93 19 -- -- --   10/18/22 1628 (!) 117/59 -- -- 76 19 -- -- --   10/18/22 1528 (!) 121/58 -- -- 79 18 -- -- --   10/18/22 1324 123/60 -- -- 85 14 100 % -- --   10/18/22 1215 136/64 97.8 °F (36.6 °C) Oral 96 20 100 % 6' (1.829 m) 144 lb (65.3 kg)       Amount and/or Complexity of Data Reviewed and Analyzed:  I reviewed and analyzed all of the unique labs and radiologic studies that are shown below as well as any that are in the HPI, and any that are in the expanded problem list below  *Each unique test, order, or document contributes to the combination of 2 or combination of 3 in Category 1 below. For this visit I also reviewed old records and prior notes.       Recent Labs     10/18/22  1229   WBC 10.4   HGB 10.0*         K 4.9      CO2 24   BUN 80*   ALT 18     Review of most recent CBC  Lab Results   Component Value Date    WBC 10.4 10/18/2022    HGB 10.0 (L) 10/18/2022    HCT 31.9 (L) 10/18/2022    MCV 97.0 10/18/2022     10/18/2022       Review of most recent BMP  Lab Results   Component Value Date/Time     10/18/2022 12:29 PM    K 4.9 10/18/2022 12:29 PM     10/18/2022 12:29 PM    CO2 24 10/18/2022 12:29 PM    BUN 80 10/18/2022 12:29 PM    CREATININE 3.28 10/18/2022 12:29 PM    GLUCOSE 272 10/18/2022 12:29 PM    CALCIUM 9.2 10/18/2022 12:29 PM        Review of most recent LFTs (and lipase if done)  Lab Results   Component Value Date    ALT 18 10/18/2022    AST 16 10/18/2022    ALKPHOS 91 10/18/2022    BILITOT 0.7 10/18/2022     No results found for: LIPASE    No results found for: INR, APTT, LCAD    Review of most recent HgbA1c  No results found for: LABA1C    Nutritional assessment screen for wound healing issues:  Lab Results   Component Value Date LABALBU 3.3 10/18/2022       @lastcovr@    Xray Result (most recent):  XR ELBOW RIGHT (MIN 3 VIEWS) 10/18/2022    Narrative  RIGHT ELBOW 3 views. INDICATION:  Elbow pain following injury sustained in fall 2 weeks ago. .    TECHNIQUE: AP and lateral and oblique views. COMPARISON: None. FINDINGS: The radial head appears intact. No abnormal fat pad signs. No fracture  or dislocation. There are osteophytes off the radial head. Impression  Negative for acute fracture. Osteoarthritis. CT Result (most recent):  CT CHEST ABDOMEN PELVIS WO CONTRAST 10/18/2022    Narrative  CT OF THE CHEST ABDOMEN AND PELVIS without intravenous and without oral  contrast.    INDICATION: Trauma sustained in fall 2 weeks ago. Back pain and hip pain    TECHNIQUE:  Multiple axial images were obtained through the chest, abdomen and  pelvis. Radiation dose reduction techniques were used for this study. All CT  scans performed at this facility use one or all of the following: Automated  exposure control, adjustment of the mA and/or kVp according to patient's size,  iterative reconstruction. COMPARISON: None    FINDINGS:  -LUNGS:  No pneumothorax. No pulmonary contusion. Bronchiectasis and peribronchial thickening with some consolidation in the right  upper lobe which appears chronic. -MEDIASTINUM/AXILLA: No mediastinal hematoma. No gross adenopathy. -HEART/VESSELS: Heart appears anemic. -CHEST WALL/BONES: No gross rib fractures. -STERNUM: No gross fracture. -LIVER: Uniform on this noncontrast exam.  -GALLBLADDER/BILE DUCTS: No gallstones or bile duct dilation. -PANCREAS: Atrophic.  -SPLEEN: Uniform on this noncontrast exam.    -ADRENALS: Left adrenal is enlarged, 3.4 cm.  -KIDNEYS/URETERS: 3.5 cm and 4.0 cm cysts left kidney and 3.7 cm cyst right  kidney. -BLADDER: Quite distended. -REPRODUCTIVE ORGANS: Prostate is enlarged.   There is stranding densities in the right spermatic cord which track up into the  abdomen. -BOWEL: Normal caliber. -LYMPH NODES: No significant retroperitoneal, mesenteric, or pelvic adenopathy. -BONES:  Fracture right inferior pubic ramus. There is some callus formation. Fracture anterior lip of the right acetabulum. Moderate facet arthropathy throughout the lumbar spine. -VASCULATURE: Aorta is densely calcified. -OTHER: There is stranding densities in the right upper quadrant adjacent to the  gallbladder and first and second portion of the duodenum and in Morison's pouch. This tracks inferiorly. Impression  Exam limited due to lack of intravenous contrast.  1) Nondisplaced fracture anterior lip right acetabulum and fracture inferior  pubic ramus on the right with small amount of callus formation. 2) Stranding densities in the right upper quadrant, near the neck the  gallbladder, first and second portion of the duodenum and in Morison's pouch. This could be acute inflammation or posttraumatic hemorrhage. 3) Stranding densities tract along the right abdomen down into the spermatic  cord and upper scrotum on the right, inflammatory versus posttraumatic. 4) A 3.4 cm left adrenal mass, neoplasia versus adrenal hemorrhage. 5) Urinary bladder is quite distended. US Result (most recent):  No results found for this or any previous visit from the past 3650 days.       Admission date (for inpatients): 10/18/2022   * No surgery found *  * No surgery found *        ASSESSMENT/PLAN:  [unfilled]  Principal Problem:    Abdominal trauma, initial encounter  Active Problems:    Abnormal CT abd with RUQ inflammation    Closed nondisplaced fracture of anterior wall of right acetabulum (HCC)    Inferior pubic ramus fracture, right, closed, initial encounter ([facility] [address]    Acute renal failure     Fall    Contusion of abdominal wall    Generalized weakness    Acute renal failure (HCC)    Closed nondisplaced fracture of anterior wall of right acetabulum with delayed healing    Urinary retention  Resolved Problems:    * No resolved hospital problems.  *     Patient Active Problem List    Diagnosis Date Noted    Abnormal CT abd with RUQ inflammation 10/18/2022     Priority: Medium    Closed nondisplaced fracture of anterior wall of right acetabulum ([address] 10/18/2022     Priority: Medium    Inferior pubic ramus fracture, right, closed, initial encounter ([address] 10/18/2022     Priority: Medium    Acute renal failure  10/18/2022     Priority: Medium    Fall 10/18/2022     Priority: Medium    Contusion of abdominal wall 10/18/2022     Priority: Medium    Abdominal trauma, initial encounter 10/18/2022     Priority: Medium    Generalized weakness 10/18/2022     Priority: Medium    Acute renal failure ([address] 10/18/2022     Priority: Medium    Closed nondisplaced fracture of anterior wall of right acetabulum with delayed healing 10/18/2022     Priority: Medium    Urinary retention 10/18/2022     Priority: Medium    Skin tear of elbow without complication, initial encounter 10/13/2022     Priority: Medium    Normocytic anemia 01/26/2022    Hypertension 01/25/2022    Diabetes mellitus type 2, controlled ([address] 01/25/2022    Acute kidney injury ([address] 01/25/2022    Hyperkalemia 01/25/2022          Number and Complexity of Problems addressed and   Risks of complications and/or morbidity of management        Right acetabular and inferior rami fracture  Ortho consulted  Admit inpt    Acute renal failure with   Urinary retention with bladder distention on CT and increased frequency prior to admission  Aguilar-->gravity  Hydrate  Follow BMP daily  Urology consulted    RUQ inflammation on CT  Possibly NSAID use after fall with duodenitis  Maalox x 1  Protonix daily  Follow      Adrenal mass  Outpt Endocrinology follow-up    Elbow contusions after prior trauma  Local wound care              Level of MDM (2/3 elements below)  Number and Complexity of Problems Addressed Amount and/or Complexity of Data to be Reviewed and Analyzed  *Each unique test, order, or document contributes to the combination of 2 or combination of 3 in Category 1 below. Risk of Complications and/or Morbidity or Mortality of pt Management     63876  71155 SF Minimal  ?1self-limited or minor problem Minimal or none Minimal risk of morbidity from additional diagnostic testing or Rx   70635  68587 Low Low  ? 2or more self-limited or minor problems;    or  ? 1stable chronic illness;    or  ?1acute, uncomplicated illness or injury   Limited  (Must meet the requirements of at least 1 of the 2 categories)  Category 1: Tests and documents   ? Any combination of 2 from the following:  ?Review of prior external note(s) from each unique source*;  ?review of the result(s) of each unique test*;   ?ordering of each unique test*    or   Category 2: Assessment requiring an independent historian(s)  (For the categories of independent interpretation of tests and discussion of management or test interpretation, see moderate or high) Low risk of morbidity from additional diagnostic testing or treatment     80466  71135 Mod Moderate  ? 1or more chronic illnesses with exacerbation, progression, or side effects of treatment;    or  ?2or more stable chronic illnesses;    or  ?1undiagnosed new problem with uncertain prognosis;    or  ?1acute illness with systemic symptoms;    or  ?1acute complicated injury   Moderate  (Must meet the requirements of at least 1 out of 3 categories)  Category 1: Tests, documents, or independent historian(s)  ? Any combination of 3 from the following:   ?Review of prior external note(s) from each unique source*;  ?Review of the result(s) of each unique test*;  ?Ordering of each unique test*;  ?Assessment requiring an independent historian(s)    or  Category 2: Independent interpretation of tests   ? Independent interpretation of a test performed by another physician/other qualified health care professional (not separately reported);     or  Category 3: Discussion of management or test interpretation  ? Discussion of management or test interpretation with external physician/other qualified health care professional/appropriate source (not separately reported)   Moderate risk of morbidity from additional diagnostic testing or treatment  Examples only:  ?Prescription drug management   ? Decision regarding minor surgery with identified patient or procedure risk factors  ? Decision regarding elective major surgery without identified patient or procedure risk factors   ? Diagnosis or treatment significantly limited by social determinants of health       09099  50869 High High  ? 1or more chronic illnesses with severe exacerbation, progression, or side effects of treatment;    or  ?1 acute or chronic illness or injury that poses a threat to life or bodily function   Extensive  (Must meet the requirements of at least 2 out of 3 categories)  Category 1: Tests, documents, or independent historian(s)  ? Any combination of 3 from the following:   ?Review of prior external note(s) from each unique source*;  ?Review of the result(s) of each unique test*;   ?Ordering of each unique test*;   ?Assessment requiring an independent historian(s)    or   Category 2: Independent interpretation of tests   ? Independent interpretation of a test performed by another physician/other qualified health care professional (not separately reported);     or  Category 3: Discussion of management or test interpretation  ? Discussion of management or test interpretation with external physician/other qualified health care professional/appropriate source (not separately reported)   High risk of morbidity from additional diagnostic testing or treatment  Examples only:  ?Drug therapy requiring intensive monitoring for toxicity  ? Decision regarding elective major surgery with identified patient or procedure risk factors  ? Decision regarding emergency major surgery  ? Decision regarding hospitalization  ? Decision not to resuscitate or to de-escalate care because of poor prognosis             I have personally performed a face-to-face diagnostic evaluation and management  service on this patient. I have independently seen the patient. I have independently obtained the above history from the patient/family. I have independently examined the patient with above findings. I have independently reviewed data/labs for this patient and developed the above plan of care (MDM).       Signed: Art Portillo MD  10/18/2022    7:45 PM

## 2022-10-18 NOTE — H&P
Hospitalist History and Physical   Admit Date:  10/18/2022 12:14 PM   Name:  Benita Andres   Age:  81 y.o. Sex:  male  :  1935   MRN:  [id number]   Room:  ER03/03    Presenting Complaint: Fatigue     Reason(s) for Admission: Abdominal trauma, initial encounter [S39.91XA]     History of Present Illness:   Benita Andres is a 81 y.o. male with medical history of  DM II, HTN, hyperkalemia secondary to bactrim use who presented with c/o generalized fatigue and increased urinary frequency. Pt reports a fall backwards down his brick stairs about 2 weeks ago and c/o abd pain since. Started having urinary incontinence last night. CT head neg for acute findings. CT c-spine neg for acute findings. CT chest/abd/pelvis with extensive findings of abdominal trauma (see full report below). Also found to have an right inferior pubic ramus fx and nondisplaced fx of the anterior wall of the right acetabulum. Ortho surgery (Dr. To Ac) consulted per ER. General Surgery (Dr. Felipe Ly) contacted per ER. BC sent. Also with TIGIST with creatinine at 3.28 baseline around 1.3. Pt hemodynamically stable. Denies CP, SOB, n/v/d. Review of Systems:  10 systems reviewed and negative except as noted in HPI.   Assessment & Plan:     Principal Problem:    Abdominal trauma, initial encounter  Consult General Surgery  Serial H/H  Keep NPO  IVF      Closed nondisplaced fracture of anterior wall of right acetabulum with delayed healing  Consult Ortho      Inferior pubic ramus fracture, right, closed, initial encounter ([facility] [address]  Consult Ortho      Acute renal failure   Consult Nephrology  Hold nephrotoxic meds  IVF  Aguilar  BMP in AM          Anticipated discharge needs:     Pending clinical course    Diet: NPO  VTE ppx: SCD  Code status: Full    Hospital Problems:  Principal Problem:    Abdominal trauma, initial encounter  Active Problems:    Abnormal CT abd with RUQ inflammation    Closed nondisplaced fracture of anterior wall of right acetabulum with delayed healing    Inferior pubic ramus fracture, right, closed, initial encounter ([facility] [address]    Acute renal failure     Fall    Contusion of abdominal wall  Resolved Problems:    * No resolved hospital problems. *       Past History:     Past Medical History:   Diagnosis Date    Diabetes ([facility] [address]     Hypertension      Past Surgical History:   Procedure Laterality Date    FRACTURE SURGERY          Social History     Tobacco Use    Smoking status: Never    Smokeless tobacco: Never   Substance Use Topics    Alcohol use: No      Social History     Substance and Sexual Activity   Drug Use No       History reviewed. No pertinent family history. Immunization History   Administered Date(s) Administered    COVID-19, MODERNA BLUE border, Primary or Immunocompromised, (age 12y+), IM, 100 mcg/0.5mL 01/20/2021, 02/17/2021, 10/28/2021    PPD Test 01/26/2022     No Known Allergies  Prior to Admit Medications:  Current Outpatient Medications   Medication Instructions    allopurinol (ZYLOPRIM) 50 mg, Oral, DAILY    amLODIPine (NORVASC) 5 mg, Oral, DAILY    aspirin 81 mg, Oral, DAILY    atorvastatin (LIPITOR) 20 mg, Oral    cyanocobalamin 500 MCG tablet Oral, DAILY    furosemide (LASIX) 40 mg, Oral, DAILY    metFORMIN (GLUCOPHAGE) 500 MG tablet Oral, 2 TIMES DAILY WITH MEALS    SITagliptin (JANUVIA) 100 mg, Oral, DAILY    sodium zirconium cyclosilicate (LOKELMA) 10 g, Oral         Objective:   Patient Vitals for the past 24 hrs:   Temp Pulse Resp BP SpO2   10/18/22 1528 -- 79 18 (!) 121/58 --   10/18/22 1324 -- 85 14 123/60 100 %   10/18/22 1215 97.8 °F (36.6 °C) 96 20 136/64 100 %       Oxygen Therapy  SpO2: 100 %  O2 Device: None (Room air)    Estimated body mass index is 19.53 kg/m² as calculated from the following:    Height as of this encounter: 6' (1.829 m). Weight as of this encounter: 144 lb (65.3 kg).   No intake or output data in the 24 hours ending 10/18/22 1750      Physical Exam:    Blood pressure (!) 121/58, pulse 79, temperature 97.8 °F (36.6 °C), temperature source Oral, resp. rate 18, height 6' (1.829 m), weight 144 lb (65.3 kg), SpO2 100 %. General:    Well nourished. Head:  Normocephalic, atraumatic  Eyes:  Sclerae appear normal.  Pupils equally round. ENT:  Nares appear normal, no drainage. Moist oral mucosa  Neck:  No restricted ROM. Trachea midline   CV:   RRR. No m/r/g. No jugular venous distension. Lungs:   CTAB. No wheezing, rhonchi, or rales. Symmetric expansion. Abdomen: Bowel sounds present. Soft, tenderness RLQ on palpation, palpable bladder distention. Extremities: No cyanosis or clubbing. No edema  Skin:     No rashes and normal coloration. Warm and dry. Neuro:  CN II-XII grossly intact. Sensation intact. A&Ox3  Psych:  Normal mood and affect.       I have personally reviewed labs and tests showing:  Recent Labs:  Recent Results (from the past 24 hour(s))   Lactic Acid    Collection Time: 10/18/22 12:29 PM   Result Value Ref Range    Lactic Acid, Plasma 1.5 0.4 - 2.0 MMOL/L   CBC with Auto Differential    Collection Time: 10/18/22 12:29 PM   Result Value Ref Range    WBC 10.4 4.3 - 11.1 K/uL    RBC 3.29 (L) 4.23 - 5.6 M/uL    Hemoglobin 10.0 (L) 13.6 - 17.2 g/dL    Hematocrit 31.9 (L) 41.1 - 50.3 %    MCV 97.0 82.0 - 102.0 FL    MCH 30.4 26.1 - 32.9 PG    MCHC 31.3 (L) 31.4 - 35.0 g/dL    RDW 15.9 (H) 11.9 - 14.6 %    Platelets 417 835 - 421 K/uL    MPV 10.7 9.4 - 12.3 FL    nRBC 0.00 0.0 - 0.2 K/uL    Differential Type AUTOMATED      Seg Neutrophils 84 (H) 43 - 78 %    Lymphocytes 5 (L) 13 - 44 %    Monocytes 6 4.0 - 12.0 %    Eosinophils % 1 0.5 - 7.8 %    Basophils 1 0.0 - 2.0 %    Immature Granulocytes 3 0.0 - 5.0 %    Segs Absolute 8.8 (H) 1.7 - 8.2 K/UL    Absolute Lymph # 0.5 0.5 - 4.6 K/UL    Absolute Mono # 0.7 0.1 - 1.3 K/UL    Absolute Eos # 0.1 0.0 - 0.8 K/UL    Basophils Absolute 0.1 0.0 - 0.2 K/UL    Absolute Immature Granulocyte 0.3 0.0 - 0.5 K/UL CMP    Collection Time: 10/18/22 12:29 PM   Result Value Ref Range    Sodium 140 133 - 143 mmol/L    Potassium 4.9 3.5 - 5.1 mmol/L    Chloride 109 101 - 110 mmol/L    CO2 24 21 - 32 mmol/L    Anion Gap 7 2 - 11 mmol/L    Glucose 272 (H) 65 - 100 mg/dL    BUN 80 (H) 8 - 23 MG/DL    Creatinine 3.28 (H) 0.8 - 1.5 MG/DL    Est, Glom Filt Rate 18 (L) >60 ml/min/1.73m2    Calcium 9.2 8.3 - 10.4 MG/DL    Total Bilirubin 0.7 0.2 - 1.1 MG/DL    ALT 18 12 - 65 U/L    AST 16 15 - 37 U/L    Alk Phosphatase 91 50 - 136 U/L    Total Protein 7.1 6.3 - 8.2 g/dL    Albumin 3.3 3.2 - 4.6 g/dL    Globulin 3.8 2.8 - 4.5 g/dL    Albumin/Globulin Ratio 0.9 0.4 - 1.6     Procalcitonin    Collection Time: 10/18/22 12:29 PM   Result Value Ref Range    Procalcitonin 0.09 0.00 - 0.49 ng/mL       I have personally reviewed imaging studies showing:  XR ELBOW RIGHT (MIN 3 VIEWS)    Result Date: 10/18/2022  RIGHT ELBOW 3 views. INDICATION:  Elbow pain following injury sustained in fall 2 weeks ago. . TECHNIQUE: AP and lateral and oblique views. COMPARISON: None. FINDINGS: The radial head appears intact. No abnormal fat pad signs. No fracture or dislocation. There are osteophytes off the radial head. Negative for acute fracture. Osteoarthritis. CT Head W/O Contrast    Result Date: 10/18/2022  CT HEAD WITHOUT CONTRAST. INDICATION: Head injury sustained in fall 2 weeks ago. COMPARISON: None. TECHNIQUE:   5 mm axial scans from the skull base to the vertex. Our CT scanners use one or more of the following:  Automated exposure control, adjustment of the mA and or kV according to patient size, iterative reconstruction. FINDINGS:  No acute intraparenchymal hemorrhage or abnormal extra-axial fluid collection. The ventricles are normal size. Bilateral white matter low attenuation is present, nonspecific, likely chronic small vessel disease. Symmetric volume loss. No midline shift or mass effect. Posterior fossa: Unremarkable.  Included portion following: Automated exposure control, adjustment of the mA and/or kVp according to patient's size, iterative reconstruction. COMPARISON: None FINDINGS: -LUNGS: No pneumothorax. No pulmonary contusion. Bronchiectasis and peribronchial thickening with some consolidation in the right upper lobe which appears chronic. -MEDIASTINUM/AXILLA: No mediastinal hematoma. No gross adenopathy. -HEART/VESSELS: Heart appears anemic. -CHEST WALL/BONES: No gross rib fractures. -STERNUM: No gross fracture. -LIVER: Uniform on this noncontrast exam. -GALLBLADDER/BILE DUCTS: No gallstones or bile duct dilation. -PANCREAS: Atrophic. -SPLEEN: Uniform on this noncontrast exam. -ADRENALS: Left adrenal is enlarged, 3.4 cm. -KIDNEYS/URETERS: 3.5 cm and 4.0 cm cysts left kidney and 3.7 cm cyst right kidney. -BLADDER: Quite distended. -REPRODUCTIVE ORGANS: Prostate is enlarged. There is stranding densities in the right spermatic cord which track up into the abdomen. -BOWEL: Normal caliber. -LYMPH NODES: No significant retroperitoneal, mesenteric, or pelvic adenopathy. -BONES: Fracture right inferior pubic ramus. There is some callus formation. Fracture anterior lip of the right acetabulum. Moderate facet arthropathy throughout the lumbar spine. -VASCULATURE: Aorta is densely calcified. -OTHER: There is stranding densities in the right upper quadrant adjacent to the gallbladder and first and second portion of the duodenum and in Morison's pouch. This tracks inferiorly. Exam limited due to lack of intravenous contrast. 1) Nondisplaced fracture anterior lip right acetabulum and fracture inferior pubic ramus on the right with small amount of callus formation. 2) Stranding densities in the right upper quadrant, near the neck the gallbladder, first and second portion of the duodenum and in Morison's pouch. This could be acute inflammation or posttraumatic hemorrhage.  3) Stranding densities tract along the right abdomen down into the spermatic cord and upper scrotum on the right, inflammatory versus posttraumatic. 4) A 3.4 cm left adrenal mass, neoplasia versus adrenal hemorrhage. 5) Urinary bladder is quite distended. Echocardiogram:  No results found for this or any previous visit.         Orders Placed This Encounter   Medications    0.9 % sodium chloride bolus    pantoprazole (PROTONIX) 40 mg in sodium chloride (PF) 10 mL injection    aluminum & magnesium hydroxide-simethicone (MAALOX) 200-200-20 MG/5ML suspension 30 mL         Signed:  LISSY Winkler - CNP    Notes, labs, VS, diagnostic testing reviewed  Case discussed with pt, care team, Dr. Vlad Martinez, sister at bedside

## 2022-10-19 PROBLEM — N18.31 ACUTE RENAL FAILURE WITH ACUTE TUBULAR NECROSIS SUPERIMPOSED ON STAGE 3A CHRONIC KIDNEY DISEASE (HCC): Status: ACTIVE | Noted: 2022-10-18

## 2022-10-19 PROBLEM — D64.9 NORMOCYTIC ANEMIA: Status: ACTIVE | Noted: 2022-01-26

## 2022-10-19 PROBLEM — E78.5 HYPERLIPIDEMIA: Status: ACTIVE | Noted: 2021-09-01

## 2022-10-19 PROBLEM — N17.9 ACUTE KIDNEY INJURY (HCC): Status: RESOLVED | Noted: 2022-01-25 | Resolved: 2022-10-19

## 2022-10-19 PROBLEM — M25.551 PAIN IN RIGHT HIP: Status: ACTIVE | Noted: 2022-10-07

## 2022-10-19 PROBLEM — R65.20 SEPSIS WITH ACUTE RENAL FAILURE AND TUBULAR NECROSIS (HCC): Status: ACTIVE | Noted: 2022-10-19

## 2022-10-19 PROBLEM — M10.9 GOUT: Status: ACTIVE | Noted: 2021-09-01

## 2022-10-19 PROBLEM — E87.5 HYPERKALEMIA: Status: RESOLVED | Noted: 2022-01-25 | Resolved: 2022-10-19

## 2022-10-19 PROBLEM — M18.10: Status: ACTIVE | Noted: 2021-09-01

## 2022-10-19 PROBLEM — N17.0 SEPSIS WITH ACUTE RENAL FAILURE AND TUBULAR NECROSIS (HCC): Status: ACTIVE | Noted: 2022-10-19

## 2022-10-19 PROBLEM — N18.31 CHRONIC KIDNEY DISEASE, STAGE 3A (HCC): Status: ACTIVE | Noted: 2022-03-23

## 2022-10-19 PROBLEM — K52.9 GASTROENTERITIS: Status: RESOLVED | Noted: 2021-09-01 | Resolved: 2022-10-19

## 2022-10-19 PROBLEM — K52.9 GASTROENTERITIS: Status: ACTIVE | Noted: 2021-09-01

## 2022-10-19 PROBLEM — R97.20 ELEVATED PSA: Status: ACTIVE | Noted: 2021-09-01

## 2022-10-19 PROBLEM — L30.9 ECZEMA: Status: ACTIVE | Noted: 2021-09-01

## 2022-10-19 PROBLEM — L57.0 ACTINIC KERATOSIS: Status: ACTIVE | Noted: 2021-09-01

## 2022-10-19 PROBLEM — L03.113 CELLULITIS OF FOREARM, RIGHT: Status: RESOLVED | Noted: 2022-04-21 | Resolved: 2022-10-19

## 2022-10-19 PROBLEM — L03.113 CELLULITIS OF FOREARM, RIGHT: Status: ACTIVE | Noted: 2022-04-21

## 2022-10-19 PROBLEM — N18.31 CHRONIC KIDNEY DISEASE, STAGE 3A (HCC): Chronic | Status: ACTIVE | Noted: 2022-03-23

## 2022-10-19 PROBLEM — I10 PRIMARY HYPERTENSION: Chronic | Status: ACTIVE | Noted: 2022-01-25

## 2022-10-19 PROBLEM — M54.17 LUMBOSACRAL RADICULOPATHY: Status: ACTIVE | Noted: 2021-09-01

## 2022-10-19 PROBLEM — A41.9 SEPSIS WITH ACUTE RENAL FAILURE AND TUBULAR NECROSIS (HCC): Status: ACTIVE | Noted: 2022-10-19

## 2022-10-19 PROBLEM — R32 URINARY INCONTINENCE: Status: ACTIVE | Noted: 2022-01-12

## 2022-10-19 PROBLEM — E27.8 LEFT ADRENAL MASS (HCC): Status: ACTIVE | Noted: 2022-10-19

## 2022-10-19 PROBLEM — I10 HYPERTENSION: Status: ACTIVE | Noted: 2022-01-25

## 2022-10-19 LAB
ALBUMIN SERPL-MCNC: 2.7 G/DL (ref 3.2–4.6)
ALBUMIN/GLOB SERPL: 1 {RATIO} (ref 0.4–1.6)
ALP SERPL-CCNC: 79 U/L (ref 50–136)
ALT SERPL-CCNC: 16 U/L (ref 12–65)
ANION GAP SERPL CALC-SCNC: 6 MMOL/L (ref 2–11)
APPEARANCE UR: CLEAR
AST SERPL-CCNC: 17 U/L (ref 15–37)
BASOPHILS # BLD: 0.1 K/UL (ref 0–0.2)
BASOPHILS NFR BLD: 1 % (ref 0–2)
BILIRUB SERPL-MCNC: 0.4 MG/DL (ref 0.2–1.1)
BILIRUB UR QL: NEGATIVE
BUN SERPL-MCNC: 61 MG/DL (ref 8–23)
CALCIUM SERPL-MCNC: 8.2 MG/DL (ref 8.3–10.4)
CHLORIDE SERPL-SCNC: 112 MMOL/L (ref 101–110)
CK SERPL-CCNC: 191 U/L (ref 21–215)
CO2 SERPL-SCNC: 23 MMOL/L (ref 21–32)
COLOR UR: NORMAL
CREAT SERPL-MCNC: 2.03 MG/DL (ref 0.8–1.5)
DIFFERENTIAL METHOD BLD: ABNORMAL
EKG ATRIAL RATE: 97 BPM
EKG DIAGNOSIS: NORMAL
EKG P AXIS: 72 DEGREES
EKG P-R INTERVAL: 146 MS
EKG Q-T INTERVAL: 352 MS
EKG QRS DURATION: 86 MS
EKG QTC CALCULATION (BAZETT): 447 MS
EKG R AXIS: 55 DEGREES
EKG T AXIS: 64 DEGREES
EKG VENTRICULAR RATE: 97 BPM
EOSINOPHIL # BLD: 0.3 K/UL (ref 0–0.8)
EOSINOPHIL NFR BLD: 5 % (ref 0.5–7.8)
ERYTHROCYTE [DISTWIDTH] IN BLOOD BY AUTOMATED COUNT: 15.9 % (ref 11.9–14.6)
GLOBULIN SER CALC-MCNC: 2.8 G/DL (ref 2.8–4.5)
GLUCOSE SERPL-MCNC: 160 MG/DL (ref 65–100)
GLUCOSE UR STRIP.AUTO-MCNC: NEGATIVE MG/DL
HCT VFR BLD AUTO: 28 % (ref 41.1–50.3)
HCT VFR BLD AUTO: 28.2 % (ref 41.1–50.3)
HCT VFR BLD AUTO: 31.9 % (ref 41.1–50.3)
HGB BLD-MCNC: 10 G/DL (ref 13.6–17.2)
HGB BLD-MCNC: 8.9 G/DL (ref 13.6–17.2)
HGB BLD-MCNC: 9 G/DL (ref 13.6–17.2)
HGB UR QL STRIP: NEGATIVE
IMM GRANULOCYTES # BLD AUTO: 0.2 K/UL (ref 0–0.5)
IMM GRANULOCYTES NFR BLD AUTO: 2 % (ref 0–5)
KETONES UR QL STRIP.AUTO: NEGATIVE MG/DL
LEUKOCYTE ESTERASE UR QL STRIP.AUTO: NEGATIVE
LYMPHOCYTES # BLD: 0.7 K/UL (ref 0.5–4.6)
LYMPHOCYTES NFR BLD: 9 % (ref 13–44)
MCH RBC QN AUTO: 31.1 PG (ref 26.1–32.9)
MCHC RBC AUTO-ENTMCNC: 31.9 G/DL (ref 31.4–35)
MCV RBC AUTO: 97.6 FL (ref 82–102)
MM INDURATION, POC: 0 MM (ref 0–5)
MONOCYTES # BLD: 0.6 K/UL (ref 0.1–1.3)
MONOCYTES NFR BLD: 8 % (ref 4–12)
NEUTS SEG # BLD: 5.8 K/UL (ref 1.7–8.2)
NEUTS SEG NFR BLD: 76 % (ref 43–78)
NITRITE UR QL STRIP.AUTO: NEGATIVE
NRBC # BLD: 0 K/UL (ref 0–0.2)
PH UR STRIP: 5 [PH] (ref 5–9)
PLATELET # BLD AUTO: 191 K/UL (ref 150–450)
PMV BLD AUTO: 11 FL (ref 9.4–12.3)
POTASSIUM SERPL-SCNC: 4.7 MMOL/L (ref 3.5–5.1)
PPD, POC: NEGATIVE
PROCALCITONIN SERPL-MCNC: 0.08 NG/ML (ref 0–0.49)
PROT SERPL-MCNC: 5.5 G/DL (ref 6.3–8.2)
PROT UR STRIP-MCNC: NEGATIVE MG/DL
RBC # BLD AUTO: 2.89 M/UL (ref 4.23–5.6)
SODIUM SERPL-SCNC: 141 MMOL/L (ref 133–143)
SP GR UR REFRACTOMETRY: 1.02 (ref 1–1.02)
UROBILINOGEN UR QL STRIP.AUTO: 0.2 EU/DL (ref 0.2–1)
WBC # BLD AUTO: 7.6 K/UL (ref 4.3–11.1)

## 2022-10-19 PROCEDURE — C9113 INJ PANTOPRAZOLE SODIUM, VIA: HCPCS | Performed by: SURGERY

## 2022-10-19 PROCEDURE — 2580000003 HC RX 258: Performed by: SURGERY

## 2022-10-19 PROCEDURE — 36415 COLL VENOUS BLD VENIPUNCTURE: CPT

## 2022-10-19 PROCEDURE — 97165 OT EVAL LOW COMPLEX 30 MIN: CPT

## 2022-10-19 PROCEDURE — 51702 INSERT TEMP BLADDER CATH: CPT

## 2022-10-19 PROCEDURE — 80053 COMPREHEN METABOLIC PANEL: CPT

## 2022-10-19 PROCEDURE — 85014 HEMATOCRIT: CPT

## 2022-10-19 PROCEDURE — 2700000000 HC OXYGEN THERAPY PER DAY

## 2022-10-19 PROCEDURE — 94760 N-INVAS EAR/PLS OXIMETRY 1: CPT

## 2022-10-19 PROCEDURE — 97535 SELF CARE MNGMENT TRAINING: CPT

## 2022-10-19 PROCEDURE — 99222 1ST HOSP IP/OBS MODERATE 55: CPT | Performed by: NURSE PRACTITIONER

## 2022-10-19 PROCEDURE — 97163 PT EVAL HIGH COMPLEX 45 MIN: CPT

## 2022-10-19 PROCEDURE — 2580000003 HC RX 258: Performed by: NURSE PRACTITIONER

## 2022-10-19 PROCEDURE — 84145 PROCALCITONIN (PCT): CPT

## 2022-10-19 PROCEDURE — 2580000003 HC RX 258: Performed by: FAMILY MEDICINE

## 2022-10-19 PROCEDURE — 2000000000 HC ICU R&B

## 2022-10-19 PROCEDURE — 99232 SBSQ HOSP IP/OBS MODERATE 35: CPT | Performed by: SURGERY

## 2022-10-19 PROCEDURE — A4216 STERILE WATER/SALINE, 10 ML: HCPCS | Performed by: SURGERY

## 2022-10-19 PROCEDURE — 97530 THERAPEUTIC ACTIVITIES: CPT

## 2022-10-19 PROCEDURE — 81003 URINALYSIS AUTO W/O SCOPE: CPT

## 2022-10-19 PROCEDURE — 82550 ASSAY OF CK (CPK): CPT

## 2022-10-19 PROCEDURE — 85025 COMPLETE CBC W/AUTO DIFF WBC: CPT

## 2022-10-19 PROCEDURE — 6360000002 HC RX W HCPCS: Performed by: SURGERY

## 2022-10-19 RX ORDER — SODIUM CHLORIDE, SODIUM LACTATE, POTASSIUM CHLORIDE, CALCIUM CHLORIDE 600; 310; 30; 20 MG/100ML; MG/100ML; MG/100ML; MG/100ML
INJECTION, SOLUTION INTRAVENOUS CONTINUOUS
Status: ACTIVE | OUTPATIENT
Start: 2022-10-19 | End: 2022-10-20

## 2022-10-19 RX ORDER — MAGNESIUM SULFATE IN WATER 40 MG/ML
2000 INJECTION, SOLUTION INTRAVENOUS PRN
Status: DISCONTINUED | OUTPATIENT
Start: 2022-10-19 | End: 2022-10-25 | Stop reason: HOSPADM

## 2022-10-19 RX ORDER — POTASSIUM CHLORIDE 20 MEQ/1
40 TABLET, EXTENDED RELEASE ORAL PRN
Status: DISCONTINUED | OUTPATIENT
Start: 2022-10-19 | End: 2022-10-25 | Stop reason: HOSPADM

## 2022-10-19 RX ORDER — POTASSIUM CHLORIDE 7.45 MG/ML
10 INJECTION INTRAVENOUS PRN
Status: DISCONTINUED | OUTPATIENT
Start: 2022-10-19 | End: 2022-10-25 | Stop reason: HOSPADM

## 2022-10-19 RX ADMIN — SODIUM CHLORIDE 40 MG: 9 INJECTION, SOLUTION INTRAMUSCULAR; INTRAVENOUS; SUBCUTANEOUS at 09:35

## 2022-10-19 RX ADMIN — SODIUM CHLORIDE, PRESERVATIVE FREE 5 ML: 5 INJECTION INTRAVENOUS at 19:51

## 2022-10-19 RX ADMIN — SODIUM CHLORIDE, PRESERVATIVE FREE 10 ML: 5 INJECTION INTRAVENOUS at 09:36

## 2022-10-19 RX ADMIN — SODIUM CHLORIDE, POTASSIUM CHLORIDE, SODIUM LACTATE AND CALCIUM CHLORIDE: 600; 310; 30; 20 INJECTION, SOLUTION INTRAVENOUS at 21:31

## 2022-10-19 RX ADMIN — SODIUM CHLORIDE, POTASSIUM CHLORIDE, SODIUM LACTATE AND CALCIUM CHLORIDE: 600; 310; 30; 20 INJECTION, SOLUTION INTRAVENOUS at 09:54

## 2022-10-19 ASSESSMENT — PAIN SCALES - GENERAL
PAINLEVEL_OUTOF10: 2
PAINLEVEL_OUTOF10: 0
PAINLEVEL_OUTOF10: 2
PAINLEVEL_OUTOF10: 0

## 2022-10-19 ASSESSMENT — PAIN DESCRIPTION - ORIENTATION: ORIENTATION: RIGHT

## 2022-10-19 ASSESSMENT — PAIN SCALES - WONG BAKER: WONGBAKER_NUMERICALRESPONSE: 2

## 2022-10-19 ASSESSMENT — PAIN DESCRIPTION - LOCATION: LOCATION: HIP

## 2022-10-19 NOTE — PROGRESS NOTES
Dual skin assessment completed with Dionna Bunch RN. Patient has  X 2 skin tear to right elbow. Two small Allevyn place. Toe nails to feet is thin and bent. Skin is fragile. Sacral coccyx is red, but blanchable. Allevyn place.

## 2022-10-19 NOTE — PROGRESS NOTES
TRANSFER - IN REPORT:    Verbal report received from [facility] on Liliya Wilson  being received from ED for routine progression of patient care      Report consisted of patient's Situation, Background, Assessment and   Recommendations(SBAR). Information from the following report(s) Nurse Handoff Report, ED Encounter Summary, Adult Overview, Intake/Output, and Cardiac Rhythm NSR  was reviewed with the receiving nurse. Opportunity for questions and clarification was provided. Assessment completed upon patient's arrival to unit and care assumed.

## 2022-10-19 NOTE — CARE COORDINATION
Discharge planning was discussed with the Critical Care Interdisciplinary Team. The patient is pending therapy evaluations, is requiring IV treatments, and is not yet medically ready for discharge.  Discharge planning is pending the patient's clinical course in the hospital.

## 2022-10-19 NOTE — ASSESSMENT & PLAN NOTE
Admission CT with \"stranding densities in the right upper quadrant adjacent to the gallbladder and first and second portion of the duodenum and in Morison's pouch. This tracks inferiorly.  \"  -consult general surgery: concern for duodenitis  10/24/2022: continue conservative treatment

## 2022-10-19 NOTE — CARE COORDINATION
10/19/22 1251   Service Assessment   Patient Orientation Alert and Oriented   Cognition Alert   History Provided By Patient   Primary Caregiver Self   Support Systems Family Members   PCP Verified by CM Yes   Prior Functional Level Independent in ADLs/IADLs   Current Functional Level Independent in ADLs/IADLs   Can patient return to prior living arrangement Unknown at present   Ability to make needs known: Good   Family able to assist with home care needs: Yes   Would you like for me to discuss the discharge plan with any other family members/significant others, and if so, who? Yes  Shane Pires, sister)   Financial Resources Medicare   Community Resources Other (Comment)  (None)   Social/Functional History   Lives With Alone   Type of [place], rolling   ADL Assistance Independent   Discharge Planning   Living Arrangements Alone   Current Services Prior To Admission [address]   DME Ordered? No   Potential Assistance Purchasing Medications No   Patient expects to be discharged to: [facility] Discharge   Transition of Care Consult (CM Consult) Discharge [facility] Discharge [facility] (SNF)   Condition of Participation: Discharge Planning   The Patient and/or Patient Representative was provided with a Choice of Provider? Patient   The Patient and/Or Patient Representative agree with the Discharge Plan? Yes   Freedom of Choice list was provided with basic dialogue that supports the patient's individualized plan of care/goals, treatment preferences, and shares the quality data associated with the providers? Yes     RN CM met with the patient and his sister at the bedside and discussed discharge planning. Short term rehabilitation has been recommended.  They were provided with a list of skilled nursing facilities and their quality metrics and are in agreement for case management to send referrals to the facilities on the list. Referrals were sent and he is pending bed offers. RN CM encouraged them to contact case management if they have any specific facilities in mind. RN CM encouraged them to ask questions. Case Management will continue to follow this patient for discharge planning needs.

## 2022-10-19 NOTE — CONSULTS
[address] [facility]    Name:  Cassie Rossi  MR#:  [id number]  :  1935  ACCOUNT #:  [de-identified]  DATE OF SERVICE:  10/18/2022    He is seen in ER-3 at [facility]:  Fatigue. He was seen alone. His sister apparently does a lot of his history, but she is not available. HISTORY OF PRESENT ILLNESS:  He is an 60-year-old male with a medical history of diabetes type 2, hypertension and hyperkalemia secondary to Bactrim use, presented to the emergency department with generalized fatigue and increased urinary frequency. He states that he was in [facility] [place] [facility] [address] [place] [address] with a fall backwards down on brick stairs about two weeks ago and complains of abdominal pain since then. He relates being seen at a local Urgent Care where he got x-rays. He reports also having significant abrasions about the right elbow and pain in that right elbow. The CT of his head on this admission was negative for acute findings. C-spine negative for acute findings. The CT chest, abdomen and pelvis with distinctive findings for abdominal trauma. He reports also found to have a right inferior pubic ramus fracture, nondisplaced fracture of the anterior wall of the right acetabulum. These appeared to be probably about two weeks' old which would be in line with the patient's previous history of fall. There is early healing. There is no significant displacement. Hospital consultations were obtained with Dr. Xenia Cruz and the hospitalist service. REVIEW OF SYSTEMS:  Negative except as noted in the HPI. PRINCIPAL PROBLEMS:  Abdominal trauma, pelvic fracture and medical issues. IMAGING STUDIES:  On his plain films, I did not visualize the fractures. 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   On his CT scan, he has a closed nondisplaced fracture to the right acetabulum with early healing; inferior pubic ramus fracture, right, closed and nondisplaced. PHYSICAL EXAMINATION:  GENERAL:  On examination, the patient is normocephalic, atraumatic. He answers questions appropriately. He appears lean, but well nourished. VITAL SIGNS:  His blood pressure is 129/58, pulse 79, temperature is 97.8. He is lying down. He answers and is a fairly good historian, but does ramble a bit. HEENT:  Normocephalic and atraumatic in the head. Eyes:  Pupils are equally round. Nares appeared normal with no drainage. NECK:  Full range of motion without restriction. His trachea appears midline. HEART:  Regular rate and rhythm. LUNGS:  Clear. ABDOMEN:  Some tenderness in the right lower quadrant. EXTREMITIES:  He moves both hands well. I inspected his elbows, these are both wrapped in [place]. There is no evidence of erythema or infection. He lacks about 5 degrees of full extension, but otherwise has normal motion and normal  strength bilaterally in the upper extremities. His lower extremity, he says it is painful in the groin when he moves it. He was able to do a straight leg raise of about 6 to 8 inches without difficulty. I was able to range of motion his right hip without significant pain. He has full range of motion of the right knee and ankle area. Full range of motion of the left hip area and left knee. I discussed with him that we would probably let him ambulate with a walker. We will make recommendation with the Physical Therapy and follow this patient as needed and follow him up outpatient as well.         Juan Patel MD      [id number]  D:  10/18/2022 19:15  T:  10/19/2022 0:46  JOB #:  [id number]

## 2022-10-19 NOTE — DISCHARGE INSTRUCTIONS
Avoid NSAIDs (Aleve, Ibuprofen, Advil, Motrin, Feldene, Naprosyn, etc...)  Take Prilosec 20mg daily for one month for acid reduction therapy for suspected duodenitis on CT from 10/18/22  See Gastroenterology if any upper abdominal pain recurs      Follow-up with Endocrinology for an incidental 3.4cm left adrenal mass noted on your CT scan from 10/18/22

## 2022-10-19 NOTE — CARE COORDINATION
RN CM met with the patient, his daughter, and visitor at the bedside and provided him with his bed offers. RN CM encouraged them to review the offers and to contact case management once they have made a decision. The patient was provided with an [employer].

## 2022-10-19 NOTE — CONSULTS
Urology Consult    Patient: Nilson Munoz MRN: [id number]  SSN: [id number]    YOB: 1935  Age: 81 y.o. Sex: male      Subjective:      Nilson Munoz is a 81 y.o. male with past medical hx of DM2, hypertension presents to [facility] ER on 10/18 with generalized fatigue and urinary frequency since having a fall. He suffered a fall x 2 weeks ago where he was in his backyard of his condo and fell backwards down on brick stairs. He has since been to urgent care and xrays taken. In the ER, CT head was negative. CT CAP describes:  Exam limited due to lack of intravenous contrast.    1) Nondisplaced fracture anterior lip right acetabulum and fracture inferior   pubic ramus on the right with small amount of callus formation. 2) Stranding densities in the right upper quadrant, near the neck the   gallbladder, first and second portion of the duodenum and in Morison's pouch. This could be acute inflammation or posttraumatic hemorrhage. 3) Stranding densities tract along the right abdomen down into the spermatic   cord and upper scrotum on the right, inflammatory versus posttraumatic. 4) A 3.4 cm left adrenal mass, neoplasia versus adrenal hemorrhage. 5) Urinary bladder is quite distended. He has since had haines placed with 2L output. Labs today show Cr of 2.03 down from 3.28.  K/Na normal.  Hgb 9.0. WBC 7.6. VSS, afebrile. UA neg for blood or infection. Gen surgery and ortho have been consulted. His fractures appear to have early healing. Per surgery note he is feeling better, denies abd pain. No interventions planned. Past Medical History:   Diagnosis Date    Acute kidney injury ([address] 1/25/2022    Cellulitis of forearm, right 4/21/2022    Diabetes ([address]     Gastroenteritis 9/1/2021    Hyperkalemia 1/25/2022    Hypertension      Past Surgical History:   Procedure Laterality Date    FRACTURE SURGERY        History reviewed. No pertinent family history.   Social History Tobacco Use    Smoking status: Never    Smokeless tobacco: Never   Substance Use Topics    Alcohol use: No      Prior to Admission medications    Medication Sig Start Date End Date Taking? Authorizing Provider   allopurinol (ZYLOPRIM) 100 MG tablet Take 50 mg by mouth daily 5/26/22 5/26/23  Historical Provider, MD   amLODIPine (NORVASC) 5 MG tablet Take 5 mg by mouth daily 5/12/22   Historical Provider, MD   aspirin 81 MG EC tablet Take 81 mg by mouth daily    Historical Provider, MD   atorvastatin (LIPITOR) 20 MG tablet Take 20 mg by mouth 7/28/22 1/24/23  Historical Provider, MD   cyanocobalamin 500 MCG tablet Take by mouth daily    Historical Provider, MD   furosemide (LASIX) 40 MG tablet Take 40 mg by mouth daily 4/29/22 4/29/23  Historical Provider, MD   sodium zirconium cyclosilicate (LOKELMA) 10 g PACK oral suspension Take 10 g by mouth 9/2/22 12/1/22  Historical Provider, MD   SITagliptin (JANUVIA) 100 MG tablet Take 100 mg by mouth daily 6/20/22   Historical Provider, MD   metFORMIN (GLUCOPHAGE) 500 MG tablet Take by mouth 2 times daily (with meals)    Ar Automatic Reconciliation        No Known Allergies    Review of Systems:  As stated in H&P    Objective:     Vitals:    10/19/22 0600 10/19/22 0700 10/19/22 0800 10/19/22 0815   BP: (!) 120/50 (!) 115/53 (!) 146/86    Pulse: 75 76 86 81   Resp:  18 13    Temp:  97.2 °F (36.2 °C)     TempSrc:       SpO2: 99% 100%  100%   Weight:       Height:              Assessment:     82 yo male with a fall x 2 weeks ago who presents to [facility] with fatigue and urinary frequency. CT with several findings including distended bladder and stranding densities tract along the right abdomen down into the spermatic cord and upper scrotum on the right, inflammatory versus posttraumatic. He has since had haines placed with 2L output. Labs today show Cr of 2.03 down from 3.28.  K/Na normal.  Hgb 9.0. WBC 7.6. VSS, afebrile. UA neg for blood or infection.        Gen surgery and ortho have been consulted. His fractures appear to have early healing. Per surgery note he is feeling better, denies abd pain. No interventions planned. Plan: Will ask on call MD to review scans. Thank you for the opportunity to assist in the care of this patient. Addendum:  Dr. Baeza reviewed scans with no emergent urological findings. Pt seen/examined this afternoon. He has no penile/scrotal swelling or tenderness, he is uncircumcised and haines is in place with clear yellow urine. He has no complaints. Gen- alert, pleasant, hard of hearing  Abd- soft, non tender  Lungs- easy work of breathing  Neuro- alert and oriented x 3  MALE GENITAL EXAM:  Penis: uncircumcised  Testicles: normal, no edema or tenderness  Scrotum: normal, no edema or tenderness          Would leave haines catheter in place 3-5 days and remove at rehab facility for voiding trial. He can follow up PRN.      Signed By: Melita Levine APRN - CNP     October 19, 2022

## 2022-10-19 NOTE — PROGRESS NOTES
ACUTE PHYSICAL THERAPY GOALS:   (Developed with and agreed upon by patient and/or caregiver.)  DISCHARGE GOALS :  (1.)Mr. Renu Arreguin will move from supine to sit and sit to supine  with STAND BY ASSIST.   (2.)Mr. Renu Arreguin will transfer from bed to chair and chair to bed with STAND BY ASSIST using a walker. (3.)Mr. Renu Arreguin will ambulate with STAND BY ASSIST for 300 feet with a rolling walker. 4) pt able to go up & down 5 steps with rail & CGA, device PRN. _____________________________     PHYSICAL THERAPY Initial Assessment and AM  (Link to Caseload Tracking: PT Visit Days : 1  Acknowledge Orders  Time In/Out  PT Charge Capture  Rehab Caseload Tracker    Wilder Cedillo is a 81 y.o. male   PRIMARY DIAGNOSIS: Sepsis with acute renal failure and tubular necrosis (HCC)  Generalized weakness [R53.1]  Closed nondisplaced fracture of anterior wall of right acetabulum, initial encounter ([address] [S32.414A]  Abdominal trauma, initial encounter [S39.91XA]  Inferior pubic ramus fracture, right, closed, initial encounter ([address] [S32.591A]  Acute renal failure, unspecified acute renal failure type ([address] [N17.9]       Reason for Referral: Generalized Muscle Weakness (M62.81)  Other lack of cordination (R27.8)  Difficulty in walking, Not elsewhere classified (R26.2)  History of falling (Z91.81)  Inpatient: Payor: Allison N[place]otte / Plan: [employer] / Product Type: *No Product type* /     ASSESSMENT:     REHAB RECOMMENDATIONS:   Recommendation to date pending progress:  Setting:  Short-term Rehab    Equipment:    To Be Determined     ASSESSMENT:  Mr. Renu Arreguin presents in no distress, pleasant & cooperative, participated very well with therapy. This pt is much more limited & functioning below his reported baseline since his right acetabulum & inferior pubic rami Fx due to a fall at home.  This pt is completely unsafe to return home alone, but could benefit from aggressive in-pt post acute therapy at a SNF on address:)  Decreased Activity Tolerance  Decreased Balance  Decreased Coordination  Decreased Gait Ability  Decreased Safety Awareness  Decreased Strength  Decreased Transfer Abilities  Increased Pain INTERVENTIONS PLANNED:   (Benefits and precautions of physical therapy have been discussed with the patient.)  Therapeutic Activity  Therapeutic Exercise/HEP  Gait Training  Education       TREATMENT:   EVALUATION: HIGH COMPLEXITY: (Untimed Charge)    TREATMENT:   Therapeutic Activity (24 Minutes): Therapeutic activity included reviewed PT role, POC & PT expectations, repeated sit<>stand, sitting EOB with wt shifting, standing with walker & wt shifting as a warm up to gait, progressive gait training an additonal 40 ft after an initial 20 ft gait assessment to improve functional Activity tolerance, Balance, Coordination, Mobility, Strength, and ROM. TREATMENT GRID:  N/A    AFTER TREATMENT PRECAUTIONS: Bed/Chair Locked, Call light within reach, Chair, Needs within reach, and RN notified    INTERDISCIPLINARY COLLABORATION:  MD/ PA/ NP , RN/ PCT, and OT/ CELIS    EDUCATION: Education Given To: Patient  Education Provided: Role of Therapy;Home Exercise Program;Precautions;Transfer Training;IADL Safety;Equipment; Fall Prevention Strategies  Education Method: Demonstration;Verbal;Teach Back  Education Outcome: Continued education needed    TIME IN/OUT:  Time In: 2466  Time Out: [address]  Minutes: [address]  Bhavya Cheese

## 2022-10-19 NOTE — PLAN OF CARE
Problem: Discharge Planning  Goal: Discharge to home or other facility with appropriate resources  10/19/2022 0031 by Babs Ferrara RN  Outcome: Progressing  10/19/2022 0013 by Babs Ferrara RN  Outcome: Progressing  Flowsheets  Taken 10/18/2022 2216 by Alexandra Jennings RN  Discharge to home or other facility with appropriate resources:   Identify barriers to discharge with patient and caregiver   Identify discharge learning needs (meds, wound care, etc)   Refer to discharge planning if patient needs post-hospital services based on physician order or complex needs related to functional status, cognitive ability or social support system   Arrange for needed discharge resources and transportation as appropriate   Arrange for interpreters to assist at discharge as needed  Taken 10/18/2022 2115 by Babs Ferrara RN  Discharge to home or other facility with appropriate resources:   Identify barriers to discharge with patient and caregiver   Arrange for needed discharge resources and transportation as appropriate   Identify discharge learning needs (meds, wound care, etc)   Arrange for interpreters to assist at discharge as needed   Refer to discharge planning if patient needs post-hospital services based on physician order or complex needs related to functional status, cognitive ability or social support system     Problem: Pain  Goal: Verbalizes/displays adequate comfort level or baseline comfort level  10/19/2022 0031 by Babs Ferrara RN  Outcome: Progressing  10/19/2022 0013 by Babs Ferrara RN  Outcome: Progressing  Flowsheets (Taken 10/18/2022 2115)  Verbalizes/displays adequate comfort level or baseline comfort level:   Encourage patient to monitor pain and request assistance   Assess pain using appropriate pain scale   Administer analgesics based on type and severity of pain and evaluate response   Implement non-pharmacological measures as appropriate and evaluate response   Consider cultural and social influences on pain and pain management   Notify Licensed Independent Practitioner if interventions unsuccessful or patient reports new pain     Problem: Safety - Adult  Goal: Free from fall injury  10/19/2022 0031 by Maury Manuel RN  Outcome: Progressing  10/19/2022 0013 by Maury Manuel RN  Outcome: Progressing     Problem: ABCDS Injury Assessment  Goal: Absence of physical injury  10/19/2022 0031 by Maury Manuel RN  Outcome: Progressing  10/19/2022 0013 by Maury Manuel RN  Outcome: Progressing     Problem: Skin/Tissue Integrity  Goal: Absence of new skin breakdown  Description: 1. Monitor for areas of redness and/or skin breakdown  2. Assess vascular access sites hourly  3. Every 4-6 hours minimum:  Change oxygen saturation probe site  4. Every 4-6 hours:  If on nasal continuous positive airway pressure, respiratory therapy assess nares and determine need for appliance change or resting period.   10/19/2022 0031 by Maury Manuel RN  Outcome: Progressing  10/19/2022 0013 by Maury Manuel RN  Outcome: Progressing

## 2022-10-19 NOTE — CONSULTS
[id number] [facility]    Name:  Jani Ortiz  MR#:  [id number]  :  1935  ACCOUNT #:  [de-identified]  DATE OF SERVICE:  10/18/2022    He is seen in ER-3 at [facility]:  Fatigue. He was seen alone. His sister apparently does a lot of his history, but she is not available. HISTORY OF PRESENT ILLNESS:  He is an 81-year-old male with a medical history of diabetes type 2, hypertension and hyperkalemia secondary to Bactrim use, presented to the emergency department with generalized fatigue and increased urinary frequency. He states that he [facility] [place] [facility] [address] [place] [address] with a fall backwards down on brick stairs about two weeks ago and complains of abdominal pain since then. He relates being seen at a local Urgent Care where he got x-rays. He reports also having significant abrasions about the right elbow and pain in that right elbow. The CT of his head on this admission was negative for acute findings. C-spine negative for acute findings. The CT chest, abdomen and pelvis with distinctive findings for abdominal trauma. He reports also found to have a right inferior pubic ramus fracture, nondisplaced fracture of the anterior wall of the right acetabulum. These appeared to be probably about two weeks' old which would be in line with the patient's previous history of fall. There is early healing. There is no significant displacement. Hospital consultations were obtained with Dr. Tony Monge and the hospitalist service. REVIEW OF SYSTEMS:  Negative except as noted in the HPI. PRINCIPAL PROBLEMS:  Abdominal trauma, pelvic fracture and medical issues. IMAGING STUDIES:  On his plain films, I did not visualize the fractures. 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   On his CT scan, he has a closed nondisplaced fracture to the right acetabulum with early healing; inferior pubic ramus fracture, right, closed and nondisplaced. PHYSICAL EXAMINATION:  GENERAL:  On examination, the patient is normocephalic, atraumatic. He answers questions appropriately. He appears lean, but well nourished. VITAL SIGNS:  His blood pressure is 129/58, pulse 79, temperature is 97.8. He is lying down. He answers and is a fairly good historian, but does ramble a bit. HEENT:  Normocephalic and atraumatic in the head. Eyes:  Pupils are equally round. Nares appeared normal with no drainage. NECK:  Full range of motion without restriction. His trachea appears midline. HEART:  Regular rate and rhythm. LUNGS:  Clear. ABDOMEN:  Some tenderness in the right lower quadrant. EXTREMITIES:  He moves both hands well. I inspected his elbows, these are both wrapped in Coban. There is no evidence of erythema or infection. He lacks about 5 degrees of full extension, but otherwise has normal motion and normal  strength bilaterally in the upper extremities. His lower extremity, he says it is painful in the groin when he moves it. He was able to do a straight leg raise of about 6 to 8 inches without difficulty. I was able to range of motion his right hip without significant pain. He has full range of motion of the right knee and ankle area. Full range of motion of the left hip area and left knee. I discussed with him that we would probably let him ambulate with a walker. We will make recommendation with the Physical Therapy and follow this patient as needed and follow him up outpatient as well.         Elysia Patel MD      [id number]  D:  10/18/2022 19:15  T:  10/19/2022 0:46  JOB #:  [id number]

## 2022-10-19 NOTE — PROGRESS NOTES
Hospitalist Progress Note   Admit Date:  10/18/2022 12:14 PM   Name:  Benita Andres   Age:  81 y.o. Sex:  male  :  1935   MRN:  [id number]   Room:  [facility]/    Presenting Complaint: Fatigue     Reason(s) for Admission: Generalized weakness [R53.1]  Closed nondisplaced fracture of anterior wall of right acetabulum, initial encounter ([facility] [address] [S32.414A]  Abdominal trauma, initial encounter [S39.91XA]  Inferior pubic ramus fracture, right, closed, initial encounter ([facility] [address] [S32.591A]  Acute renal failure, unspecified acute renal failure type [facility]) [N17.9]     Hospital Course:    81 y.o. male with medical history of  DM II, HTN, hyperkalemia secondary to bactrim use who presented with c/o generalized fatigue and increased urinary frequency. Pt reports a fall backwards down his brick stairs about 2 weeks ago and c/o abd pain since. Started having urinary incontinence last night. CT head neg for acute findings. CT c-spine neg for acute findings. CT chest/abd/pelvis with extensive findings of abdominal trauma (see full report below). Also found to have an right inferior pubic ramus fx and nondisplaced fx of the anterior wall of the right acetabulum. Ortho surgery (Dr. To Ac) consulted per ER. General Surgery (Dr. Felipe Ly) contacted per ER. BC sent. Also with TIGIST with creatinine at 3.28 baseline around 1.3. Pt hemodynamically stable. Denies CP, SOB, n/v/d. Subjective & 24hr Events (10/19/22): In good spirits today. Working with PT, OT. Going to need rehab. Surgeons all seem to be recommending medical management. Sepsis improving. Renal function improving, not at baseline. Pain controlled. Assessment & Plan:   * Sepsis with acute renal failure and tubular necrosis (HCC)  Assessment & Plan  Admission criteria met with HR 96, RR 41, ruq CT findings.   -empiric antibiotics (pip-tazo)  -follow cultures  10/19/2022: cultures NGTD      Gout  Assessment & Plan  -hold allopurinol    Acute renal failure with acute tubular necrosis superimposed on stage 3a chronic kidney disease ([facility] [address]  Assessment & Plan  Responding appropriately to resuscitation.  -serial BMP  -IVF  -avoid nephrotoxic substances    Abnormal CT abd with RUQ inflammation  Assessment & Plan  Admission CT with \"stranding densities in the right upper quadrant adjacent to the gallbladder and first and second portion of the duodenum and in Morison's pouch. This tracks inferiorly. \"  -consult general surgery: concern for duodenitis  10/19/2022: continue conservative treatment    Chronic kidney disease, stage 3a ([facility] [address]  Assessment & Plan  Noted  -avoid nephrotoxic substances    Skin tear of elbow without complication  Assessment & Plan  -wound consult    Diabetes mellitus type 2, controlled ([facility] [address]  Assessment & Plan  -hold metformin, sitagliptin  -sliding scale insulin    3.4cm left adrenal mass noted incidentally on CT 10/18/22  Assessment & Plan  -outpatient follow up with urology    Hyperlipidemia  Assessment & Plan  -hold atorvastatin    Urinary retention  Assessment & Plan  -haines    Generalized weakness  Assessment & Plan  -PT, OT, PPD    Fall  Assessment & Plan  -fall precuations    Closed nondisplaced fracture of anterior wall of right acetabulum [facility])  Assessment & Plan  -consult orthopedic surgery: conservative managment    Primary hypertension  Assessment & Plan  -amlodipine, furosemide            Anticipated discharge needs:      Short term rehab    Diet:  ADULT DIET;  Regular; 4 carb choices (60 gm/meal)  DVT PPx: SCDs  Code status: Full Code    Hospital Problems:  Principal Problem:    Sepsis with acute renal failure and tubular necrosis (HCC)  Active Problems:    Abnormal CT abd with RUQ inflammation    Closed nondisplaced fracture of anterior wall of right acetabulum (HCC)    Inferior pubic ramus fracture, right, closed, initial encounter ([facility] [address]    Acute renal failure with acute tubular necrosis superimposed on stage 3a chronic kidney disease [facility])    Fall    Contusion of abdominal wall    Generalized weakness    Closed nondisplaced fracture of anterior wall of right acetabulum with delayed healing    Urinary retention    Gout    3.4cm left adrenal mass noted incidentally on CT 10/18/22    Diabetes mellitus type 2, controlled ([facility] [address]    Skin tear of elbow without complication    Chronic kidney disease, stage 3a ([facility] [address]    Normocytic anemia    Primary hypertension    Hyperlipidemia  Resolved Problems:    * No resolved hospital problems.  *      Objective:   Patient Vitals for the past 24 hrs:   Temp Pulse Resp BP SpO2   10/19/22 1300 -- 89 19 125/60 98 %   10/19/22 1200 -- 89 17 135/62 98 %   10/19/22 1100 97.3 °F (36.3 °C) 94 19 126/60 98 %   10/19/22 1043 -- (!) 104 20 -- 97 %   10/19/22 1015 -- (!) 103 18 -- 98 %   10/19/22 0900 -- 91 12 135/74 --   10/19/22 0815 -- 81 -- -- 100 %   10/19/22 0800 -- 86 13 (!) 146/86 --   10/19/22 0700 97.2 °F (36.2 °C) 76 18 (!) 115/53 100 %   10/19/22 0600 -- 75 -- (!) 120/50 99 %   10/19/22 0500 -- 78 -- (!) 116/51 99 %   10/19/22 0400 98.4 °F (36.9 °C) 75 18 (!) 114/56 99 %   10/19/22 0300 -- 85 19 (!) 116/59 98 %   10/19/22 0200 -- 78 17 (!) 117/58 98 %   10/19/22 0100 -- 73 16 (!) 118/56 99 %   10/19/22 0000 98.4 °F (36.9 °C) 75 14 (!) 118/58 97 %   10/18/22 2300 -- 76 16 (!) 121/59 99 %   10/18/22 2200 -- 88 15 (!) 141/67 98 %   10/18/22 2120 97.9 °F (36.6 °C) -- -- -- --   10/18/22 2115 97.9 °F (36.6 °C) 94 20 (!) 142/71 97 %   10/18/22 2028 -- 87 20 (!) 151/66 97 %   10/18/22 2025 -- 81 20 -- 98 %   10/18/22 1959 -- 85 17 128/64 98 %   10/18/22 1859 -- 86 (!) 41 137/63 --   10/18/22 1759 -- 80 26 (!) 118/56 --   10/18/22 1728 -- 93 19 135/63 --   10/18/22 1628 -- 76 19 (!) 117/59 --       Oxygen Therapy  SpO2: 98 %  Pulse via Oximetry: 88 beats per minute  Pulse Oximeter Device Mode: Continuous  O2 Device: Nasal cannula  Skin Assessment: Clean, dry, & intact  O2 Flow Rate (L/min): 2 L/min (Gradually weaned to room air.)    Estimated body mass index is 18.73 kg/m² as calculated from the following:    Height as of this encounter: 6' 1\" (1.854 m). Weight as of this encounter: 142 lb (64.4 kg). Intake/Output Summary (Last 24 hours) at 10/19/2022 1608  Last data filed at 10/19/2022 0936  Gross per 24 hour   Intake 545 ml   Output 2675 ml   Net -2130 ml       Blood pressure 125/60, pulse 89, temperature 97.3 °F (36.3 °C), temperature source Temporal, resp. rate 19, height 6' 1\" (1.854 m), weight 142 lb (64.4 kg), SpO2 98 %. Physical Exam  Vitals and nursing note reviewed. Constitutional:       General: He is not in acute distress. Appearance: He is underweight. He is ill-appearing. He is not diaphoretic. HENT:      Head: Normocephalic and atraumatic. Eyes:      Extraocular Movements: Extraocular movements intact. Cardiovascular:      Rate and Rhythm: Normal rate. Pulmonary:      Effort: Pulmonary effort is normal. No respiratory distress. Abdominal:      General: There is no distension. Musculoskeletal:         General: No deformity. Skin:     Coloration: Skin is not jaundiced or pale. Neurological:      General: No focal deficit present. Mental Status: He is alert and oriented to person, place, and time. Mental status is at baseline. He is confused. Comments: Has contextual memory limitations   Psychiatric:         Mood and Affect: Mood normal.         Behavior: Behavior normal. Behavior is cooperative. Cognition and Memory: Cognition is impaired.       Comments: pleasant         I have personally reviewed labs and tests showing:  Recent Labs:  Recent Results (from the past 48 hour(s))   EKG 12 Lead    Collection Time: 10/18/22 12:25 PM   Result Value Ref Range    Ventricular Rate 97 BPM    Atrial Rate 97 BPM    P-R Interval 146 ms    QRS Duration 86 ms    Q-T Interval 352 ms    QTc Calculation (Bazett) 447 ms    P Axis 72 degrees    R Axis 55 degrees    T Axis 64 degrees    Diagnosis Normal sinus rhythm    Culture, Blood 1    Collection Time: 10/18/22 12:29 PM    Specimen: Blood   Result Value Ref Range    Special Requests RIGHT  Antecubital        Culture NO GROWTH AFTER 19 HOURS     Lactic Acid    Collection Time: 10/18/22 12:29 PM   Result Value Ref Range    Lactic Acid, Plasma 1.5 0.4 - 2.0 MMOL/L   CBC with Auto Differential    Collection Time: 10/18/22 12:29 PM   Result Value Ref Range    WBC 10.4 4.3 - 11.1 K/uL    RBC 3.29 (L) 4.23 - 5.6 M/uL    Hemoglobin 10.0 (L) 13.6 - 17.2 g/dL    Hematocrit 31.9 (L) 41.1 - 50.3 %    MCV 97.0 82.0 - 102.0 FL    MCH 30.4 26.1 - 32.9 PG    MCHC 31.3 (L) 31.4 - 35.0 g/dL    RDW 15.9 (H) 11.9 - 14.6 %    Platelets 606 695 - 709 K/uL    MPV 10.7 9.4 - 12.3 FL    nRBC 0.00 0.0 - 0.2 K/uL    Differential Type AUTOMATED      Seg Neutrophils 84 (H) 43 - 78 %    Lymphocytes 5 (L) 13 - 44 %    Monocytes 6 4.0 - 12.0 %    Eosinophils % 1 0.5 - 7.8 %    Basophils 1 0.0 - 2.0 %    Immature Granulocytes 3 0.0 - 5.0 %    Segs Absolute 8.8 (H) 1.7 - 8.2 K/UL    Absolute Lymph # 0.5 0.5 - 4.6 K/UL    Absolute Mono # 0.7 0.1 - 1.3 K/UL    Absolute Eos # 0.1 0.0 - 0.8 K/UL    Basophils Absolute 0.1 0.0 - 0.2 K/UL    Absolute Immature Granulocyte 0.3 0.0 - 0.5 K/UL   CMP    Collection Time: 10/18/22 12:29 PM   Result Value Ref Range    Sodium 140 133 - 143 mmol/L    Potassium 4.9 3.5 - 5.1 mmol/L    Chloride 109 101 - 110 mmol/L    CO2 24 21 - 32 mmol/L    Anion Gap 7 2 - 11 mmol/L    Glucose 272 (H) 65 - 100 mg/dL    BUN 80 (H) 8 - 23 MG/DL    Creatinine 3.28 (H) 0.8 - 1.5 MG/DL    Est, Glom Filt Rate 18 (L) >60 ml/min/1.73m2    Calcium 9.2 8.3 - 10.4 MG/DL    Total Bilirubin 0.7 0.2 - 1.1 MG/DL    ALT 18 12 - 65 U/L    AST 16 15 - 37 U/L    Alk Phosphatase 91 50 - 136 U/L    Total Protein 7.1 6.3 - 8.2 g/dL    Albumin 3.3 3.2 - 4.6 g/dL    Globulin 3.8 2.8 - 4.5 g/dL    Albumin/Globulin Ratio 0.9 0.4 - 1.6     Procalcitonin    Collection Time: 10/18/22 12:29 PM Result Value Ref Range    Procalcitonin 0.09 0.00 - 0.49 ng/mL   Culture, Blood 1    Collection Time: 10/18/22 12:30 PM    Specimen: Blood   Result Value Ref Range    Special Requests LEFT ANTECUBITAL      Culture NO GROWTH AFTER 19 HOURS     Urinalysis    Collection Time: 10/19/22 12:47 AM   Result Value Ref Range    Color, UA YELLOW/STRAW      Appearance CLEAR      Specific Gravity, UA 1.017 1.001 - 1.023      pH, Urine 5.0 5.0 - 9.0      Protein, UA Negative NEG mg/dL    Glucose, UA Negative mg/dL    Ketones, Urine Negative NEG mg/dL    Bilirubin Urine Negative NEG      Blood, Urine Negative NEG      Urobilinogen, Urine 0.2 0.2 - 1.0 EU/dL    Nitrite, Urine Negative NEG      Leukocyte Esterase, Urine Negative NEG     CBC with Auto Differential    Collection Time: 10/19/22  3:05 AM   Result Value Ref Range    WBC 7.6 4.3 - 11.1 K/uL    RBC 2.89 (L) 4.23 - 5.6 M/uL    Hemoglobin 9.0 (L) 13.6 - 17.2 g/dL    Hematocrit 28.2 (L) 41.1 - 50.3 %    MCV 97.6 82.0 - 102.0 FL    MCH 31.1 26.1 - 32.9 PG    MCHC 31.9 31.4 - 35.0 g/dL    RDW 15.9 (H) 11.9 - 14.6 %    Platelets 624 813 - 440 K/uL    MPV 11.0 9.4 - 12.3 FL    nRBC 0.00 0.0 - 0.2 K/uL    Differential Type AUTOMATED      Seg Neutrophils 76 43 - 78 %    Lymphocytes 9 (L) 13 - 44 %    Monocytes 8 4.0 - 12.0 %    Eosinophils % 5 0.5 - 7.8 %    Basophils 1 0.0 - 2.0 %    Immature Granulocytes 2 0.0 - 5.0 %    Segs Absolute 5.8 1.7 - 8.2 K/UL    Absolute Lymph # 0.7 0.5 - 4.6 K/UL    Absolute Mono # 0.6 0.1 - 1.3 K/UL    Absolute Eos # 0.3 0.0 - 0.8 K/UL    Basophils Absolute 0.1 0.0 - 0.2 K/UL    Absolute Immature Granulocyte 0.2 0.0 - 0.5 K/UL   Comprehensive Metabolic Panel    Collection Time: 10/19/22  3:05 AM   Result Value Ref Range    Sodium 141 133 - 143 mmol/L    Potassium 4.7 3.5 - 5.1 mmol/L    Chloride 112 (H) 101 - 110 mmol/L    CO2 23 21 - 32 mmol/L    Anion Gap 6 2 - 11 mmol/L    Glucose 160 (H) 65 - 100 mg/dL    BUN 61 (H) 8 - 23 MG/DL    Creatinine 2.03 (H) 0.8 - 1.5 MG/DL    Est, Glom Filt Rate 31 (L) >60 ml/min/1.73m2    Calcium 8.2 (L) 8.3 - 10.4 MG/DL    Total Bilirubin 0.4 0.2 - 1.1 MG/DL    ALT 16 12 - 65 U/L    AST 17 15 - 37 U/L    Alk Phosphatase 79 50 - 136 U/L    Total Protein 5.5 (L) 6.3 - 8.2 g/dL    Albumin 2.7 (L) 3.2 - 4.6 g/dL    Globulin 2.8 2.8 - 4.5 g/dL    Albumin/Globulin Ratio 1.0 0.4 - 1.6     CK    Collection Time: 10/19/22  3:05 AM   Result Value Ref Range    Total  21 - 215 U/L   Procalcitonin    Collection Time: 10/19/22  3:05 AM   Result Value Ref Range    Procalcitonin 0.08 0.00 - 0.49 ng/mL   Hemoglobin and Hematocrit    Collection Time: 10/19/22  1:55 PM   Result Value Ref Range    Hemoglobin 10.0 (L) 13.6 - 17.2 g/dL    Hematocrit 31.9 (L) 41.1 - 50.3 %       I have personally reviewed imaging studies showing: Other Studies:  CT Head W/O Contrast   Final Result   Negative for acute intracranial abnormality. Chronic changes. CT CSpine W/O Contrast   Final Result   1) Negative for acute cervical spine fracture. 2) Facet arthropathy and spondylosis and carotid atherosclerosis. 3) Chronic bronchiectasis and peribronchial thickening and chronic inflammatory   change right lung apex. CT CHEST ABDOMEN PELVIS WO CONTRAST Additional Contrast? None   Final Result   Exam limited due to lack of intravenous contrast.    1) Nondisplaced fracture anterior lip right acetabulum and fracture inferior   pubic ramus on the right with small amount of callus formation. 2) Stranding densities in the right upper quadrant, near the neck the   gallbladder, first and second portion of the duodenum and in Morison's pouch. This could be acute inflammation or posttraumatic hemorrhage. 3) Stranding densities tract along the right abdomen down into the spermatic   cord and upper scrotum on the right, inflammatory versus posttraumatic. 4) A 3.4 cm left adrenal mass, neoplasia versus adrenal hemorrhage.    5) Urinary bladder is quite distended. XR ELBOW RIGHT (MIN 3 VIEWS)   Final Result   Negative for acute fracture. Osteoarthritis.              Current Meds:  Current Facility-Administered Medications   Medication Dose Route Frequency    lactated ringers infusion   IntraVENous Continuous    potassium chloride (KLOR-CON M) extended release tablet 40 mEq  40 mEq Oral PRN    Or    potassium bicarb-citric acid (EFFER-K) effervescent tablet 40 mEq  40 mEq Oral PRN    Or    potassium chloride 10 mEq/100 mL IVPB (Peripheral Line)  10 mEq IntraVENous PRN    magnesium sulfate 2000 mg in 50 mL IVPB premix  2,000 mg IntraVENous PRN    sodium phosphate 10 mmol in sodium chloride 0.9 % 250 mL IVPB  10 mmol IntraVENous PRN    Or    sodium phosphate 15 mmol in sodium chloride 0.9 % 250 mL IVPB  15 mmol IntraVENous PRN    Or    sodium phosphate 20 mmol in sodium chloride 0.9 % 500 mL IVPB  20 mmol IntraVENous PRN    pantoprazole (PROTONIX) 40 mg in sodium chloride (PF) 10 mL injection  40 mg IntraVENous Daily    sodium chloride flush 0.9 % injection 5-40 mL  5-40 mL IntraVENous 2 times per day    sodium chloride flush 0.9 % injection 5-40 mL  5-40 mL IntraVENous PRN    0.9 % sodium chloride infusion   IntraVENous PRN    ondansetron (ZOFRAN-ODT) disintegrating tablet 4 mg  4 mg Oral Q8H PRN    Or    ondansetron (ZOFRAN) injection 4 mg  4 mg IntraVENous Q6H PRN    acetaminophen (TYLENOL) tablet 650 mg  650 mg Oral Q6H PRN    Or    acetaminophen (TYLENOL) suppository 650 mg  650 mg Rectal Q6H PRN    tuberculin injection 5 Units  5 Units IntraDERmal Once       Signed:  Rosy Stafford MD

## 2022-10-19 NOTE — PROGRESS NOTES
ACUTE OCCUPATIONAL THERAPY GOALS:   (Developed with and agreed upon by patient and/or caregiver.)  1. Patient will perform grooming with supervision. 2. Patient will perform upper body dressing with supervision. 3. Patient will perform lower body dressing with min assist.  4. Patient will perform bathing with min assist.  5. Patient will perform toileting and toilet transfer with min assist.  6. Patient will perform ADL functional mobility and tranfers in room with CGA  7. Patient/family to demonstrate knowledge of home safety and DME recommendations. Goals to be achieved in 7 days. OCCUPATIONAL THERAPY Initial Assessment and PM       OT Visit Days: 1  Acknowledge Orders  Time  OT Charge Capture  Rehab Caseload Tracker      Jillian Coffey is a 81 y.o. male   PRIMARY DIAGNOSIS: Sepsis with acute renal failure and tubular necrosis (HCC)  Generalized weakness [R53.1]  Closed nondisplaced fracture of anterior wall of right acetabulum, initial encounter ([facility] [address] [S32.414A]  Abdominal trauma, initial encounter [S39.91XA]  Inferior pubic ramus fracture, right, closed, initial encounter ([facility] [address] [S32.591A]  Acute renal failure, unspecified acute renal failure type ([address] [N17.9]       Reason for Referral: Pain in Right Hip (M25.551)  Stiffness of Right Hip, Not elsewhere classified (M25.651)  Generalized Muscle Weakness (M62.81)  Other lack of cordination (R27.8)  Difficulty in walking, Not elsewhere classified (R26.2)  Other abnormalities of gait and mobility (R26.89)  History of falling (Z91.81)  Inpatient: Payor: Derek Leblanc / Plan: [employer] G[employer] PLUS HMO / Product Type: *No Product type* /     ASSESSMENT:     REHAB RECOMMENDATIONS:   Recommendation to date pending progress:  Setting:  Short-term Rehab    Equipment:    To Be Determined     ASSESSMENT:  Mr. Felix Ortiz is in ICU sitting up in recliner with his daughter julianne. Patient had a fall a few weeks ago. Per nursing WBAT with Harish Camacho.  He has a Closed nondisplaced fracture of anterior wall of right acetabulum,   Inferior pubic ramus fracture, right, closed. 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 He is min assist sit to stand and min for mobility with RW. He has a catheter and he is min for UE and LE ADLs. He would benefit from skilled OT and STR. Will follow.      [address] [place] [address] [address] AM-PAC 6 Clicks Daily Activity Inpatient Short Form:    AM-PAC Daily Activity Inpatient   How much help for putting on and taking off regular lower body clothing?: A Little  How much help for Bathing?: A Little  How much help for Toileting?: Total  How much help for putting on and taking off regular upper body clothing?: A Little  How much help for taking care of personal grooming?: None  How much help for eating meals?: None  AM-[facility] Inpatient Daily Activity Raw Score: 18  AM-PAC Inpatient ADL T-Scale Score : 38.66  ADL Inpatient CMS 0-100% Score: 46.65  ADL Inpatient CMS G-Code Modifier : CK           SUBJECTIVE:     Mr. Hannah Centeno stated he lives alone and works at [employer] still    Social/Functional Lives With: Alone  Type of Home: [address] [facility] [place]: One level  Home Access: Stairs to enter with rails  Entrance Stairs - Number of Steps: 5  Entrance Stairs - Rails: Left  Home Equipment: Sandra Bitter, rolling  ADL Assistance: Independent  Ambulation Assistance: Independent (with cane)  Transfer Assistance: Independent    OBJECTIVE:     Tj Slater / Love Gillis / Nadine Lara: Aguilar Catheter, IV, and ICU monitors    RESTRICTIONS/PRECAUTIONS:  Restrictions/Precautions: Fall Risk, Weight Bearing  Right Lower Extremity Weight Bearing: Weight Bearing As Tolerated with Walker    PAIN: VITALS / O2:   Pre Treatment:      0/10    Post Treatment: 0/10       Vitals          Oxygen            GROSS EVALUATION: INTACT IMPAIRED   (See Comments)   UE AROM [x] []B hands with arthritic deformities   UE PROM [] []   Strength []  Generally decreased     Posture / Balance []  Min in standing unsteady   Sensation []     Coordination []    Decreased sitting and standing   Tone []       Edema []    Activity Tolerance []  Fair with sit to stand     Hand Dominance R [] L [x]      COGNITION/  PERCEPTION: INTACT IMPAIRED   (See Comments)   Orientation []  Did not state he had any fractures   Vision [x]  glasses   Hearing [x]     Cognition  [x]  Follows simple commands   Perception [x]       MOBILITY: I Mod I S SBA CGA Min Mod Max Total  NT x2 Comments:   Bed Mobility    Rolling [] [] [] [] [] [] [] [] [] [x] []    Supine to Sit [] [] [] [] [] [] [] [] [] [x] []    Scooting [] [] [] [] [] [] [] [] [] [x] []    Sit to Supine [] [] [] [] [] [] [] [] [] [x] []    Transfers    Sit to Stand [] [] [] [] [] [x] [] [] [] [] []    Bed to Chair [] [] [] [] [] [] [] [] [] [] []    Stand to Sit [] [] [] [] [] [x] [] [] [] [] []    Tub/Shower [] [] [] [] [] [] [] [] [] [] []     Toilet [] [] [] [] [] [] [] [] [] [] []      [] [] [] [] [] [] [] [] [] [] []    I=Independent, Mod I=Modified Independent, S=Supervision/Setup, SBA=Standby Assistance, CGA=Contact Guard Assistance, Min=Minimal Assistance, Mod=Moderate Assistance, Max=Maximal Assistance, Total=Total Assistance, NT=Not Tested    ACTIVITIES OF DAILY LIVING: I Mod I S SBA CGA Min Mod Max Total NT Comments   BASIC ADLs:              Upper Body Bathing  [] [] [] [] [] [] [] [] [] []    Lower Body Bathing [] [] [] [] [] [] [] [] [] []    Toileting [] [] [] [] [] [] [] [] [x] [] Catheter   Upper Body Dressing [] [] [] [] [] [x] [] [] [] []    Lower Body Dressing [] [] [] [] [] [x] [] [] [] []    Feeding [] [] [] [] [] [] [] [] [] []    Grooming [] [] [] [] [] [] [] [] [] []    Personal Device Care [] [] [] [] [] [] [] [] [] []    Functional Mobility [] [] [] [] [] [x] [] [] [] []    I=Independent, Mod I=Modified Independent, S=Supervision/Setup, SBA=Standby Assistance, CGA=Contact Guard Assistance, Min=Minimal Assistance, Mod=Moderate Assistance, Max=Maximal Assistance, Total=Total Assistance, NT=Not Tested    PLAN:   FREQUENCY/DURATION   OT Plan of Care: 3 times/week for duration of hospital stay or until stated goals are met, whichever comes first.    PROBLEM LIST:   (Skilled intervention is medically necessary to address:)  Decreased ADL/Functional Activities  Decreased Activity Tolerance  Decreased Balance  Decreased Coordination  Decreased Gait Ability  Decreased Safety Awareness  Decreased Strength  Decreased Transfer Abilities  Increased Pain   INTERVENTIONS PLANNED:  (Benefits and precautions of occupational therapy have been discussed with the patient.)  Self Care Training  Therapeutic Activity  Therapeutic Exercise/HEP  Neuromuscular Re-education  Education         TREATMENT:     EVALUATION: LOW COMPLEXITY: (Untimed Charge)    TREATMENT:   SELF CARE: (15 minutes):   Procedure(s) (per grid) utilized to improve and/or restore self-care/home management as related to dressing and functional mobility and standing tolerance . Required minimal visual, verbal, manual, and tactile cueing to facilitate activities of daily living skills, compensatory activities, and OT poc and discharge planning . AFTER TREATMENT PRECAUTIONS: Bed/Chair Locked, Call light within reach, Chair, Needs within reach, RN notified, and Visitors at bedside    INTERDISCIPLINARY COLLABORATION:  RN/ PCT, PT/ PTA, and OT/ CELIS    EDUCATION:  Education Given To: Patient; Family  Education Provided: Role of Therapy;Plan of Care;Precautions;Transfer Training; Fall Prevention Strategies; Equipment; Family Education; ADL Adaptive Strategies  Education Method: Demonstration;Verbal  Barriers to Learning: None  Education Outcome: Verbalized understanding;Continued education needed    TOTAL TREATMENT DURATION AND TIME:  Time In: 1605  Time Out: [address] [facility]  Minutes: [address] OT

## 2022-10-19 NOTE — PROGRESS NOTES
H&P/Consult Note/Progress Note/Office Note:   Mary Mina  MRN: [id number]  :1935  Age:87 y.o.    HPI: Mary Mina is a 81 y.o. male who came to the ER on 10/18/22 via EMS with fatigue and increased urinary frequency. He was tachycardiac and met sepsis criteria. Bld culture drawn  IV Zosyn initiated      In ER WBC and LFTs normal  Creatinine 3.28 on admission 10/18/22    He tales daily aspirin  No acid reduction therapy. He described falling 2 weeks prior  He doesn't think he was taking NSAIDs after the fall   He is followed by Dr Rei Salazar at the wound center for a right elbow traumatic abrasions after a prior fall (Last seen on 10/14/22)      Imaging in ER identified right acetabular fracture and ramus fracture  CT as below with RUQ inflamm changes  Gen Surgery was consulted            10/18/22 CT chest/abd/pelvis without contrast  Hx: Trauma sustained in fall 2 weeks ago. Back pain and hip pain      FINDINGS:   -LUNGS: No pneumothorax. No pulmonary contusion. Bronchiectasis and peribronchial thickening with some consolidation in the right upper lobe which appears chronic. -MEDIASTINUM/AXILLA: No mediastinal hematoma. No gross adenopathy. -HEART/VESSELS: Heart appears anemic. -CHEST WALL/BONES: No gross rib fractures. -STERNUM: No gross fracture. -LIVER: Uniform on this noncontrast exam.   -GALLBLADDER/BILE DUCTS: No gallstones or bile duct dilation. -PANCREAS: Atrophic.   -SPLEEN: Uniform on this noncontrast exam.     -ADRENALS: Left adrenal is enlarged, 3.4 cm.   -KIDNEYS/URETERS: 3.5 cm and 4.0 cm cysts left kidney and 3.7 cm cyst right kidney. -BLADDER: Quite distended. -REPRODUCTIVE ORGANS: Prostate is enlarged. There is stranding densities in the right spermatic cord which track up into the abdomen. -BOWEL: Normal caliber. -LYMPH NODES: No significant retroperitoneal, mesenteric, or pelvic adenopathy. -BONES: Fracture right inferior pubic ramus.  There is some callus formation. Fracture anterior lip of the right acetabulum. Moderate facet arthropathy throughout the lumbar spine. -VASCULATURE: Aorta is densely calcified. -OTHER: There is stranding densities in the RUQ adjacent to the gallbladder and 1st and 2nd portion of the duodenum and in Morison's pouch. This tracks inferiorly. Exam limited due to lack of intravenous contrast.     Impression:  1) Nondisplaced fracture anterior lip right acetabulum and fracture inferior pubic ramus on the right with small amount of callus formation. 2) Stranding densities in the right upper quadrant, near the neck the gallbladder, first and second portion of the duodenum and in Morison's pouch. This could be acute inflammation or posttraumatic hemorrhage. 3) Stranding densities tract along the right abdomen down into the spermatic cord and upper scrotum on the right, inflammatory versus posttraumatic. 4) A 3.4 cm left adrenal mass, neoplasia versus adrenal hemorrhage. 5) Urinary bladder is quite distended.          Additional hx:  10/19/22 Feels better; denies abd pain; AF/VSS; WBC normal; ortho appears to recommend non-operative Rx; Creatine 3.28-->2.03 this am with IVF hydration and haines decompression; Urology consulted                Past Medical History:   Diagnosis Date    Acute kidney injury ([address] 1/25/2022    Cellulitis of forearm, right 4/21/2022    Diabetes ([address]     Gastroenteritis 9/1/2021    Hyperkalemia 1/25/2022    Hypertension      Past Surgical History:   Procedure Laterality Date    FRACTURE SURGERY       Current Facility-Administered Medications   Medication Dose Route Frequency    lactated ringers infusion   IntraVENous Continuous    potassium chloride (KLOR-CON M) extended release tablet 40 mEq  40 mEq Oral PRN    Or    potassium bicarb-citric acid (EFFER-K) effervescent tablet 40 mEq  40 mEq Oral PRN    Or    potassium chloride 10 mEq/100 mL IVPB (Peripheral Line)  10 mEq IntraVENous PRN    magnesium sulfate 2000 mg in 50 mL IVPB premix  2,000 mg IntraVENous PRN    sodium phosphate 10 mmol in sodium chloride 0.9 % 250 mL IVPB  10 mmol IntraVENous PRN    Or    sodium phosphate 15 mmol in sodium chloride 0.9 % 250 mL IVPB  15 mmol IntraVENous PRN    Or    sodium phosphate 20 mmol in sodium chloride 0.9 % 500 mL IVPB  20 mmol IntraVENous PRN    pantoprazole (PROTONIX) 40 mg in sodium chloride (PF) 10 mL injection  40 mg IntraVENous Daily    sodium chloride flush 0.9 % injection 5-40 mL  5-40 mL IntraVENous 2 times per day    sodium chloride flush 0.9 % injection 5-40 mL  5-40 mL IntraVENous PRN    0.9 % sodium chloride infusion   IntraVENous PRN    ondansetron (ZOFRAN-ODT) disintegrating tablet 4 mg  4 mg Oral Q8H PRN    Or    ondansetron (ZOFRAN) injection 4 mg  4 mg IntraVENous Q6H PRN    acetaminophen (TYLENOL) tablet 650 mg  650 mg Oral Q6H PRN    Or    acetaminophen (TYLENOL) suppository 650 mg  650 mg Rectal Q6H PRN    tuberculin injection 5 Units  5 Units IntraDERmal Once     ALLERGIES:  Patient has no known allergies. Social History     Socioeconomic History    Marital status:      Spouse name: None    Number of children: None    Years of education: None    Highest education level: None   Tobacco Use    Smoking status: Never    Smokeless tobacco: Never   Substance and Sexual Activity    Alcohol use: No    Drug use: No     Social History     Tobacco Use   Smoking Status Never   Smokeless Tobacco Never     History reviewed. No pertinent family history. ROS: The patient has no difficulty with chest pain or shortness of breath. No fever or chills. Comprehensive review of systems was otherwise unremarkable except as noted above.     Physical Exam:   BP (!) 120/50   Pulse 75   Temp 98.4 °F (36.9 °C) (Oral)   Resp 18   Ht 6' 1\" (1.854 m)   Wt 142 lb (64.4 kg)   SpO2 99%   BMI 18.73 kg/m²   Vitals:    10/19/22 0300 10/19/22 0400 10/19/22 0500 10/19/22 0600   BP: (!) 116/59 (!) 114/56 (!) 116/51 (!) 120/50   Pulse: 85 75 78 75   Resp: 19 18     Temp:  98.4 °F (36.9 °C)     TempSrc:  Oral     SpO2: 98% 99% 99% 99%   Weight:       Height:         10/19 0701 - 10/19 1900  In: -   Out: 650 [Urine:650]  10/17 1901 - 10/19 0700  In: 535 [I.V.:535]  Out: 2025 [Urine:2025]    Constitutional: Alert, oriented, cooperative patient in no acute distress; appears stated age    Eyes:Sclera are clear. EOMs intact  ENMT: no external lesions; +difficulty hearing; no obvious neck masses, no ear or lip lesions, nares normal  CV: RRR. Normal perfusion  Resp: No JVD. Breathing is  non-labored; no audible wheezing. GI: soft and non-distended; non-tender     Musculoskeletal: unremarkable with normal function. No embolic signs or cyanosis.    Neuro:  Oriented; moves all 4; no focal deficits  Psychiatric: normal affect and mood, no memory impairment    Recent vitals (if inpt):  Patient Vitals for the past 24 hrs:   BP Temp Temp src Pulse Resp SpO2 Height Weight   10/19/22 0600 (!) 120/50 -- -- 75 -- 99 % -- --   10/19/22 0500 (!) 116/51 -- -- 78 -- 99 % -- --   10/19/22 0400 (!) 114/56 98.4 °F (36.9 °C) Oral 75 18 99 % -- --   10/19/22 0300 (!) 116/59 -- -- 85 19 98 % -- --   10/19/22 0200 (!) 117/58 -- -- 78 17 98 % -- --   10/19/22 0100 (!) 118/56 -- -- 73 16 99 % -- --   10/19/22 0000 (!) 118/58 98.4 °F (36.9 °C) Oral 75 14 97 % -- --   10/18/22 2300 (!) 121/59 -- -- 76 16 99 % -- --   10/18/22 2200 (!) 141/67 -- -- 88 15 98 % -- --   10/18/22 2121 -- -- -- -- -- -- 6' 1\" (1.854 m) 142 lb (64.4 kg)   10/18/22 2120 -- 97.9 °F (36.6 °C) Oral -- -- -- -- --   10/18/22 2115 (!) 142/71 97.9 °F (36.6 °C) Oral 94 20 97 % -- --   10/18/22 2028 (!) 151/66 -- -- 87 20 97 % -- --   10/18/22 2025 -- -- -- 81 20 98 % -- --   10/18/22 1959 128/64 -- -- 85 17 98 % -- --   10/18/22 1859 137/63 -- -- 86 (!) 41 -- -- --   10/18/22 1759 (!) 118/56 -- -- 80 26 -- -- --   10/18/22 1728 135/63 -- -- 93 19 -- -- -- 10/18/22 1628 (!) 117/59 -- -- 76 19 -- -- --   10/18/22 1528 (!) 121/58 -- -- 79 18 -- -- --   10/18/22 1324 123/60 -- -- 85 14 100 % -- --   10/18/22 1215 136/64 97.8 °F (36.6 °C) Oral 96 20 100 % 6' (1.829 m) 144 lb (65.3 kg)       Amount and/or Complexity of Data Reviewed and Analyzed:  I reviewed and analyzed all of the unique labs and radiologic studies that are shown below as well as any that are in the HPI, and any that are in the expanded problem list below  *Each unique test, order, or document contributes to the combination of 2 or combination of 3 in Category 1 below. For this visit I also reviewed old records and prior notes. Recent Labs     10/19/22  0305   WBC 7.6   HGB 9.0*         K 4.7   *   CO2 23   BUN 61*   ALT 16     Review of most recent CBC  Lab Results   Component Value Date    WBC 7.6 10/19/2022    HGB 9.0 (L) 10/19/2022    HCT 28.2 (L) 10/19/2022    MCV 97.6 10/19/2022     10/19/2022       Review of most recent BMP  Lab Results   Component Value Date/Time     10/19/2022 03:05 AM    K 4.7 10/19/2022 03:05 AM     10/19/2022 03:05 AM    CO2 23 10/19/2022 03:05 AM    BUN 61 10/19/2022 03:05 AM    CREATININE 2.03 10/19/2022 03:05 AM    GLUCOSE 160 10/19/2022 03:05 AM    CALCIUM 8.2 10/19/2022 03:05 AM        Review of most recent LFTs (and lipase if done)  Lab Results   Component Value Date    ALT 16 10/19/2022    AST 17 10/19/2022    ALKPHOS 79 10/19/2022    BILITOT 0.4 10/19/2022     No results found for: LIPASE    No results found for: INR, APTT, LCAD    Review of most recent HgbA1c  No results found for: LABA1C    Nutritional assessment screen for wound healing issues:  Lab Results   Component Value Date    LABALBU 2.7 (L) 10/19/2022       @lastcovr@    Xray Result (most recent):  XR ELBOW RIGHT (MIN 3 VIEWS) 10/18/2022    Narrative  RIGHT ELBOW 3 views. INDICATION:  Elbow pain following injury sustained in fall 2 weeks ago. .    TECHNIQUE: AP and lateral and oblique views. COMPARISON: None. FINDINGS: The radial head appears intact. No abnormal fat pad signs. No fracture  or dislocation. There are osteophytes off the radial head. Impression  Negative for acute fracture. Osteoarthritis. CT Result (most recent):  CT CHEST ABDOMEN PELVIS WO CONTRAST 10/18/2022    Narrative  CT OF THE CHEST ABDOMEN AND PELVIS without intravenous and without oral  contrast.    INDICATION: Trauma sustained in fall 2 weeks ago. Back pain and hip pain    TECHNIQUE:  Multiple axial images were obtained through the chest, abdomen and  pelvis. Radiation dose reduction techniques were used for this study. All CT  scans performed at this facility use one or all of the following: Automated  exposure control, adjustment of the mA and/or kVp according to patient's size,  iterative reconstruction. COMPARISON: None    FINDINGS:  -LUNGS:  No pneumothorax. No pulmonary contusion. Bronchiectasis and peribronchial thickening with some consolidation in the right  upper lobe which appears chronic. -MEDIASTINUM/AXILLA: No mediastinal hematoma. No gross adenopathy. -HEART/VESSELS: Heart appears anemic. -CHEST WALL/BONES: No gross rib fractures. -STERNUM: No gross fracture. -LIVER: Uniform on this noncontrast exam.  -GALLBLADDER/BILE DUCTS: No gallstones or bile duct dilation. -PANCREAS: Atrophic.  -SPLEEN: Uniform on this noncontrast exam.    -ADRENALS: Left adrenal is enlarged, 3.4 cm.  -KIDNEYS/URETERS: 3.5 cm and 4.0 cm cysts left kidney and 3.7 cm cyst right  kidney. -BLADDER: Quite distended. -REPRODUCTIVE ORGANS: Prostate is enlarged. There is stranding densities in the right spermatic cord which track up into the  abdomen. -BOWEL: Normal caliber. -LYMPH NODES: No significant retroperitoneal, mesenteric, or pelvic adenopathy. -BONES:  Fracture right inferior pubic ramus. There is some callus formation.   Fracture anterior lip of the right acetabulum. Moderate facet arthropathy throughout the lumbar spine. -VASCULATURE: Aorta is densely calcified. -OTHER: There is stranding densities in the right upper quadrant adjacent to the  gallbladder and first and second portion of the duodenum and in Morison's pouch. This tracks inferiorly. Impression  Exam limited due to lack of intravenous contrast.  1) Nondisplaced fracture anterior lip right acetabulum and fracture inferior  pubic ramus on the right with small amount of callus formation. 2) Stranding densities in the right upper quadrant, near the neck the  gallbladder, first and second portion of the duodenum and in Morison's pouch. This could be acute inflammation or posttraumatic hemorrhage. 3) Stranding densities tract along the right abdomen down into the spermatic  cord and upper scrotum on the right, inflammatory versus posttraumatic. 4) A 3.4 cm left adrenal mass, neoplasia versus adrenal hemorrhage. 5) Urinary bladder is quite distended. US Result (most recent):  No results found for this or any previous visit from the past 3650 days.       Admission date (for inpatients): 10/18/2022   * No surgery found *  * No surgery found *        ASSESSMENT/PLAN:  [unfilled]  Principal Problem:    Abnormal CT abd with RUQ inflammation  Active Problems:    Skin tear of elbow without complication    Closed nondisplaced fracture of anterior wall of right acetabulum (HCC)    Inferior pubic ramus fracture, right, closed, initial encounter ([address]    Acute renal failure with acute tubular necrosis superimposed on stage 3a chronic kidney disease (HCC)    Fall    Contusion of abdominal wall    Generalized weakness    Closed nondisplaced fracture of anterior wall of right acetabulum with delayed healing    Urinary retention    Hyperlipidemia    Gout    Chronic kidney disease, stage 3a (HCC)    Normocytic anemia    Primary hypertension    Diabetes mellitus type 2, controlled ([address]  Resolved Problems:    * No resolved hospital problems. *     Patient Active Problem List    Diagnosis Date Noted    Abnormal CT abd with RUQ inflammation 10/18/2022     Priority: Medium    Closed nondisplaced fracture of anterior wall of right acetabulum ([address] 10/18/2022     Priority: Medium    Inferior pubic ramus fracture, right, closed, initial encounter ([address] 10/18/2022     Priority: Medium    Acute renal failure with acute tubular necrosis superimposed on stage 3a chronic kidney disease ([address] 10/18/2022     Priority: Medium    Fall 10/18/2022     Priority: Medium    Contusion of abdominal wall 10/18/2022     Priority: Medium    Generalized weakness 10/18/2022     Priority: Medium    Closed nondisplaced fracture of anterior wall of right acetabulum with delayed healing 10/18/2022     Priority: Medium    Urinary retention 10/18/2022     Priority: Medium    Skin tear of elbow without complication 40/71/6215     Priority: Medium    Pain in right hip 10/07/2022     Priority: Medium    Chronic kidney disease, stage 3a ([address] 03/23/2022     Priority: Medium     sCr: ~1.3mg/dL in the setting of age related decline, and history of HTN/DM. Urinary incontinence 01/12/2022     Priority: Medium    Unilateral primary osteoarthritis of first carpometacarpal joint, unspecified hand 09/01/2021     Priority: Medium    Lumbosacral radiculopathy 09/01/2021     Priority: Medium    Hyperlipidemia 09/01/2021     Priority: Medium    Gout 09/01/2021     Priority: Medium     gout attack 5/2022 with elevated uric acid. Allopurinol 50mg daily.       Elevated PSA 09/01/2021     Priority: Medium    Eczema 09/01/2021     Priority: Medium    Actinic keratosis 09/01/2021     Priority: Medium    Normocytic anemia 01/26/2022    Primary hypertension 01/25/2022    Diabetes mellitus type 2, controlled ([address] 01/25/2022          Number and Complexity of Problems addressed and   Risks of complications and/or morbidity of management        Right acetabular and inferior rami fracture  Ortho consulted and appears they are recommending non-operative Rx      Acute renal failure with Urinary retention  with bladder distention on CT and increased frequency prior to admission  Aguilar-->gravity  Hydrate  Follow BMP daily  Creatinine 3.28-->2.03 by 10/19/22 with IV hydration and Aguilar decompression  Urology consulted    RUQ inflammation on CT  Possibly duodenitis (possible NSAID use after recent fall)  Maalox x 1 on 10/19/22 and IV Protonix resulted in resolution of RUQ pain  Recommend Protonix daily while inpt  Prilosec 20mg qday outpt for 1 month  No EGD unless symptoms recur      3.4cm left Adrenal mass found incidentally  CT 10/18/22  Neoplasia vs hemorrhage per radiology  Outpt Endocrinology referral placed by Dr Lucas Leger on 10/19/22      Elbow contusions after prior trauma  Local wound care              Level of MDM (2/3 elements below)  Number and Complexity of Problems Addressed Amount and/or Complexity of Data to be Reviewed and Analyzed  *Each unique test, order, or document contributes to the combination of 2 or combination of 3 in Category 1 below. Risk of Complications and/or Morbidity or Mortality of pt Management     59929  [id number]  Minimal  ?1self-limited or minor problem Minimal or none Minimal risk of morbidity from additional diagnostic testing or Rx   [id number]  [id number] Low Low  ? 2or more self-limited or minor problems;    or  ? 1stable chronic illness;    or  ?1acute, uncomplicated illness or injury   Limited  (Must meet the requirements of at least 1 of the 2 categories)  Category 1: Tests and documents   ? Any combination of 2 from the following:  ?Review of prior external note(s) from each unique source*;  ?review of the result(s) of each unique test*;   ?ordering of each unique test*    or   Category 2: Assessment requiring an independent historian(s)  (For the categories of independent interpretation of tests and discussion of management or test interpretation, see moderate or following:   ?Review of prior external note(s) from each unique source*;  ?Review of the result(s) of each unique test*;   ?Ordering of each unique test*;   ?Assessment requiring an independent historian(s)    or   Category 2: Independent interpretation of tests   ? Independent interpretation of a test performed by another physician/other qualified health care professional (not separately reported);     or  Category 3: Discussion of management or test interpretation  ? Discussion of management or test interpretation with external physician/other qualified health care professional/appropriate source (not separately reported)   High risk of morbidity from additional diagnostic testing or treatment  Examples only:  ?Drug therapy requiring intensive monitoring for toxicity  ? Decision regarding elective major surgery with identified patient or procedure risk factors  ? Decision regarding emergency major surgery  ? Decision regarding hospitalization  ? Decision not to resuscitate or to de-escalate care because of poor prognosis             I have personally performed a face-to-face diagnostic evaluation and management  service on this patient. I have independently seen the patient. I have independently obtained the above history from the patient/family. I have independently examined the patient with above findings. I have independently reviewed data/labs for this patient and developed the above plan of care (MDM).       Signed: Glen Anderson MD  10/19/2022    7:42 AM

## 2022-10-19 NOTE — PLAN OF CARE
injury  Outcome: Progressing     Problem: ABCDS Injury Assessment  Goal: Absence of physical injury  Outcome: Progressing     Problem: Skin/Tissue Integrity  Goal: Absence of new skin breakdown  Description: 1. Monitor for areas of redness and/or skin breakdown  2. Assess vascular access sites hourly  3. Every 4-6 hours minimum:  Change oxygen saturation probe site  4. Every 4-6 hours:  If on nasal continuous positive airway pressure, respiratory therapy assess nares and determine need for appliance change or resting period.   Outcome: Progressing

## 2022-10-19 NOTE — ED NOTES
TRANSFER - OUT REPORT:    Verbal report given to Jose Bahena on Liliya Ax  being transferred to [phone] for routine progression of patient care       Report consisted of patient's Situation, Background, Assessment and   Recommendations(SBAR). Information from the following report(s) Nurse Handoff Report was reviewed with the receiving nurse. Lines:   Peripheral IV 10/18/22 Right Antecubital (Active)        Opportunity for questions and clarification was provided.       Patient transported with:  Registered Nurse     Candelaria Chirinos RN  10/18/22 [id number]

## 2022-10-20 LAB
ALBUMIN SERPL-MCNC: 2.5 G/DL (ref 3.2–4.6)
ALBUMIN/GLOB SERPL: 0.9 {RATIO} (ref 0.4–1.6)
ALP SERPL-CCNC: 74 U/L (ref 50–136)
ALT SERPL-CCNC: 14 U/L (ref 12–65)
ANION GAP SERPL CALC-SCNC: 5 MMOL/L (ref 2–11)
AST SERPL-CCNC: 14 U/L (ref 15–37)
BASOPHILS # BLD: 0.1 K/UL (ref 0–0.2)
BASOPHILS NFR BLD: 1 % (ref 0–2)
BILIRUB SERPL-MCNC: 0.3 MG/DL (ref 0.2–1.1)
BUN SERPL-MCNC: 40 MG/DL (ref 8–23)
CALCIUM SERPL-MCNC: 8.5 MG/DL (ref 8.3–10.4)
CHLORIDE SERPL-SCNC: 113 MMOL/L (ref 101–110)
CO2 SERPL-SCNC: 24 MMOL/L (ref 21–32)
CREAT SERPL-MCNC: 1.18 MG/DL (ref 0.8–1.5)
DIFFERENTIAL METHOD BLD: ABNORMAL
EOSINOPHIL # BLD: 0.3 K/UL (ref 0–0.8)
EOSINOPHIL NFR BLD: 4 % (ref 0.5–7.8)
ERYTHROCYTE [DISTWIDTH] IN BLOOD BY AUTOMATED COUNT: 15.7 % (ref 11.9–14.6)
EST. AVERAGE GLUCOSE BLD GHB EST-MCNC: 169 MG/DL
GLOBULIN SER CALC-MCNC: 2.8 G/DL (ref 2.8–4.5)
GLUCOSE SERPL-MCNC: 198 MG/DL (ref 65–100)
HBA1C MFR BLD: 7.5 % (ref 4.8–5.6)
HCT VFR BLD AUTO: 27.8 % (ref 41.1–50.3)
HGB BLD-MCNC: 8.7 G/DL (ref 13.6–17.2)
IMM GRANULOCYTES # BLD AUTO: 0.2 K/UL (ref 0–0.5)
IMM GRANULOCYTES NFR BLD AUTO: 3 % (ref 0–5)
LYMPHOCYTES # BLD: 0.8 K/UL (ref 0.5–4.6)
LYMPHOCYTES NFR BLD: 10 % (ref 13–44)
MCH RBC QN AUTO: 30.3 PG (ref 26.1–32.9)
MCHC RBC AUTO-ENTMCNC: 31.3 G/DL (ref 31.4–35)
MCV RBC AUTO: 96.9 FL (ref 82–102)
MONOCYTES # BLD: 0.7 K/UL (ref 0.1–1.3)
MONOCYTES NFR BLD: 9 % (ref 4–12)
NEUTS SEG # BLD: 5.6 K/UL (ref 1.7–8.2)
NEUTS SEG NFR BLD: 73 % (ref 43–78)
NRBC # BLD: 0 K/UL (ref 0–0.2)
PHOSPHATE SERPL-MCNC: 2.9 MG/DL (ref 2.3–3.7)
PLATELET # BLD AUTO: 183 K/UL (ref 150–450)
PMV BLD AUTO: 10.7 FL (ref 9.4–12.3)
POTASSIUM SERPL-SCNC: 5.3 MMOL/L (ref 3.5–5.1)
PROT SERPL-MCNC: 5.3 G/DL (ref 6.3–8.2)
RBC # BLD AUTO: 2.87 M/UL (ref 4.23–5.6)
SODIUM SERPL-SCNC: 142 MMOL/L (ref 133–143)
TSH W FREE THYROID IF ABNORMAL: 2.25 UIU/ML (ref 0.36–3.74)
WBC # BLD AUTO: 7.6 K/UL (ref 4.3–11.1)

## 2022-10-20 PROCEDURE — 99232 SBSQ HOSP IP/OBS MODERATE 35: CPT | Performed by: SURGERY

## 2022-10-20 PROCEDURE — C9113 INJ PANTOPRAZOLE SODIUM, VIA: HCPCS | Performed by: SURGERY

## 2022-10-20 PROCEDURE — 51702 INSERT TEMP BLADDER CATH: CPT

## 2022-10-20 PROCEDURE — 85025 COMPLETE CBC W/AUTO DIFF WBC: CPT

## 2022-10-20 PROCEDURE — 2580000003 HC RX 258: Performed by: NURSE PRACTITIONER

## 2022-10-20 PROCEDURE — 36415 COLL VENOUS BLD VENIPUNCTURE: CPT

## 2022-10-20 PROCEDURE — 97530 THERAPEUTIC ACTIVITIES: CPT

## 2022-10-20 PROCEDURE — 84100 ASSAY OF PHOSPHORUS: CPT

## 2022-10-20 PROCEDURE — 80053 COMPREHEN METABOLIC PANEL: CPT

## 2022-10-20 PROCEDURE — 6360000002 HC RX W HCPCS: Performed by: SURGERY

## 2022-10-20 PROCEDURE — 1100000000 HC RM PRIVATE

## 2022-10-20 PROCEDURE — 6370000000 HC RX 637 (ALT 250 FOR IP): Performed by: FAMILY MEDICINE

## 2022-10-20 PROCEDURE — 84443 ASSAY THYROID STIM HORMONE: CPT

## 2022-10-20 PROCEDURE — 94760 N-INVAS EAR/PLS OXIMETRY 1: CPT

## 2022-10-20 PROCEDURE — 83036 HEMOGLOBIN GLYCOSYLATED A1C: CPT

## 2022-10-20 PROCEDURE — A4216 STERILE WATER/SALINE, 10 ML: HCPCS | Performed by: SURGERY

## 2022-10-20 PROCEDURE — 2580000003 HC RX 258: Performed by: SURGERY

## 2022-10-20 RX ORDER — POLYETHYLENE GLYCOL 3350 17 G/17G
17 POWDER, FOR SOLUTION ORAL DAILY
Status: CANCELLED | OUTPATIENT
Start: 2022-10-20

## 2022-10-20 RX ADMIN — SODIUM CHLORIDE 40 MG: 9 INJECTION, SOLUTION INTRAMUSCULAR; INTRAVENOUS; SUBCUTANEOUS at 08:50

## 2022-10-20 RX ADMIN — SODIUM CHLORIDE, PRESERVATIVE FREE 10 ML: 5 INJECTION INTRAVENOUS at 20:16

## 2022-10-20 RX ADMIN — SODIUM ZIRCONIUM CYCLOSILICATE 5 G: 5 POWDER, FOR SUSPENSION ORAL at 08:49

## 2022-10-20 RX ADMIN — SODIUM CHLORIDE, PRESERVATIVE FREE 10 ML: 5 INJECTION INTRAVENOUS at 09:45

## 2022-10-20 ASSESSMENT — PAIN DESCRIPTION - ORIENTATION: ORIENTATION: LEFT;RIGHT

## 2022-10-20 ASSESSMENT — PAIN SCALES - GENERAL
PAINLEVEL_OUTOF10: 0
PAINLEVEL_OUTOF10: 0
PAINLEVEL_OUTOF10: 2
PAINLEVEL_OUTOF10: 0
PAINLEVEL_OUTOF10: 3
PAINLEVEL_OUTOF10: 0

## 2022-10-20 ASSESSMENT — PAIN DESCRIPTION - LOCATION: LOCATION: HIP

## 2022-10-20 ASSESSMENT — PAIN - FUNCTIONAL ASSESSMENT: PAIN_FUNCTIONAL_ASSESSMENT: ACTIVITIES ARE NOT PREVENTED

## 2022-10-20 NOTE — PROGRESS NOTES
attempted to visit patient. Patient appeared to be sleeping comfortably at time and did not wake to name being called.  consulted with RN and patient's chart for spiritual concerns.  provided prayer and is available to follow up.    Signed by  Quincy Amin M.Div.

## 2022-10-20 NOTE — PROGRESS NOTES
ACUTE PHYSICAL THERAPY GOALS:   (Developed with and agreed upon by patient and/or caregiver.)  DISCHARGE GOALS :  (1.)Mr. Puja Chester will move from supine to sit and sit to supine  with STAND BY ASSIST.   (2.)Mr. Puja Chester will transfer from bed to chair and chair to bed with STAND BY ASSIST using a walker. (3.)Mr. Puja Chester will ambulate with STAND BY ASSIST for 300 feet with a rolling walker. 4) pt able to go up & down 5 steps with rail & CGA, device PRN. _____________________________     PHYSICAL THERAPY Daily Note and AM  (Link to Caseload Tracking: PT Visit Days : 2  Acknowledge Orders  Time In/Out  PT Charge Capture  Rehab Caseload Tracker    Manish Recinos is a 81 y.o. male   PRIMARY DIAGNOSIS: Sepsis with acute renal failure and tubular necrosis (HCC)  Generalized weakness [R53.1]  Closed nondisplaced fracture of anterior wall of right acetabulum, initial encounter ([facility] [address] [S32.414A]  Abdominal trauma, initial encounter [S39.91XA]  Inferior pubic ramus fracture, right, closed, initial encounter ([address] [S32.591A]  Acute renal failure, unspecified acute renal failure type ([address] [N17.9]       Reason for Referral: Generalized Muscle Weakness (M62.81)  Other lack of cordination (R27.8)  Difficulty in walking, Not elsewhere classified (R26.2)  History of falling (Z91.81)  Inpatient: Payor: Isabel Brown / Plan: [employer] G[employer]US HMO / Product Type: *No Product type* /     ASSESSMENT:     REHAB RECOMMENDATIONS:   Recommendation to date pending progress:  Setting:  Inpatient [facility]  If not approved by insurance, then STR is next recommendation. Since pt lives independently and still works, then [facility] would be an excellent option for intensive therapy    Equipment:    To Be Determined     ASSESSMENT:  Mr. Puja Chester presents in no distress, pleasant & cooperative, participated very well with therapy.  This pt is much more limited & functioning below his reported baseline since his right acetabulum & inferior pubic rami Fx due to a fall at home. This pt is completely unsafe to return home alone, but could benefit from aggressive in-pt post acute therapy at an inpt rehab facility on hospital DC. MSW/CM aware of DC needs. He is a little hard of hearing even though he wears hearing aides. This am, we discussed IRC with  and he is interested in this since he lives alone and is currently working. He is in recliner as I enter room. We review a PT HEP and he performs these with my assistance. Some discomfort noted with hip exs. He moves sit to stand with min assist. Once upright, he ambulated 61' with RW and close CGA. Before he returned to the recliner I added a pillow to make him more comfortable. Pain noted when sitting on firm surfaces. Left upright with needs in reach,. [address] [facility] [address] [id number] AM-PAC 6 Clicks Basic Mobility Inpatient Short Form  AM-PAC Mobility Inpatient   How much difficulty turning over in bed?: A Little  How much difficulty sitting down on / standing up from a chair with arms?: A Little  How much difficulty moving from lying on back to sitting on side of bed?: A Little  How much help from another person moving to and from a bed to a chair?: A Little  How much help from another person needed to walk in hospital room?: A Little  How much help from another person for climbing 3-5 steps with a railing?: Total  AM-PAC Inpatient Mobility Raw Score : 16  AM-PAC Inpatient T-Scale Score : 40.78  Mobility Inpatient CMS 0-100% Score: 54.16  Mobility Inpatient CMS G-Code Modifier : CK    SUBJECTIVE:   Mr. Nadine Christian states, \"I'm not comfortable sitting in this chair. I need a pillow. 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    OBJECTIVE:     PAIN: VITALS / O2: PRECAUTION / Maryam Uday / DRAINS:   Pre Treatment:   Pain Assessment: 0-10  Pain Level: 3  Pain Location: Hip  Pain Orientation: Left;Right  Functional Pain Assessment: Activities are not prevented  Non-Pharmaceutical Pain Intervention(s): Ambulation/Increased Activity; Distraction;Elevation; Emotional support; Exercise;Repositioned      Post Treatment: 1/10, in recliner with Les elevated Vitals    monitored    Oxygen monitored RA      Aguilar Catheter and Telemetry     RESTRICTIONS/PRECAUTIONS:  Restrictions/Precautions: Fall Risk, Weight Bearing  Right Lower Extremity Weight Bearing: Weight Bearing As Tolerated  Left Lower Extremity Weight Bearing: Weight Bearing As Tolerated              GROSS EVALUATION: Intact Impaired (Comments):   AROM []  Decreased but functional   PROM []    Strength []  Decreased but functional   Balance []  Decreased but functional   Posture [] N/A   Sensation [x]  grossly   Coordination []   Decreased but functional   Tone []  Decreased but functional   Edema []    Activity Tolerance []  Fair-    []      COGNITION/  PERCEPTION: Intact Impaired (Comments):   Orientation [x]     Vision [x]     Hearing [x]     Cognition  [x]       MOBILITY: I Mod I S SBA CGA Min Mod Max Total  NT x2 Comments:   Bed Mobility    Rolling [] [] [] [] [] [] [] [] [] [] []    Supine to Sit [] [] [] [] [] [] [] [] [] [] []    Scooting [] [] [] [] [] [] [] [] [] [] []    Sit to Supine [] [] [] [] [] [] [] [] [] [] []    Transfers    Sit to Stand [] [] [] [] [] [x] [] [] [] [] []    Bed to Chair [] [] [] [] [x] [] [] [] [] [] []    Stand to Sit [] [] [] [] [x] [] [] [] [] [] []     [] [] [] [] [] [] [] [] [] [] []    I=Independent, Mod I=Modified Independent, S=Supervision, SBA=Standby Assistance, CGA=Contact Guard Assistance,   Min=Minimal Assistance, Mod=Moderate Assistance, Max=Maximal Assistance, Total=Total Assistance, NT=Not Tested    GAIT: I Mod I S SBA CGA Min Mod Max Total  NT x2 Comments:   Level of Assistance [] [] [] [] [x] [] [] [] [] [] []    [id number]     DME Rolling Walker    Gait Quality Antalgic, Decreased rosana , Decreased step clearance, Decreased step length, and Decreased stance    Weightbearing Status Lower Extremity Weight Bearing Restrictions  Right Lower Extremity Weight Bearing: Weight Bearing As Tolerated  Left Lower Extremity Weight Bearing: Weight Bearing As Tolerated    Stairs NT     I=Independent, Mod I=Modified Independent, S=Supervision, SBA=Standby Assistance, CGA=Contact Guard Assistance,   Min=Minimal Assistance, Mod=Moderate Assistance, Max=Maximal Assistance, Total=Total Assistance, NT=Not Tested    PLAN:   FREQUENCY AND DURATION: Daily for duration of hospital stay or until stated goals are met, whichever comes first.    THERAPY PROGNOSIS: Good    PROBLEM LIST:   (Skilled intervention is medically necessary to address:)  Decreased ADL/Functional Activities  Decreased Activity Tolerance  Decreased AROM/PROM  Decreased Balance  Decreased Coordination  Decreased Gait Ability  Decreased Safety Awareness  Decreased Strength  Decreased Transfer Abilities  Increased Pain INTERVENTIONS PLANNED:   (Benefits and precautions of physical therapy have been discussed with the patient.)  Self Care Training  Therapeutic Activity  Therapeutic Exercise/HEP  Gait Training  Education       TREATMENT:   EVALUATION: HIGH COMPLEXITY: (Untimed Charge)    TREATMENT:   Therapeutic Activity (30 Minutes): Therapeutic activity included Scooting, Transfer Training, Ambulation on level ground, Standing balance, and LE exs, positioning and discussion of DC options and levels of care to improve functional Activity tolerance, Balance, Coordination, Mobility, Strength, and ROM.     TREATMENT GRID: Date:  10/20/22 Date:   Date:     Activity/Exercise Parameters Parameters Parameters   RECLINED: ankle DF/PF 10          Hip AB/AD 10          Heels Slides 10AA          SLR 10     SITTING: LAQ 10                     AFTER TREATMENT PRECAUTIONS: Bed/Chair Locked, Call light within reach, Chair, Needs within reach, and RN notified    INTERDISCIPLINARY COLLABORATION:  MD/ PA/ NP , RN/ PCT, PT/ PTA, and RN Case Manager/      EDUCATION: Education Given To: Patient  Education Provided: Role of Therapy;Plan of Care;Home Exercise Program;Precautions;Transfer Training; Fall Prevention Strategies  Education Method: Demonstration;Verbal  Education Outcome: Verbalized understanding;Demonstrated understanding    TIME IN/OUT:  Time In: 0815  Time Out: 0845  Minutes: [address] [place] PT

## 2022-10-20 NOTE — PROGRESS NOTES
H&P/Consult Note/Progress Note/Office Note:   Shaggy Talamantes  MRN: [id number]  :1935  Age:87 y.o.    HPI: Shaggy Talamantes is a 81 y.o. male who came to the ER on 10/18/22 via EMS with fatigue and increased urinary frequency. He was tachycardiac and met sepsis criteria. Bld culture drawn  IV Zosyn initiated      In ER WBC and LFTs normal  Creatinine 3.28 on admission 10/18/22    He tales daily aspirin  No acid reduction therapy. He described falling 2 weeks prior  He doesn't think he was taking NSAIDs after the fall   He is followed by Dr Christy Jules at the [facility] center for a right elbow traumatic abrasions after a prior fall (Last seen on 10/14/22)      Imaging in ER identified right acetabular fracture and ramus fracture  CT as below with RUQ inflamm changes  Gen Surgery was consulted            10/18/22 CT chest/abd/pelvis without contrast  Hx: Trauma sustained in fall 2 weeks ago. Back pain and hip pain      FINDINGS:   -LUNGS: No pneumothorax. No pulmonary contusion. Bronchiectasis and peribronchial thickening with some consolidation in the right upper lobe which appears chronic. -MEDIASTINUM/AXILLA: No mediastinal hematoma. No gross adenopathy. -HEART/VESSELS: Heart appears anemic. -CHEST WALL/BONES: No gross rib fractures. -STERNUM: No gross fracture. -LIVER: Uniform on this noncontrast exam.   -GALLBLADDER/BILE DUCTS: No gallstones or bile duct dilation. -PANCREAS: Atrophic.   -SPLEEN: Uniform on this noncontrast exam.     -ADRENALS: Left adrenal is enlarged, 3.4 cm.   -KIDNEYS/URETERS: 3.5 cm and 4.0 cm cysts left kidney and 3.7 cm cyst right kidney. -BLADDER: Quite distended. -REPRODUCTIVE ORGANS: Prostate is enlarged. There is stranding densities in the right spermatic cord which track up into the abdomen. -BOWEL: Normal caliber. -LYMPH NODES: No significant retroperitoneal, mesenteric, or pelvic adenopathy. -BONES: Fracture right inferior pubic ramus.  There is some callus formation. Fracture anterior lip of the right acetabulum. Moderate facet arthropathy throughout the lumbar spine. -VASCULATURE: Aorta is densely calcified. -OTHER: There is stranding densities in the RUQ adjacent to the gallbladder and 1st and 2nd portion of the duodenum and in Morison's pouch. This tracks inferiorly. Exam limited due to lack of intravenous contrast.     Impression:  1) Nondisplaced fracture anterior lip right acetabulum and fracture inferior pubic ramus on the right with small amount of callus formation. 2) Stranding densities in the right upper quadrant, near the neck the gallbladder, first and second portion of the duodenum and in Morison's pouch. This could be acute inflammation or posttraumatic hemorrhage. 3) Stranding densities tract along the right abdomen down into the spermatic cord and upper scrotum on the right, inflammatory versus posttraumatic. 4) A 3.4 cm left adrenal mass, neoplasia versus adrenal hemorrhage. 5) Urinary bladder is quite distended.          Additional hx:  10/19/22 Feels better; denies abd pain; AF/VSS; WBC normal; ortho appears to recommend non-operative Rx; Creatine 3.28-->2.03 this am with IVF hydration and haines decompression; Urology consulted  10/20/22 Denies RUQ pain; AF; WBC 7.6k              Past Medical History:   Diagnosis Date    Acute kidney injury ([address] 1/25/2022    Cellulitis of forearm, right 4/21/2022    Diabetes ([address]     Gastroenteritis 9/1/2021    Hyperkalemia 1/25/2022    Hypertension      Past Surgical History:   Procedure Laterality Date    FRACTURE SURGERY       Current Facility-Administered Medications   Medication Dose Route Frequency    sodium zirconium cyclosilicate (LOKELMA) oral suspension 5 g  5 g Oral Every Other Day    potassium chloride (KLOR-CON M) extended release tablet 40 mEq  40 mEq Oral PRN    Or    potassium bicarb-citric acid (EFFER-K) effervescent tablet 40 mEq  40 mEq Oral PRN    Or potassium chloride 10 mEq/100 mL IVPB (Peripheral Line)  10 mEq IntraVENous PRN    magnesium sulfate 2000 mg in 50 mL IVPB premix  2,000 mg IntraVENous PRN    sodium phosphate 10 mmol in sodium chloride 0.9 % 250 mL IVPB  10 mmol IntraVENous PRN    Or    sodium phosphate 15 mmol in sodium chloride 0.9 % 250 mL IVPB  15 mmol IntraVENous PRN    Or    sodium phosphate 20 mmol in sodium chloride 0.9 % 500 mL IVPB  20 mmol IntraVENous PRN    pantoprazole (PROTONIX) 40 mg in sodium chloride (PF) 10 mL injection  40 mg IntraVENous Daily    sodium chloride flush 0.9 % injection 5-40 mL  5-40 mL IntraVENous 2 times per day    sodium chloride flush 0.9 % injection 5-40 mL  5-40 mL IntraVENous PRN    0.9 % sodium chloride infusion   IntraVENous PRN    ondansetron (ZOFRAN-ODT) disintegrating tablet 4 mg  4 mg Oral Q8H PRN    Or    ondansetron (ZOFRAN) injection 4 mg  4 mg IntraVENous Q6H PRN    acetaminophen (TYLENOL) tablet 650 mg  650 mg Oral Q6H PRN    Or    acetaminophen (TYLENOL) suppository 650 mg  650 mg Rectal Q6H PRN     ALLERGIES:  Patient has no known allergies. Social History     Socioeconomic History    Marital status:      Spouse name: None    Number of children: None    Years of education: None    Highest education level: None   Tobacco Use    Smoking status: Never    Smokeless tobacco: Never   Substance and Sexual Activity    Alcohol use: No    Drug use: No     Social History     Tobacco Use   Smoking Status Never   Smokeless Tobacco Never     History reviewed. No pertinent family history. ROS: The patient has no difficulty with chest pain or shortness of breath. No fever or chills. Comprehensive review of systems was otherwise unremarkable except as noted above.     Physical Exam:   BP (!) 115/56   Pulse 89   Temp (!) 96.7 °F (35.9 °C) (Oral)   Resp 17   Ht 6' 1\" (1.854 m)   Wt 142 lb (64.4 kg)   SpO2 97%   BMI 18.73 kg/m²   Vitals:    10/20/22 0739 10/20/22 0800 10/20/22 0832 10/20/22 0900 BP:       Pulse: (!) 103 (!) 106 (!) 101 89   Resp: 19 18 19 17   Temp:       TempSrc:       SpO2: 99% 96% 96% 97%   Weight:       Height:         10/20 0701 - 10/20 1900  In: 500 [P.O.:500]  Out: 600 [Urine:600]  10/18 1901 - 10/20 0700  In: 3240.9 [P.O.:400; I.V.:2840.9]  Out: 9094 [Urine:3975]    Constitutional: Alert, oriented, cooperative patient in no acute distress; appears stated age    Eyes:Sclera are clear. EOMs intact  ENMT: no external lesions; +difficulty hearing; no obvious neck masses, no ear or lip lesions, nares normal  CV: RRR. Normal perfusion  Resp: No JVD. Breathing is  non-labored; no audible wheezing. GI: soft and non-distended; non-tender     Musculoskeletal: unremarkable with normal function. No embolic signs or cyanosis.    Neuro:  Oriented; moves all 4; no focal deficits  Psychiatric: normal affect and mood, no memory impairment    Recent vitals (if inpt):  Patient Vitals for the past 24 hrs:   BP Temp Temp src Pulse Resp SpO2   10/20/22 0900 -- -- -- 89 17 97 %   10/20/22 0832 -- -- -- (!) 101 19 96 %   10/20/22 0800 -- -- -- (!) 106 18 96 %   10/20/22 0739 -- -- -- (!) 103 19 99 %   10/20/22 0732 (!) 115/56 (!) 96.7 °F (35.9 °C) Oral 96 25 100 %   10/20/22 0700 (!) 115/56 -- -- 84 18 99 %   10/20/22 0600 (!) 118/55 -- -- 82 15 98 %   10/20/22 0500 (!) 119/49 -- -- 81 17 95 %   10/20/22 0400 (!) 118/59 -- -- 80 17 97 %   10/20/22 0300 118/61 98.9 °F (37.2 °C) Axillary 76 17 98 %   10/20/22 0200 (!) 109/54 -- -- 80 15 95 %   10/20/22 0100 (!) 113/58 -- -- 79 16 94 %   10/20/22 0000 (!) 120/58 98.5 °F (36.9 °C) Axillary 80 15 97 %   10/19/22 2300 121/65 -- -- 77 17 96 %   10/19/22 2200 121/67 -- -- 86 20 96 %   10/19/22 2155 -- -- -- 84 17 98 %   10/19/22 2100 (!) 126/42 -- -- 84 17 98 %   10/19/22 2000 (!) 135/97 97.8 °F (36.6 °C) Oral 80 20 96 %   10/19/22 1900 126/72 -- -- 86 16 98 %   10/19/22 1800 112/63 -- -- 77 17 98 %   10/19/22 1700 (!) 111/56 -- -- 91 18 98 %   10/19/22 1600 (!) 112/58 -- -- 86 15 96 %   10/19/22 1500 (!) 128/58 98 °F (36.7 °C) Temporal 87 15 97 %   10/19/22 1400 123/62 -- -- 84 14 98 %   10/19/22 1300 125/60 -- -- 89 19 98 %   10/19/22 1200 135/62 -- -- 89 17 98 %       Amount and/or Complexity of Data Reviewed and Analyzed:  I reviewed and analyzed all of the unique labs and radiologic studies that are shown below as well as any that are in the HPI, and any that are in the expanded problem list below  *Each unique test, order, or document contributes to the combination of 2 or combination of 3 in Category 1 below. For this visit I also reviewed old records and prior notes. Recent Labs     10/20/22  0317   WBC 7.6   HGB 8.7*         K 5.3*   *   CO2 24   BUN 40*   ALT 14     Review of most recent CBC  Lab Results   Component Value Date    WBC 7.6 10/20/2022    HGB 8.7 (L) 10/20/2022    HCT 27.8 (L) 10/20/2022    MCV 96.9 10/20/2022     10/20/2022       Review of most recent BMP  Lab Results   Component Value Date/Time     10/20/2022 03:17 AM    K 5.3 10/20/2022 03:17 AM     10/20/2022 03:17 AM    CO2 24 10/20/2022 03:17 AM    BUN 40 10/20/2022 03:17 AM    CREATININE 1.18 10/20/2022 03:17 AM    GLUCOSE 198 10/20/2022 03:17 AM    CALCIUM 8.5 10/20/2022 03:17 AM        Review of most recent LFTs (and lipase if done)  Lab Results   Component Value Date    ALT 14 10/20/2022    AST 14 (L) 10/20/2022    ALKPHOS 74 10/20/2022    BILITOT 0.3 10/20/2022     No results found for: LIPASE    No results found for: INR, APTT, LCAD    Review of most recent HgbA1c  Hemoglobin A1C   Date Value Ref Range Status   10/20/2022 7.5 (H) 4.8 - 5.6 % Final       Nutritional assessment screen for wound healing issues:  Lab Results   Component Value Date    LABALBU 2.5 (L) 10/20/2022       @lastcovr@    Xray Result (most recent):  XR ELBOW RIGHT (MIN 3 VIEWS) 10/18/2022    Narrative  RIGHT ELBOW 3 views.     INDICATION:  Elbow pain following injury sustained in fall 2 weeks ago. .    TECHNIQUE: AP and lateral and oblique views. COMPARISON: None. FINDINGS: The radial head appears intact. No abnormal fat pad signs. No fracture  or dislocation. There are osteophytes off the radial head. Impression  Negative for acute fracture. Osteoarthritis. CT Result (most recent):  CT CHEST ABDOMEN PELVIS WO CONTRAST 10/18/2022    Narrative  CT OF THE CHEST ABDOMEN AND PELVIS without intravenous and without oral  contrast.    INDICATION: Trauma sustained in fall 2 weeks ago. Back pain and hip pain    TECHNIQUE:  Multiple axial images were obtained through the chest, abdomen and  pelvis. Radiation dose reduction techniques were used for this study. All CT  scans performed at this facility use one or all of the following: Automated  exposure control, adjustment of the mA and/or kVp according to patient's size,  iterative reconstruction. COMPARISON: None    FINDINGS:  -LUNGS:  No pneumothorax. No pulmonary contusion. Bronchiectasis and peribronchial thickening with some consolidation in the right  upper lobe which appears chronic. -MEDIASTINUM/AXILLA: No mediastinal hematoma. No gross adenopathy. -HEART/VESSELS: Heart appears anemic. -CHEST WALL/BONES: No gross rib fractures. -STERNUM: No gross fracture. -LIVER: Uniform on this noncontrast exam.  -GALLBLADDER/BILE DUCTS: No gallstones or bile duct dilation. -PANCREAS: Atrophic.  -SPLEEN: Uniform on this noncontrast exam.    -ADRENALS: Left adrenal is enlarged, 3.4 cm.  -KIDNEYS/URETERS: 3.5 cm and 4.0 cm cysts left kidney and 3.7 cm cyst right  kidney. -BLADDER: Quite distended. -REPRODUCTIVE ORGANS: Prostate is enlarged. There is stranding densities in the right spermatic cord which track up into the  abdomen. -BOWEL: Normal caliber. -LYMPH NODES: No significant retroperitoneal, mesenteric, or pelvic adenopathy. -BONES:  Fracture right inferior pubic ramus.  There is some callus formation. Fracture anterior lip of the right acetabulum. Moderate facet arthropathy throughout the lumbar spine. -VASCULATURE: Aorta is densely calcified. -OTHER: There is stranding densities in the right upper quadrant adjacent to the  gallbladder and first and second portion of the duodenum and in Morison's pouch. This tracks inferiorly. Impression  Exam limited due to lack of intravenous contrast.  1) Nondisplaced fracture anterior lip right acetabulum and fracture inferior  pubic ramus on the right with small amount of callus formation. 2) Stranding densities in the right upper quadrant, near the neck the  gallbladder, first and second portion of the duodenum and in Morison's pouch. This could be acute inflammation or posttraumatic hemorrhage. 3) Stranding densities tract along the right abdomen down into the spermatic  cord and upper scrotum on the right, inflammatory versus posttraumatic. 4) A 3.4 cm left adrenal mass, neoplasia versus adrenal hemorrhage. 5) Urinary bladder is quite distended. US Result (most recent):  No results found for this or any previous visit from the past 3650 days.       Admission date (for inpatients): 10/18/2022   * No surgery found *  * No surgery found *        ASSESSMENT/PLAN:  [unfilled]  Principal Problem:    Sepsis with acute renal failure and tubular necrosis (HCC)  Active Problems:    Skin tear of elbow without complication    Abnormal CT abd with RUQ inflammation    Closed nondisplaced fracture of anterior wall of right acetabulum (HCC)    Inferior pubic ramus fracture, right, closed, initial encounter ([facility]    Acute renal failure with acute tubular necrosis superimposed on stage 3a chronic kidney disease (HCC)    Fall    Contusion of abdominal wall    Generalized weakness    Closed nondisplaced fracture of anterior wall of right acetabulum with delayed healing    Urinary retention    Hyperlipidemia    Gout    Chronic kidney disease, stage 3a ([address] 3.4cm left adrenal mass noted incidentally on CT 10/18/22    Normocytic anemia    Primary hypertension    Diabetes mellitus type 2, controlled ([address]  Resolved Problems:    * No resolved hospital problems. *     Patient Active Problem List    Diagnosis Date Noted    Sepsis with acute renal failure and tubular necrosis ([address] 10/19/2022     Priority: High    3.4cm left adrenal mass noted incidentally on CT 10/18/22 10/19/2022     Priority: Medium    Abnormal CT abd with RUQ inflammation 10/18/2022     Priority: Medium    Closed nondisplaced fracture of anterior wall of right acetabulum ([address] 10/18/2022     Priority: Medium    Inferior pubic ramus fracture, right, closed, initial encounter ([address] 10/18/2022     Priority: Medium    Acute renal failure with acute tubular necrosis superimposed on stage 3a chronic kidney disease ([address] 10/18/2022     Priority: Medium    Fall 10/18/2022     Priority: Medium    Contusion of abdominal wall 10/18/2022     Priority: Medium    Generalized weakness 10/18/2022     Priority: Medium    Closed nondisplaced fracture of anterior wall of right acetabulum with delayed healing 10/18/2022     Priority: Medium    Urinary retention 10/18/2022     Priority: Medium    Skin tear of elbow without complication 29/16/6768     Priority: Medium    Pain in right hip 10/07/2022     Priority: Medium    Chronic kidney disease, stage 3a ([address] 03/23/2022     Priority: Medium     sCr: ~1.3mg/dL in the setting of age related decline, and history of HTN/DM. Urinary incontinence 01/12/2022     Priority: Medium    Unilateral primary osteoarthritis of first carpometacarpal joint, unspecified hand 09/01/2021     Priority: Medium    Lumbosacral radiculopathy 09/01/2021     Priority: Medium    Hyperlipidemia 09/01/2021     Priority: Medium    Gout 09/01/2021     Priority: Medium     gout attack 5/2022 with elevated uric acid. Allopurinol 50mg daily.       Elevated PSA 09/01/2021     Priority: Medium    Eczema 09/01/2021     Priority: Medium    Actinic keratosis 09/01/2021     Priority: Medium    Normocytic anemia 01/26/2022    Primary hypertension 01/25/2022    Diabetes mellitus type 2, controlled ([facility] [address] 01/25/2022          Number and Complexity of Problems addressed and   Risks of complications and/or morbidity of management        Right acetabular and inferior rami fracture  Ortho consulted and appears they are recommending non-operative Rx      Acute renal failure with Urinary retention  with bladder distention on CT and increased frequency prior to admission  Aguilar-->gravity  Hydrate  Follow BMP daily  Creatinine 3.28-->2.03 --->1.18 by 10/20/22 with IV hydration and Aguilar decompression  Urology consulted    RUQ inflammation on CT  Possibly duodenitis (possible NSAID use after recent fall)  Maalox x 1 on 10/19/22 and IV Protonix resulted in resolution of RUQ pain  Recommend Protonix daily while inpt  Prilosec 20mg qday outpt for 1 month  No EGD unless symptoms recur  Recommend GI follow-up if RUQ pain recurs    Gen Surgery will sign off  Call if needed      3.4cm left Adrenal mass found incidentally  CT 10/18/22  Neoplasia vs hemorrhage per radiology  Outpt Endocrinology referral placed by Dr Alex Taylor on 10/19/22      Elbow contusions after prior trauma  Local wound care              Level of MDM (2/3 elements below)  Number and Complexity of Problems Addressed Amount and/or Complexity of Data to be Reviewed and Analyzed  *Each unique test, order, or document contributes to the combination of 2 or combination of 3 in Category 1 below. Risk of Complications and/or Morbidity or Mortality of pt Management     56722  51779 SF Minimal  ?1self-limited or minor problem Minimal or none Minimal risk of morbidity from additional diagnostic testing or Rx   21544  52854 Low Low  ? 2or more self-limited or minor problems;    or  ? 1stable chronic illness;    or  ?1acute, uncomplicated illness or injury   Limited  (Must meet the requirements of at least 1 of the 2 categories)  Category 1: Tests and documents   ? Any combination of 2 from the following:  ?Review of prior external note(s) from each unique source*;  ?review of the result(s) of each unique test*;   ?ordering of each unique test*    or   Category 2: Assessment requiring an independent historian(s)  (For the categories of independent interpretation of tests and discussion of management or test interpretation, see moderate or high) Low risk of morbidity from additional diagnostic testing or treatment     26005  42388 Mod Moderate  ? 1or more chronic illnesses with exacerbation, progression, or side effects of treatment;    or  ?2or more stable chronic illnesses;    or  ?1undiagnosed new problem with uncertain prognosis;    or  ?1acute illness with systemic symptoms;    or  ?1acute complicated injury   Moderate  (Must meet the requirements of at least 1 out of 3 categories)  Category 1: Tests, documents, or independent historian(s)  ? Any combination of 3 from the following:   ?Review of prior external note(s) from each unique source*;  ?Review of the result(s) of each unique test*;  ?Ordering of each unique test*;  ?Assessment requiring an independent historian(s)    or  Category 2: Independent interpretation of tests   ? Independent interpretation of a test performed by another physician/other qualified health care professional (not separately reported);     or  Category 3: Discussion of management or test interpretation  ? Discussion of management or test interpretation with external physician/other qualified health care professional/appropriate source (not separately reported)   Moderate risk of morbidity from additional diagnostic testing or treatment  Examples only:  ?Prescription drug management   ? Decision regarding minor surgery with identified patient or procedure risk factors  ? Decision regarding elective major surgery without identified patient or procedure risk factors ?Diagnosis or treatment significantly limited by social determinants of health       94443  87171 High High  ? 1or more chronic illnesses with severe exacerbation, progression, or side effects of treatment;    or  ?1 acute or chronic illness or injury that poses a threat to life or bodily function   Extensive  (Must meet the requirements of at least 2 out of 3 categories)  Category 1: Tests, documents, or independent historian(s)  ? Any combination of 3 from the following:   ?Review of prior external note(s) from each unique source*;  ?Review of the result(s) of each unique test*;   ?Ordering of each unique test*;   ?Assessment requiring an independent historian(s)    or   Category 2: Independent interpretation of tests   ? Independent interpretation of a test performed by another physician/other qualified health care professional (not separately reported);     or  Category 3: Discussion of management or test interpretation  ? Discussion of management or test interpretation with external physician/other qualified health care professional/appropriate source (not separately reported)   High risk of morbidity from additional diagnostic testing or treatment  Examples only:  ?Drug therapy requiring intensive monitoring for toxicity  ? Decision regarding elective major surgery with identified patient or procedure risk factors  ? Decision regarding emergency major surgery  ? Decision regarding hospitalization  ? Decision not to resuscitate or to de-escalate care because of poor prognosis             I have personally performed a face-to-face diagnostic evaluation and management  service on this patient. I have independently seen the patient. I have independently obtained the above history from the patient/family. I have independently examined the patient with above findings. I have independently reviewed data/labs for this patient and developed the above plan of care (MDM).       Signed: Judit France MD  10/20/2022    11:37 AM

## 2022-10-20 NOTE — CARE COORDINATION
0800: Discharge planning was discussed with the Critical Care Interdisciplinary Team. Per the physician, the patient is medically ready for discharge. He will require insurance pre-certification for short term rehabilitation verses acute inpatient rehabilitation. 2632: RN CM met with the patient and the physical therapist at the bedside and discussed the recommendation for short term rehabilitation verses acute rehabilitation. He was provided with a list of acute rehabilitation facilities and their quality metrics. He would like for his sister Nirmala Leon to make the decision and is willing to consider inpatient rehabilitation. RN CM informed him that he will require insurance approval. AKANKSHA FERNANDEZ called Nirmala Leon but was unable to reach her by telephone. 5: RN CM received a return phone call from Nirmala Leon. AKANKSHA FERNANDEZ discussed short term rehabilitation and inpatient rehabilitation facilities with her. The list of acute rehabilitation facilities was reviewed with her and she selected [facility]. A referral was sent to [facility] and is pending review. 3877: RN CM received a phone call from the patient's sister Nirmala Leon. She requested for case management to send a referral to [facility]. The referral was sent as requested. If they are unable to accept the patient for services, she would like to accept the bed offer at [place] [address] and Rehabilitation. 1210: RN CM met with the patient and his sister Nirmlaa Leon at the bedside. They confirmed they spoke with Dorita Beckett from [facility] and are interested in the facility. [facility] has not yet accepted the patient for services. 1409: RN CM received a phone call from Dorita Beckett with [facility]. The patient has been accepted for services and they have requested insurance precert.

## 2022-10-20 NOTE — PROGRESS NOTES
Hospitalist Progress Note   Admit Date:  10/18/2022 12:14 PM   Name:  Lopez Taylor   Age:  81 y.o. Sex:  male  :  1935   MRN:  [id number]   Room:  [facility]/    Presenting Complaint: Fatigue     Reason(s) for Admission: Generalized weakness [R53.1]  Closed nondisplaced fracture of anterior wall of right acetabulum, initial encounter ([facility] [S32.414A]  Abdominal trauma, initial encounter [S39.91XA]  Inferior pubic ramus fracture, right, closed, initial encounter ([facility] [address] [S32.591A]  Acute renal failure, unspecified acute renal failure type ([facility] [address] [N17.9]     Hospital Course:   81 y.o. male with medical history of  DM II, HTN, hyperkalemia secondary to bactrim use who presented with c/o generalized fatigue and increased urinary frequency. Pt reports a fall backwards down his brick stairs about 2 weeks ago and c/o abd pain since. Started having urinary incontinence last night. CT head neg for acute findings. CT c-spine neg for acute findings. CT chest/abd/pelvis with extensive findings of abdominal trauma (see full report below). Also found to have an right inferior pubic ramus fx and nondisplaced fx of the anterior wall of the right acetabulum. Ortho surgery (Dr. Landa Service) consulted per ER. General Surgery (Dr. Glenda Tipton) contacted per ER. BC sent. Also with TIGIST with creatinine at 3.28 baseline around 1.3. Pt hemodynamically stable. Denies CP, SOB, n/v/d. Subjective & 24hr Events (10/20/22): In good spirits today. Working with PT, OT. Going to need rehab. Renal now at baseline. Pain controlled. Awaiting insurance approval for [facility]. Assessment & Plan:   * Sepsis with acute renal failure and tubular necrosis (HCC)  Assessment & Plan  Admission criteria met with HR 96, RR 41, ruq CT findings.   -empiric antibiotics (pip-tazo)  -follow cultures  10/20/2022: cultures NGTD      Gout  Assessment & Plan  -hold allopurinol    Acute renal failure with acute tubular necrosis superimposed on stage 3a chronic kidney disease ([facility] [address]  Assessment & Plan  Responding appropriately to resuscitation.  -serial BMP  -IVF  -avoid nephrotoxic substances    Abnormal CT abd with RUQ inflammation  Assessment & Plan  Admission CT with \"stranding densities in the right upper quadrant adjacent to the gallbladder and first and second portion of the duodenum and in Morison's pouch. This tracks inferiorly. \"  -consult general surgery: concern for duodenitis  10/20/2022: continue conservative treatment    Chronic kidney disease, stage 3a ([facility] [address]  Assessment & Plan  Noted  -avoid nephrotoxic substances    Skin tear of elbow without complication  Assessment & Plan  -wound consult    Diabetes mellitus type 2, controlled ([facility] [address]  Assessment & Plan  -hold metformin, sitagliptin  -sliding scale insulin    3.4cm left adrenal mass noted incidentally on CT 10/18/22  Assessment & Plan  -outpatient follow up with urology    Hyperlipidemia  Assessment & Plan  -hold atorvastatin    Urinary retention  Assessment & Plan  -haines  10/20/2022: voiding trial 10/22 or 10/23, OP FU with urology    Generalized weakness  Assessment & Plan  -PT, OT, PPD    Fall  Assessment & Plan  -fall precuations    Closed nondisplaced fracture of anterior wall of right acetabulum [facility])  Assessment & Plan  -consult orthopedic surgery: conservative managment    Primary hypertension  Assessment & Plan  -amlodipine, furosemide              Anticipated discharge needs:      Short term rehab    Diet:  ADULT DIET;  Regular; 4 carb choices (60 gm/meal)  ADULT ORAL NUTRITION SUPPLEMENT; Breakfast, Lunch, Dinner; Diabetic Oral Supplement  DVT PPx: SCDs  Code status: Full Code    Hospital Problems:  Principal Problem:    Sepsis with acute renal failure and tubular necrosis (HCC)  Active Problems:    Abnormal CT abd with RUQ inflammation    Closed nondisplaced fracture of anterior wall of right acetabulum (HCC)    Inferior pubic ramus fracture, right, closed, initial encounter ([facility] [address] Acute renal failure with acute tubular necrosis superimposed on stage 3a chronic kidney disease ([address]    Fall    Contusion of abdominal wall    Generalized weakness    Closed nondisplaced fracture of anterior wall of right acetabulum with delayed healing    Urinary retention    Gout    3.4cm left adrenal mass noted incidentally on CT 10/18/22    Diabetes mellitus type 2, controlled ([address]    Skin tear of elbow without complication    Chronic kidney disease, stage 3a ([address]    Normocytic anemia    Primary hypertension    Hyperlipidemia  Resolved Problems:    * No resolved hospital problems.  *      Objective:   Patient Vitals for the past 24 hrs:   Temp Pulse Resp BP SpO2   10/20/22 1600 -- 90 14 (!) 147/59 95 %   10/20/22 1500 -- 81 21 (!) 111/54 97 %   10/20/22 1454 97.8 °F (36.6 °C) -- -- -- --   10/20/22 1420 -- 74 19 111/60 97 %   10/20/22 0900 -- 89 17 -- 97 %   10/20/22 0832 -- (!) 101 19 -- 96 %   10/20/22 0800 -- (!) 106 18 -- 96 %   10/20/22 0739 -- (!) 103 19 -- 99 %   10/20/22 0732 (!) 96.7 °F (35.9 °C) 96 25 (!) 115/56 100 %   10/20/22 0700 -- 84 18 (!) 115/56 99 %   10/20/22 0600 -- 82 15 (!) 118/55 98 %   10/20/22 0500 -- 81 17 (!) 119/49 95 %   10/20/22 0400 -- 80 17 (!) 118/59 97 %   10/20/22 0300 98.9 °F (37.2 °C) 76 17 118/61 98 %   10/20/22 0200 -- 80 15 (!) 109/54 95 %   10/20/22 0100 -- 79 16 (!) 113/58 94 %   10/20/22 0000 98.5 °F (36.9 °C) 80 15 (!) 120/58 97 %   10/19/22 2300 -- 77 17 121/65 96 %   10/19/22 2200 -- 86 20 121/67 96 %   10/19/22 2155 -- 84 17 -- 98 %   10/19/22 2100 -- 84 17 (!) 126/42 98 %   10/19/22 2000 97.8 °F (36.6 °C) 80 20 (!) 135/97 96 %   10/19/22 1900 -- 86 16 126/72 98 %         Oxygen Therapy  SpO2: 95 %  Pulse Oximetry Type: Continuous  Pulse via Oximetry: 88 beats per minute  SPO2 High Alarm Limit: 100  SPO2 Low Alarm Limit POX: 90  Pulse Oximeter Device Mode: Continuous  Pulse Oximeter Device Location: Right, Hand, Finger  O2 Device: None (Room air)  Oximetry Probe Site Changed: No  Skin Assessment: Clean, dry, & intact  Skin Protection for O2 Device: N/A  O2 Flow Rate (L/min): 0 L/min  Oxygen Therapy: Supplemental oxygen  O2 Delivery Method: Nasal cannula    Estimated body mass index is 18.73 kg/m² as calculated from the following:    Height as of this encounter: 6' 1\" (1.854 m). Weight as of this encounter: 142 lb (64.4 kg). Intake/Output Summary (Last 24 hours) at 10/20/2022 1819  Last data filed at 10/20/2022 1717  Gross per 24 hour   Intake 1907.26 ml   Output 1900 ml   Net 7.26 ml         Blood pressure (!) 147/59, pulse 90, temperature 97.8 °F (36.6 °C), temperature source Oral, resp. rate 14, height 6' 1\" (1.854 m), weight 142 lb (64.4 kg), SpO2 95 %. Physical Exam  Vitals and nursing note reviewed. Constitutional:       General: He is not in acute distress. Appearance: He is underweight. He is not ill-appearing or diaphoretic. HENT:      Head: Normocephalic and atraumatic. Eyes:      Extraocular Movements: Extraocular movements intact. Cardiovascular:      Rate and Rhythm: Normal rate. Pulmonary:      Effort: Pulmonary effort is normal. No respiratory distress. Abdominal:      General: There is no distension. Musculoskeletal:         General: No deformity. Skin:     Coloration: Skin is not jaundiced or pale. Neurological:      General: No focal deficit present. Mental Status: He is alert and oriented to person, place, and time. Mental status is at baseline. He is confused. Comments: Has contextual memory limitations   Psychiatric:         Mood and Affect: Mood normal.         Behavior: Behavior normal. Behavior is cooperative. Cognition and Memory: Cognition is impaired.       Comments: affable         I have personally reviewed labs and tests showing:  Recent Labs:  Recent Results (from the past 48 hour(s))   Urinalysis    Collection Time: 10/19/22 12:47 AM   Result Value Ref Range    Color, UA YELLOW/STRAW Appearance CLEAR      Specific Gravity, UA 1.017 1.001 - 1.023      pH, Urine 5.0 5.0 - 9.0      Protein, UA Negative NEG mg/dL    Glucose, UA Negative mg/dL    Ketones, Urine Negative NEG mg/dL    Bilirubin Urine Negative NEG      Blood, Urine Negative NEG      Urobilinogen, Urine 0.2 0.2 - 1.0 EU/dL    Nitrite, Urine Negative NEG      Leukocyte Esterase, Urine Negative NEG     CBC with Auto Differential    Collection Time: 10/19/22  3:05 AM   Result Value Ref Range    WBC 7.6 4.3 - 11.1 K/uL    RBC 2.89 (L) 4.23 - 5.6 M/uL    Hemoglobin 9.0 (L) 13.6 - 17.2 g/dL    Hematocrit 28.2 (L) 41.1 - 50.3 %    MCV 97.6 82.0 - 102.0 FL    MCH 31.1 26.1 - 32.9 PG    MCHC 31.9 31.4 - 35.0 g/dL    RDW 15.9 (H) 11.9 - 14.6 %    Platelets 267 539 - 098 K/uL    MPV 11.0 9.4 - 12.3 FL    nRBC 0.00 0.0 - 0.2 K/uL    Differential Type AUTOMATED      Seg Neutrophils 76 43 - 78 %    Lymphocytes 9 (L) 13 - 44 %    Monocytes 8 4.0 - 12.0 %    Eosinophils % 5 0.5 - 7.8 %    Basophils 1 0.0 - 2.0 %    Immature Granulocytes 2 0.0 - 5.0 %    Segs Absolute 5.8 1.7 - 8.2 K/UL    Absolute Lymph # 0.7 0.5 - 4.6 K/UL    Absolute Mono # 0.6 0.1 - 1.3 K/UL    Absolute Eos # 0.3 0.0 - 0.8 K/UL    Basophils Absolute 0.1 0.0 - 0.2 K/UL    Absolute Immature Granulocyte 0.2 0.0 - 0.5 K/UL   Comprehensive Metabolic Panel    Collection Time: 10/19/22  3:05 AM   Result Value Ref Range    Sodium 141 133 - 143 mmol/L    Potassium 4.7 3.5 - 5.1 mmol/L    Chloride 112 (H) 101 - 110 mmol/L    CO2 23 21 - 32 mmol/L    Anion Gap 6 2 - 11 mmol/L    Glucose 160 (H) 65 - 100 mg/dL    BUN 61 (H) 8 - 23 MG/DL    Creatinine 2.03 (H) 0.8 - 1.5 MG/DL    Est, Glom Filt Rate 31 (L) >60 ml/min/1.73m2    Calcium 8.2 (L) 8.3 - 10.4 MG/DL    Total Bilirubin 0.4 0.2 - 1.1 MG/DL    ALT 16 12 - 65 U/L    AST 17 15 - 37 U/L    Alk Phosphatase 79 50 - 136 U/L    Total Protein 5.5 (L) 6.3 - 8.2 g/dL    Albumin 2.7 (L) 3.2 - 4.6 g/dL    Globulin 2.8 2.8 - 4.5 g/dL    Albumin/Globulin Ratio 1.0 0.4 - 1.6     CK    Collection Time: 10/19/22  3:05 AM   Result Value Ref Range    Total  21 - 215 U/L   Procalcitonin    Collection Time: 10/19/22  3:05 AM   Result Value Ref Range    Procalcitonin 0.08 0.00 - 0.49 ng/mL   Hemoglobin and Hematocrit    Collection Time: 10/19/22  1:55 PM   Result Value Ref Range    Hemoglobin 10.0 (L) 13.6 - 17.2 g/dL    Hematocrit 31.9 (L) 41.1 - 50.3 %   Hemoglobin and Hematocrit    Collection Time: 10/19/22 10:01 PM   Result Value Ref Range    Hemoglobin 8.9 (L) 13.6 - 17.2 g/dL    Hematocrit 28.0 (L) 41.1 - 50.3 %   PLEASE READ & DOCUMENT PPD TEST IN 24 HRS    Collection Time: 10/19/22 10:26 PM   Result Value Ref Range    PPD, (POC) Negative Negative    mm Induration 0 0 - 5 mm   CBC with Auto Differential    Collection Time: 10/20/22  3:17 AM   Result Value Ref Range    WBC 7.6 4.3 - 11.1 K/uL    RBC 2.87 (L) 4.23 - 5.6 M/uL    Hemoglobin 8.7 (L) 13.6 - 17.2 g/dL    Hematocrit 27.8 (L) 41.1 - 50.3 %    MCV 96.9 82.0 - 102.0 FL    MCH 30.3 26.1 - 32.9 PG    MCHC 31.3 (L) 31.4 - 35.0 g/dL    RDW 15.7 (H) 11.9 - 14.6 %    Platelets 430 089 - 522 K/uL    MPV 10.7 9.4 - 12.3 FL    nRBC 0.00 0.0 - 0.2 K/uL    Differential Type AUTOMATED      Seg Neutrophils 73 43 - 78 %    Lymphocytes 10 (L) 13 - 44 %    Monocytes 9 4.0 - 12.0 %    Eosinophils % 4 0.5 - 7.8 %    Basophils 1 0.0 - 2.0 %    Immature Granulocytes 3 0.0 - 5.0 %    Segs Absolute 5.6 1.7 - 8.2 K/UL    Absolute Lymph # 0.8 0.5 - 4.6 K/UL    Absolute Mono # 0.7 0.1 - 1.3 K/UL    Absolute Eos # 0.3 0.0 - 0.8 K/UL    Basophils Absolute 0.1 0.0 - 0.2 K/UL    Absolute Immature Granulocyte 0.2 0.0 - 0.5 K/UL   Phosphorus    Collection Time: 10/20/22  3:17 AM   Result Value Ref Range    Phosphorus 2.9 2.3 - 3.7 MG/DL   TSH with Reflex    Collection Time: 10/20/22  3:17 AM   Result Value Ref Range    TSH w Free Thyroid if Abnormal 2.25 0.358 - 3.740 UIU/ML   Hemoglobin A1C    Collection Time: 10/20/22  3:17 AM Result Value Ref Range    Hemoglobin A1C 7.5 (H) 4.8 - 5.6 %    eAG 169 mg/dL   Comprehensive Metabolic Panel w/ Reflex to MG    Collection Time: 10/20/22  3:17 AM   Result Value Ref Range    Sodium 142 133 - 143 mmol/L    Potassium 5.3 (H) 3.5 - 5.1 mmol/L    Chloride 113 (H) 101 - 110 mmol/L    CO2 24 21 - 32 mmol/L    Anion Gap 5 2 - 11 mmol/L    Glucose 198 (H) 65 - 100 mg/dL    BUN 40 (H) 8 - 23 MG/DL    Creatinine 1.18 0.8 - 1.5 MG/DL    Est, Glom Filt Rate >60 >60 ml/min/1.73m2    Calcium 8.5 8.3 - 10.4 MG/DL    Total Bilirubin 0.3 0.2 - 1.1 MG/DL    ALT 14 12 - 65 U/L    AST 14 (L) 15 - 37 U/L    Alk Phosphatase 74 50 - 136 U/L    Total Protein 5.3 (L) 6.3 - 8.2 g/dL    Albumin 2.5 (L) 3.2 - 4.6 g/dL    Globulin 2.8 2.8 - 4.5 g/dL    Albumin/Globulin Ratio 0.9 0.4 - 1.6         I have personally reviewed imaging studies showing: Other Studies:  CT Head W/O Contrast   Final Result   Negative for acute intracranial abnormality. Chronic changes. CT CSpine W/O Contrast   Final Result   1) Negative for acute cervical spine fracture. 2) Facet arthropathy and spondylosis and carotid atherosclerosis. 3) Chronic bronchiectasis and peribronchial thickening and chronic inflammatory   change right lung apex. CT CHEST ABDOMEN PELVIS WO CONTRAST Additional Contrast? None   Final Result   Exam limited due to lack of intravenous contrast.    1) Nondisplaced fracture anterior lip right acetabulum and fracture inferior   pubic ramus on the right with small amount of callus formation. 2) Stranding densities in the right upper quadrant, near the neck the   gallbladder, first and second portion of the duodenum and in Morison's pouch. This could be acute inflammation or posttraumatic hemorrhage. 3) Stranding densities tract along the right abdomen down into the spermatic   cord and upper scrotum on the right, inflammatory versus posttraumatic.    4) A 3.4 cm left adrenal mass, neoplasia versus adrenal hemorrhage. 5) Urinary bladder is quite distended. XR ELBOW RIGHT (MIN 3 VIEWS)   Final Result   Negative for acute fracture. Osteoarthritis.              Current Meds:  Current Facility-Administered Medications   Medication Dose Route Frequency    sodium zirconium cyclosilicate (LOKELMA) oral suspension 5 g  5 g Oral Every Other Day    potassium chloride (KLOR-CON M) extended release tablet 40 mEq  40 mEq Oral PRN    Or    potassium bicarb-citric acid (EFFER-K) effervescent tablet 40 mEq  40 mEq Oral PRN    Or    potassium chloride 10 mEq/100 mL IVPB (Peripheral Line)  10 mEq IntraVENous PRN    magnesium sulfate 2000 mg in 50 mL IVPB premix  2,000 mg IntraVENous PRN    sodium phosphate 10 mmol in sodium chloride 0.9 % 250 mL IVPB  10 mmol IntraVENous PRN    Or    sodium phosphate 15 mmol in sodium chloride 0.9 % 250 mL IVPB  15 mmol IntraVENous PRN    Or    sodium phosphate 20 mmol in sodium chloride 0.9 % 500 mL IVPB  20 mmol IntraVENous PRN    pantoprazole (PROTONIX) 40 mg in sodium chloride (PF) 10 mL injection  40 mg IntraVENous Daily    sodium chloride flush 0.9 % injection 5-40 mL  5-40 mL IntraVENous 2 times per day    sodium chloride flush 0.9 % injection 5-40 mL  5-40 mL IntraVENous PRN    0.9 % sodium chloride infusion   IntraVENous PRN    ondansetron (ZOFRAN-ODT) disintegrating tablet 4 mg  4 mg Oral Q8H PRN    Or    ondansetron (ZOFRAN) injection 4 mg  4 mg IntraVENous Q6H PRN    acetaminophen (TYLENOL) tablet 650 mg  650 mg Oral Q6H PRN    Or    acetaminophen (TYLENOL) suppository 650 mg  650 mg Rectal Q6H PRN       Signed:  Celi Chao MD

## 2022-10-21 LAB
ALBUMIN SERPL-MCNC: 2.5 G/DL (ref 3.2–4.6)
ALBUMIN/GLOB SERPL: 0.7 {RATIO} (ref 0.4–1.6)
ALP SERPL-CCNC: 75 U/L (ref 50–136)
ALT SERPL-CCNC: 15 U/L (ref 12–65)
ANION GAP SERPL CALC-SCNC: 4 MMOL/L (ref 2–11)
AST SERPL-CCNC: 12 U/L (ref 15–37)
BASOPHILS # BLD: 0.1 K/UL (ref 0–0.2)
BASOPHILS NFR BLD: 1 % (ref 0–2)
BILIRUB SERPL-MCNC: 0.4 MG/DL (ref 0.2–1.1)
BUN SERPL-MCNC: 30 MG/DL (ref 8–23)
CALCIUM SERPL-MCNC: 8.7 MG/DL (ref 8.3–10.4)
CHLORIDE SERPL-SCNC: 108 MMOL/L (ref 101–110)
CO2 SERPL-SCNC: 27 MMOL/L (ref 21–32)
CREAT SERPL-MCNC: 1 MG/DL (ref 0.8–1.5)
DIFFERENTIAL METHOD BLD: ABNORMAL
EOSINOPHIL # BLD: 0.4 K/UL (ref 0–0.8)
EOSINOPHIL NFR BLD: 6 % (ref 0.5–7.8)
ERYTHROCYTE [DISTWIDTH] IN BLOOD BY AUTOMATED COUNT: 15.7 % (ref 11.9–14.6)
GLOBULIN SER CALC-MCNC: 3.5 G/DL (ref 2.8–4.5)
GLUCOSE SERPL-MCNC: 192 MG/DL (ref 65–100)
HCT VFR BLD AUTO: 28.5 % (ref 41.1–50.3)
HGB BLD-MCNC: 8.9 G/DL (ref 13.6–17.2)
IMM GRANULOCYTES # BLD AUTO: 0.3 K/UL (ref 0–0.5)
IMM GRANULOCYTES NFR BLD AUTO: 4 % (ref 0–5)
LYMPHOCYTES # BLD: 1 K/UL (ref 0.5–4.6)
LYMPHOCYTES NFR BLD: 15 % (ref 13–44)
MCH RBC QN AUTO: 29.8 PG (ref 26.1–32.9)
MCHC RBC AUTO-ENTMCNC: 31.2 G/DL (ref 31.4–35)
MCV RBC AUTO: 95.3 FL (ref 82–102)
MM INDURATION, POC: 0 MM (ref 0–5)
MONOCYTES # BLD: 0.6 K/UL (ref 0.1–1.3)
MONOCYTES NFR BLD: 9 % (ref 4–12)
NEUTS SEG # BLD: 4.3 K/UL (ref 1.7–8.2)
NEUTS SEG NFR BLD: 65 % (ref 43–78)
NRBC # BLD: 0 K/UL (ref 0–0.2)
PHOSPHATE SERPL-MCNC: 3.1 MG/DL (ref 2.3–3.7)
PLATELET # BLD AUTO: 189 K/UL (ref 150–450)
PMV BLD AUTO: 10.2 FL (ref 9.4–12.3)
POTASSIUM SERPL-SCNC: 4.9 MMOL/L (ref 3.5–5.1)
PPD, POC: NEGATIVE
PROT SERPL-MCNC: 6 G/DL (ref 6.3–8.2)
RBC # BLD AUTO: 2.99 M/UL (ref 4.23–5.6)
SODIUM SERPL-SCNC: 139 MMOL/L (ref 133–143)
WBC # BLD AUTO: 6.7 K/UL (ref 4.3–11.1)

## 2022-10-21 PROCEDURE — A4216 STERILE WATER/SALINE, 10 ML: HCPCS | Performed by: SURGERY

## 2022-10-21 PROCEDURE — 2580000003 HC RX 258: Performed by: SURGERY

## 2022-10-21 PROCEDURE — 80053 COMPREHEN METABOLIC PANEL: CPT

## 2022-10-21 PROCEDURE — C9113 INJ PANTOPRAZOLE SODIUM, VIA: HCPCS | Performed by: SURGERY

## 2022-10-21 PROCEDURE — 85025 COMPLETE CBC W/AUTO DIFF WBC: CPT

## 2022-10-21 PROCEDURE — 36415 COLL VENOUS BLD VENIPUNCTURE: CPT

## 2022-10-21 PROCEDURE — 97530 THERAPEUTIC ACTIVITIES: CPT

## 2022-10-21 PROCEDURE — 6360000002 HC RX W HCPCS: Performed by: SURGERY

## 2022-10-21 PROCEDURE — 2580000003 HC RX 258: Performed by: NURSE PRACTITIONER

## 2022-10-21 PROCEDURE — 1100000000 HC RM PRIVATE

## 2022-10-21 PROCEDURE — 84100 ASSAY OF PHOSPHORUS: CPT

## 2022-10-21 PROCEDURE — 6370000000 HC RX 637 (ALT 250 FOR IP): Performed by: INTERNAL MEDICINE

## 2022-10-21 RX ORDER — TAMSULOSIN HYDROCHLORIDE 0.4 MG/1
0.4 CAPSULE ORAL DAILY
Status: DISCONTINUED | OUTPATIENT
Start: 2022-10-21 | End: 2022-10-25 | Stop reason: HOSPADM

## 2022-10-21 RX ORDER — ALLOPURINOL 100 MG/1
50 TABLET ORAL DAILY
Status: DISCONTINUED | OUTPATIENT
Start: 2022-10-21 | End: 2022-10-25 | Stop reason: HOSPADM

## 2022-10-21 RX ADMIN — SODIUM CHLORIDE, PRESERVATIVE FREE 10 ML: 5 INJECTION INTRAVENOUS at 21:05

## 2022-10-21 RX ADMIN — ALLOPURINOL 50 MG: 100 TABLET ORAL at 13:58

## 2022-10-21 RX ADMIN — SODIUM CHLORIDE, PRESERVATIVE FREE 10 ML: 5 INJECTION INTRAVENOUS at 08:36

## 2022-10-21 RX ADMIN — SODIUM CHLORIDE 40 MG: 9 INJECTION, SOLUTION INTRAMUSCULAR; INTRAVENOUS; SUBCUTANEOUS at 08:35

## 2022-10-21 RX ADMIN — TAMSULOSIN HYDROCHLORIDE 0.4 MG: 0.4 CAPSULE ORAL at 13:58

## 2022-10-21 ASSESSMENT — PAIN DESCRIPTION - ORIENTATION: ORIENTATION: RIGHT

## 2022-10-21 ASSESSMENT — PAIN SCALES - GENERAL
PAINLEVEL_OUTOF10: 3
PAINLEVEL_OUTOF10: 0

## 2022-10-21 ASSESSMENT — PAIN SCALES - WONG BAKER: WONGBAKER_NUMERICALRESPONSE: 0

## 2022-10-21 ASSESSMENT — PAIN DESCRIPTION - LOCATION: LOCATION: HIP

## 2022-10-21 ASSESSMENT — PAIN - FUNCTIONAL ASSESSMENT: PAIN_FUNCTIONAL_ASSESSMENT: ACTIVITIES ARE NOT PREVENTED

## 2022-10-21 NOTE — PROGRESS NOTES
ACUTE PHYSICAL THERAPY GOALS:   (Developed with and agreed upon by patient and/or caregiver.)  DISCHARGE GOALS :  (1.)Mr. Melissa Mariscal will move from supine to sit and sit to supine  with STAND BY ASSIST.   (2.)Mr. Melissa Mariscal will transfer from bed to chair and chair to bed with STAND BY ASSIST using a walker. (3.)Mr. Melissa Mariscal will ambulate with STAND BY ASSIST for 300 feet with a rolling walker. 4) pt able to go up & down 5 steps with rail & CGA, device PRN. _____________________________     PHYSICAL THERAPY Daily Note and AM  (Link to Caseload Tracking: PT Visit Days : 3  Acknowledge Orders  Time In/Out  PT Charge Capture  Rehab Caseload Tracker    Starla Mann is a 81 y.o. male   PRIMARY DIAGNOSIS: Sepsis with acute renal failure and tubular necrosis (HCC)  Generalized weakness [R53.1]  Closed nondisplaced fracture of anterior wall of right acetabulum, initial encounter ([facility] [address] [S32.414A]  Abdominal trauma, initial encounter [S39.91XA]  Inferior pubic ramus fracture, right, closed, initial encounter ([address] [S32.591A]  Acute renal failure, unspecified acute renal failure type ([address] [N17.9]       Reason for Referral: Generalized Muscle Weakness (M62.81)  Other lack of cordination (R27.8)  Difficulty in walking, Not elsewhere classified (R26.2)  History of falling (Z91.81)  Inpatient: Payor: Matteo Viveros / Plan: [employer] G[employer]US HMO / Product Type: *No Product type* /     ASSESSMENT:     REHAB RECOMMENDATIONS:   Recommendation to date pending progress:  Setting:  Inpatient [facility]  If not approved by insurance, then STR is next recommendation. Since pt lives independently and still works, then [facility] would be an excellent option for intensive therapy    Equipment:    To Be Determined     ASSESSMENT:  Mr. Melissa Mariscal presents in no distress, pleasant & cooperative, participated very well with therapy.  This pt is much more limited & functioning below his reported baseline since his right acetabulum & inferior pubic rami Fx due to a fall at home. This pt is completely unsafe to return home alone, but could benefit from aggressive in-pt post acute therapy at an inpt rehab facility on hospital DC. MSW/CM aware of DC needs. He is a little hard of hearing even though he wears hearing aides. This am, he continues to work on   [address] Still requires min assist for supine to sit, but once up, he transfers and ambulates with CGA. He is a little unsteady. Increased his amb distance to 100' today.  Awaiting insur precert for transfer to [facility] rehab     [address] Mobility Inpatient   How much difficulty turning over in bed?: A Little  How much difficulty sitting down on / standing up from a chair with arms?: A Little  How much difficulty moving from lying on back to sitting on side of bed?: A Little  How much help from another person moving to and from a bed to a chair?: A Little  How much help from another person needed to walk in hospital room?: A Little  How much help from another person for climbing 3-5 steps with a railing?: Total  AM-PAC Inpatient Mobility Raw Score : 16  AM-PAC Inpatient T-Scale Score : 40.78  Mobility Inpatient CMS 0-100% Score: 54.16  Mobility Inpatient CMS G-Code Modifier : CK    SUBJECTIVE:   Mr. 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    OBJECTIVE:     PAIN: Ana Love / O2: Yanet Lee / Meredith Horn / [place]:   Pre Treatment: Pain Assessment: 0-10  Pain Level: 3  Pain Location: Hip  Pain Orientation: Right  Functional Pain Assessment: Activities are not prevented  Non-Pharmaceutical Pain Intervention(s): Ambulation/Increased Activity; Elevation; Emotional support; Exercise;Repositioned      Post Treatment: 1/10, in recliner with Les elevated Vitals    monitored    Oxygen monitored RA      Aguilar Catheter and Telemetry     RESTRICTIONS/PRECAUTIONS:  Restrictions/Precautions: Fall Risk, Weight Bearing  Right Lower Extremity Weight Bearing: Weight Bearing As Tolerated  Left Lower Extremity Weight Bearing: Weight Bearing As Tolerated              GROSS EVALUATION: Intact Impaired (Comments):   AROM []  Decreased but functional   PROM []    Strength []  Decreased but functional   Balance []  Decreased but functional   Posture [] N/A   Sensation [x]  grossly   Coordination []   Decreased but functional   Tone []  Decreased but functional   Edema []    Activity Tolerance []  Fair-    []      COGNITION/  PERCEPTION: Intact Impaired (Comments):   Orientation [x]     Vision [x]     Hearing [x]     Cognition  [x]       MOBILITY: I Mod I S SBA CGA Min Mod Max Total  NT x2 Comments:   Bed Mobility    Rolling [] [] [] [] [] [] [] [] [] [] []    Supine to Sit [] [] [] [] [] [x] [] [] [] [] []    Scooting [] [] [] [] [x] [] [] [] [] [] []    Sit to Supine [] [] [] [] [] [] [] [] [] [] []    Transfers    Sit to Stand [] [] [] [] [x] [] [] [] [] [] []    Bed to Chair [] [] [] [] [x] [] [] [] [] [] []    Stand to Sit [] [] [] [] [x] [] [] [] [] [] []     [] [] [] [] [] [] [] [] [] [] []    I=Independent, Mod I=Modified Independent, S=Supervision, SBA=Standby Assistance, CGA=Contact Guard Assistance,   Min=Minimal Assistance, Mod=Moderate Assistance, Max=Maximal Assistance, Total=Total Assistance, NT=Not Tested    GAIT: I Mod I S SBA CGA Min Mod Max Total  NT x2 Comments:   Level of Assistance [] [] [] [] [x] [] [] [] [] [] []    Distance 100     DME Rolling Walker    Gait Quality Antalgic, Decreased rosana , Decreased step clearance, Decreased step length, and Decreased stance    Weightbearing Status Lower Extremity Weight Bearing Restrictions  Right Lower Extremity Weight Bearing: Weight Bearing As Tolerated  Left Lower Extremity Weight Bearing: Weight Bearing As Tolerated    Stairs NT     I=Independent, Mod I=Modified Independent, S=Supervision, SBA=Standby Assistance, CGA=Contact Guard Assistance,   Min=Minimal Assistance, Mod=Moderate Assistance, Max=Maximal Assistance, Total=Total Assistance, NT=Not Tested    PLAN:   FREQUENCY AND DURATION: Daily for duration of hospital stay or until stated goals are met, whichever comes first.    THERAPY PROGNOSIS: Good    PROBLEM LIST:   (Skilled intervention is medically necessary to address:)  Decreased ADL/Functional Activities  Decreased Activity Tolerance  Decreased AROM/PROM  Decreased Balance  Decreased Coordination  Decreased Gait Ability  Decreased Safety Awareness  Decreased Strength  Decreased Transfer Abilities  Increased Pain INTERVENTIONS PLANNED:   (Benefits and precautions of physical therapy have been discussed with the patient.)  Self Care Training  Therapeutic Activity  Therapeutic Exercise/HEP  Gait Training  Education       TREATMENT:   EVALUATION:     TREATMENT:   Therapeutic Activity (25 Minutes): Therapeutic activity included Supine to Sit, Scooting, Transfer Training, Ambulation on level ground, Standing balance, and LE exs, positioning to improve functional Activity tolerance, Balance, Coordination, Mobility, Strength, and ROM.     TREATMENT GRID:   Date:  10/20/22 Date:  10/21/22 Date:     Activity/Exercise Parameters Parameters Parameters   RECLINED: ankle DF/PF 10 15         Hip AB/AD 10 15         Heels Slides 10AA 15         SLR 10 15    SITTING: LAQ 10 15                    AFTER TREATMENT PRECAUTIONS: Bed/Chair Locked, Call light within reach, Chair, Needs within reach, and RN notified    INTERDISCIPLINARY COLLABORATION:  RN/ PCT and PT/ PTA    EDUCATION: Education Given To: Patient  Education Provided: Role of Therapy;Home Exercise Program;Plan of Care;Precautions;Transfer Training; Fall Prevention Strategies  Education Method: Demonstration;Verbal  Education Outcome: Verbalized understanding;Demonstrated understanding    TIME IN/OUT:  Time In: 0805  Time Out: 0830  Minutes: Alena Stanley, PT

## 2022-10-21 NOTE — PROGRESS NOTES
Hospitalist Progress Note   Admit Date:  10/18/2022 12:14 PM   Name:  Nilson Munoz   Age:  81 y.o. Sex:  male  :  1935   MRN:  [id number]   Room:  [facility]/    Presenting Complaint: Fatigue     Reason(s) for Admission: Generalized weakness [R53.1]  Closed nondisplaced fracture of anterior wall of right acetabulum, initial encounter ([facility] [address] [S32.414A]  Abdominal trauma, initial encounter [S39.91XA]  Inferior pubic ramus fracture, right, closed, initial encounter ([facility] [address] [S32.591A]  Acute renal failure, unspecified acute renal failure type [facility]) [N17.9]     Hospital Course:     Copied from prior provider HPI/summary of events:  81 y.o. male with medical history of  DM II, HTN, hyperkalemia secondary to bactrim use who presented with c/o generalized fatigue and increased urinary frequency. Pt reports a fall backwards down his brick stairs about 2 weeks ago and c/o abd pain since. Started having urinary incontinence last night. CT head neg for acute findings. CT c-spine neg for acute findings. CT chest/abd/pelvis with extensive findings of abdominal trauma (see full report below). Also found to have an right inferior pubic ramus fx and nondisplaced fx of the anterior wall of the right acetabulum. Ortho surgery (Dr. Luc Neff) consulted per ER. General Surgery (Dr. Flores Phillips) contacted per ER. BC sent. Also with TIGIST with creatinine at 3.28 baseline around 1.3. Pt hemodynamically stable. Denies CP, SOB, n/v/d. Subjective & 24hr Events (10/21/22): Renal function continues to improve with BUN 30 creatinine 1.0. TIGIST resolved. Hemoglobin stable at 8.9. Has Aguilar catheter likely new. May need removal with evaluation for urine retention. Awaiting insurance approval for [facility]. Assessment & Plan:   * Sepsis with acute renal failure and tubular necrosis (HCC)  Assessment & Plan  Admission criteria met with HR 96, RR 41, ruq CT findings.   -empiric antibiotics (pip-tazo)  -follow cultures  10/21/2022: cultures NGTD-TIGIST resolved. Gout  Assessment & Plan  -hold allopurinol  10/21--resume     Acute renal failure with acute tubular necrosis superimposed on stage 3a chronic kidney disease (HCC)  Assessment & Plan  10/21--resolved-follow     Abnormal CT abd with RUQ inflammation  Assessment & Plan  Admission CT with \"stranding densities in the right upper quadrant adjacent to the gallbladder and first and second portion of the duodenum and in Morison's pouch. This tracks inferiorly. \"  -consult general surgery: concern for duodenitis  10/21/2022: continue conservative treatment          Diabetes mellitus type 2, controlled ([facility] [address]  Assessment & Plan  -hold metformin, sitagliptin  -sliding scale insulin     3.4cm left adrenal mass noted incidentally on CT 10/18/22  Assessment & Plan  -outpatient follow up with urology     Hyperlipidemia  Assessment & Plan  -hold atorvastatin     Urinary retention  Assessment & Plan  -haines  10/20/2022: voiding trial 10/22 or 10/23, OP FU with urology  10/21--will need outpatient urology follow-up. Generalized weakness  Assessment & Plan  -PT, OT, PPD  10/21--for inpatient rehab. Fall  Assessment & Plan  -fall precuations     Closed nondisplaced fracture of anterior wall of right acetabulum [facility])  Assessment & Plan  -consult orthopedic surgery: conservative management  10/21--continue same-physical therapy. Primary hypertension  Assessment & Plan  -amlodipine, [id number]--controlled-continue monitor                   Anticipated discharge needs:      Short term rehab     Diet:  ADULT DIET;  Regular; 4 carb choices (60 gm/meal)  ADULT ORAL NUTRITION SUPPLEMENT; Breakfast, Lunch, Dinner; Diabetic Oral Supplement  DVT PPx: SCDs  Code status: Full Code            Hospital Problems:  Principal Problem:    Sepsis with acute renal failure and tubular necrosis (HCC)  Active Problems:    Skin tear of elbow without complication    Abnormal CT abd with RUQ inflammation    Closed nondisplaced fracture of anterior wall of right acetabulum (HCC)    Inferior pubic ramus fracture, right, closed, initial encounter ([facility] [address]    Acute renal failure with acute tubular necrosis superimposed on stage 3a chronic kidney disease (HCC)    Fall    Contusion of abdominal wall    Generalized weakness    Closed nondisplaced fracture of anterior wall of right acetabulum with delayed healing    Urinary retention    Hyperlipidemia    Gout    Chronic kidney disease, stage 3a (HCC)    3.4cm left adrenal mass noted incidentally on CT 10/18/22    Normocytic anemia    Primary hypertension    Diabetes mellitus type 2, controlled ([facility] [address]  Resolved Problems:    * No resolved hospital problems. *      Objective:   Patient Vitals for the past 24 hrs:   Temp Pulse Resp BP SpO2   10/21/22 0734 98.4 °F (36.9 °C) 82 18 (!) 137/57 96 %   10/21/22 0332 98.1 °F (36.7 °C) 83 18 132/67 95 %   10/21/22 0018 97.3 °F (36.3 °C) 84 20 137/60 95 %   10/20/22 1915 97.5 °F (36.4 °C) 76 -- 121/69 97 %   10/20/22 1839 97.5 °F (36.4 °C) 81 16 (!) 142/68 96 %   10/20/22 1600 -- 90 14 (!) 147/59 95 %   10/20/22 1500 -- 81 21 (!) 111/54 97 %   10/20/22 1454 97.8 °F (36.6 °C) -- -- -- --   10/20/22 1420 -- 74 19 111/60 97 %       Oxygen Therapy  SpO2: 96 %  Pulse Oximetry Type: Continuous  Pulse via Oximetry: 88 beats per minute  SPO2 High Alarm Limit: 100  SPO2 Low Alarm Limit POX: 90  Pulse Oximeter Device Mode: Continuous  Pulse Oximeter Device Location: Right, Hand, Finger  O2 Device: None (Room air)  Oximetry Probe Site Changed: No  Skin Assessment: Clean, dry, & intact  Skin Protection for O2 Device: N/A  O2 Flow Rate (L/min): 0 L/min  Oxygen Therapy: Supplemental oxygen  O2 Delivery Method: Nasal cannula    Estimated body mass index is 18.73 kg/m² as calculated from the following:    Height as of this encounter: 6' 1\" (1.854 m). Weight as of this encounter: 142 lb (64.4 kg).     Intake/Output Summary (Last 24 hours) at 10/21/2022 1308  Last data filed at 10/21/2022 0646  Gross per 24 hour   Intake 1110 ml   Output 1300 ml   Net -190 ml         Physical Exam:     Blood pressure (!) 137/57, pulse 82, temperature 98.4 °F (36.9 °C), temperature source Oral, resp. rate 18, height 6' 1\" (1.854 m), weight 142 lb (64.4 kg), SpO2 96 %. General:    Alert to person only. Short-term recall essentially absent. Head:  Normocephalic, atraumatic  Eyes:  Sclerae appear normal.  Pupils equally round. ENT:  Nares appear normal, no drainage. Moist oral mucosa  Neck:  No restricted ROM. Trachea midline   CV:   RRR. No m/r/g. No jugular venous distension. Lungs:   CTAB. No wheezing, rhonchi, or rales. Symmetric expansion. Abdomen: Bowel sounds present. Soft, nontender, nondistended. Extremities: No cyanosis or clubbing. No increased distal extremity edema  Neuro:  CN II-XII grossly intact. Sensation intact.       I have personally reviewed labs and tests showing:  Recent Labs:  Recent Results (from the past 48 hour(s))   Hemoglobin and Hematocrit    Collection Time: 10/19/22  1:55 PM   Result Value Ref Range    Hemoglobin 10.0 (L) 13.6 - 17.2 g/dL    Hematocrit 31.9 (L) 41.1 - 50.3 %   Hemoglobin and Hematocrit    Collection Time: 10/19/22 10:01 PM   Result Value Ref Range    Hemoglobin 8.9 (L) 13.6 - 17.2 g/dL    Hematocrit 28.0 (L) 41.1 - 50.3 %   PLEASE READ & DOCUMENT PPD TEST IN 24 HRS    Collection Time: 10/19/22 10:26 PM   Result Value Ref Range    PPD, (POC) Negative Negative    mm Induration 0 0 - 5 mm   CBC with Auto Differential    Collection Time: 10/20/22  3:17 AM   Result Value Ref Range    WBC 7.6 4.3 - 11.1 K/uL    RBC 2.87 (L) 4.23 - 5.6 M/uL    Hemoglobin 8.7 (L) 13.6 - 17.2 g/dL    Hematocrit 27.8 (L) 41.1 - 50.3 %    MCV 96.9 82.0 - 102.0 FL    MCH 30.3 26.1 - 32.9 PG    MCHC 31.3 (L) 31.4 - 35.0 g/dL    RDW 15.7 (H) 11.9 - 14.6 %    Platelets 341 874 - 190 K/uL    MPV 10.7 9.4 - 12.3 FL    nRBC 0.00 0.0 - 0.2 K/uL Differential Type AUTOMATED      Seg Neutrophils 73 43 - 78 %    Lymphocytes 10 (L) 13 - 44 %    Monocytes 9 4.0 - 12.0 %    Eosinophils % 4 0.5 - 7.8 %    Basophils 1 0.0 - 2.0 %    Immature Granulocytes 3 0.0 - 5.0 %    Segs Absolute 5.6 1.7 - 8.2 K/UL    Absolute Lymph # 0.8 0.5 - 4.6 K/UL    Absolute Mono # 0.7 0.1 - 1.3 K/UL    Absolute Eos # 0.3 0.0 - 0.8 K/UL    Basophils Absolute 0.1 0.0 - 0.2 K/UL    Absolute Immature Granulocyte 0.2 0.0 - 0.5 K/UL   Phosphorus    Collection Time: 10/20/22  3:17 AM   Result Value Ref Range    Phosphorus 2.9 2.3 - 3.7 MG/DL   TSH with Reflex    Collection Time: 10/20/22  3:17 AM   Result Value Ref Range    TSH w Free Thyroid if Abnormal 2.25 0.358 - 3.740 UIU/ML   Hemoglobin A1C    Collection Time: 10/20/22  3:17 AM   Result Value Ref Range    Hemoglobin A1C 7.5 (H) 4.8 - 5.6 %    eAG 169 mg/dL   Comprehensive Metabolic Panel w/ Reflex to MG    Collection Time: 10/20/22  3:17 AM   Result Value Ref Range    Sodium 142 133 - 143 mmol/L    Potassium 5.3 (H) 3.5 - 5.1 mmol/L    Chloride 113 (H) 101 - 110 mmol/L    CO2 24 21 - 32 mmol/L    Anion Gap 5 2 - 11 mmol/L    Glucose 198 (H) 65 - 100 mg/dL    BUN 40 (H) 8 - 23 MG/DL    Creatinine 1.18 0.8 - 1.5 MG/DL    Est, Glom Filt Rate >60 >60 ml/min/1.73m2    Calcium 8.5 8.3 - 10.4 MG/DL    Total Bilirubin 0.3 0.2 - 1.1 MG/DL    ALT 14 12 - 65 U/L    AST 14 (L) 15 - 37 U/L    Alk Phosphatase 74 50 - 136 U/L    Total Protein 5.3 (L) 6.3 - 8.2 g/dL    Albumin 2.5 (L) 3.2 - 4.6 g/dL    Globulin 2.8 2.8 - 4.5 g/dL    Albumin/Globulin Ratio 0.9 0.4 - 1.6     CBC with Auto Differential    Collection Time: 10/21/22  4:15 AM   Result Value Ref Range    WBC 6.7 4.3 - 11.1 K/uL    RBC 2.99 (L) 4.23 - 5.6 M/uL    Hemoglobin 8.9 (L) 13.6 - 17.2 g/dL    Hematocrit 28.5 (L) 41.1 - 50.3 %    MCV 95.3 82.0 - 102.0 FL    MCH 29.8 26.1 - 32.9 PG    MCHC 31.2 (L) 31.4 - 35.0 g/dL    RDW 15.7 (H) 11.9 - 14.6 %    Platelets 879 917 - 417 K/uL    MPV 10.2 9.4 - 12.3 FL    nRBC 0.00 0.0 - 0.2 K/uL    Differential Type AUTOMATED      Seg Neutrophils 65 43 - 78 %    Lymphocytes 15 13 - 44 %    Monocytes 9 4.0 - 12.0 %    Eosinophils % 6 0.5 - 7.8 %    Basophils 1 0.0 - 2.0 %    Immature Granulocytes 4 0.0 - 5.0 %    Segs Absolute 4.3 1.7 - 8.2 K/UL    Absolute Lymph # 1.0 0.5 - 4.6 K/UL    Absolute Mono # 0.6 0.1 - 1.3 K/UL    Absolute Eos # 0.4 0.0 - 0.8 K/UL    Basophils Absolute 0.1 0.0 - 0.2 K/UL    Absolute Immature Granulocyte 0.3 0.0 - 0.5 K/UL   Phosphorus    Collection Time: 10/21/22  4:15 AM   Result Value Ref Range    Phosphorus 3.1 2.3 - 3.7 MG/DL   Comprehensive Metabolic Panel w/ Reflex to MG    Collection Time: 10/21/22  4:15 AM   Result Value Ref Range    Sodium 139 133 - 143 mmol/L    Potassium 4.9 3.5 - 5.1 mmol/L    Chloride 108 101 - 110 mmol/L    CO2 27 21 - 32 mmol/L    Anion Gap 4 2 - 11 mmol/L    Glucose 192 (H) 65 - 100 mg/dL    BUN 30 (H) 8 - 23 MG/DL    Creatinine 1.00 0.8 - 1.5 MG/DL    Est, Glom Filt Rate >60 >60 ml/min/1.73m2    Calcium 8.7 8.3 - 10.4 MG/DL    Total Bilirubin 0.4 0.2 - 1.1 MG/DL    ALT 15 12 - 65 U/L    AST 12 (L) 15 - 37 U/L    Alk Phosphatase 75 50 - 136 U/L    Total Protein 6.0 (L) 6.3 - 8.2 g/dL    Albumin 2.5 (L) 3.2 - 4.6 g/dL    Globulin 3.5 2.8 - 4.5 g/dL    Albumin/Globulin Ratio 0.7 0.4 - 1.6         I have personally reviewed imaging studies showing: Other Studies:  CT Head W/O Contrast   Final Result   Negative for acute intracranial abnormality. Chronic changes. CT CSpine W/O Contrast   Final Result   1) Negative for acute cervical spine fracture. 2) Facet arthropathy and spondylosis and carotid atherosclerosis. 3) Chronic bronchiectasis and peribronchial thickening and chronic inflammatory   change right lung apex.             CT CHEST ABDOMEN PELVIS WO CONTRAST Additional Contrast? None   Final Result   Exam limited due to lack of intravenous contrast.    1) Nondisplaced fracture anterior lip right acetabulum and fracture inferior   pubic ramus on the right with small amount of callus formation. 2) Stranding densities in the right upper quadrant, near the neck the   gallbladder, first and second portion of the duodenum and in Morison's pouch. This could be acute inflammation or posttraumatic hemorrhage. 3) Stranding densities tract along the right abdomen down into the spermatic   cord and upper scrotum on the right, inflammatory versus posttraumatic. 4) A 3.4 cm left adrenal mass, neoplasia versus adrenal hemorrhage. 5) Urinary bladder is quite distended. XR ELBOW RIGHT (MIN 3 VIEWS)   Final Result   Negative for acute fracture. Osteoarthritis.              Current Meds:  Current Facility-Administered Medications   Medication Dose Route Frequency    sodium zirconium cyclosilicate (LOKELMA) oral suspension 5 g  5 g Oral Every Other Day    potassium chloride (KLOR-CON M) extended release tablet 40 mEq  40 mEq Oral PRN    Or    potassium bicarb-citric acid (EFFER-K) effervescent tablet 40 mEq  40 mEq Oral PRN    Or    potassium chloride 10 mEq/100 mL IVPB (Peripheral Line)  10 mEq IntraVENous PRN    magnesium sulfate 2000 mg in 50 mL IVPB premix  2,000 mg IntraVENous PRN    sodium phosphate 10 mmol in sodium chloride 0.9 % 250 mL IVPB  10 mmol IntraVENous PRN    Or    sodium phosphate 15 mmol in sodium chloride 0.9 % 250 mL IVPB  15 mmol IntraVENous PRN    Or    sodium phosphate 20 mmol in sodium chloride 0.9 % 500 mL IVPB  20 mmol IntraVENous PRN    pantoprazole (PROTONIX) 40 mg in sodium chloride (PF) 10 mL injection  40 mg IntraVENous Daily    sodium chloride flush 0.9 % injection 5-40 mL  5-40 mL IntraVENous 2 times per day    sodium chloride flush 0.9 % injection 5-40 mL  5-40 mL IntraVENous PRN    0.9 % sodium chloride infusion   IntraVENous PRN    ondansetron (ZOFRAN-ODT) disintegrating tablet 4 mg  4 mg Oral Q8H PRN    Or    ondansetron (ZOFRAN) injection 4 mg  4 mg IntraVENous Q6H PRN    acetaminophen (TYLENOL) tablet 650 mg  650 mg Oral Q6H PRN    Or    acetaminophen (TYLENOL) suppository 650 mg  650 mg Rectal Q6H PRN       Signed:  Zofia Hoffman DO    Part of this note may have been written by using a voice dictation software. The note has been proof read but may still contain some grammatical/other typographical errors.

## 2022-10-21 NOTE — CONSULTS
Comprehensive Nutrition Assessment    Type and Reason for Visit: Initial, Consult  Oral Nutrition Supplements (Hospitalists)    Nutrition Recommendations/Plan:   Food and/or Nutrient Delivery: Modify Current Diet, Modify Oral Nutrition Supplement  Add low K feature to current [facility] diet. Medical food supplement therapy:  Initiate Nepro once per day (this provides 420 kcal and 19 grams protein per bottle) and Glucerna Shake once per day (this provides 220 kcal and 10 grams protein per bottle)   Nutrition Education/Counseling: Education completed. Discussed tips for increasing kcal intake while maintaining lower K intake. Discussed ONS use. Coordination of Nutrition Care: Continue to monitor while inpatient, Interdisciplinary Rounds          Malnutrition Assessment:  Malnutrition Status: Moderate malnutrition (+ weight loss)  Context: Chronic Illness  Findings of clinical characteristics of malnutrition:   Energy Intake:   (Estimated baseline intake at 70-75% of estimated needs)  Weight Loss:   (~6.5-7% in past ~ 3 months)     Body Fat Loss:  Mild body fat loss Buccal region   Muscle Mass Loss:  Mild muscle mass loss Clavicles (pectoralis & deltoids), Temples (temporalis)      Nutrition Assessment:  Nutrition History: 10/21; Pt started following a low K diet about 6 months ago per recommendation from Nephrologist. He also was prescribed Lokelma. He denies any change in appetite or intake. Baseline intake is 3 meals and 2 snacks per day. B-honey nut cheerios and 2% milk, L-whole ham sandwich, D-vegetable plate or meat and 2 sides from \"cafeteria\" snacks-apple with small amount of peanut butter, fig burt or cream cookie. He went to the wound clinic after his fall and the doctor recommended 2 bottles per day of Ensure max and Bhaskar. He has been drinking the Ensure Max bid but did not drink the Bhaskar. His sister reports his wounds are almost healed now.  Sister reports pt dropped a lot of weight several years ago when his DM medications were changed. Pt reports he leveled out at ~165# for a few years but then he has had additional gradual weight loss over time. Pt endorses recent weight loss and says says he is skinny. Remote UBW was close to 200# when he was in the [employer]. Nutrition Background:   H/O DM, HTN, HLD, hyperkalemia from combination of Bactrim and Lisinopril use  P/W generalized fatigue, urinary frequency and dysuria, recent fall with skins tears, back, abdominal and hip pain. Findings of acetabular, pubic fracture, TIGIST, Abnormal CT abd with RUQ inflammation (possible duodenitis), adrenal mass  Nutrition Interval:  Pt seen sitting in chair, sister Emily Bench) in room. Pt reports current appetite is good and that he eats close to 100% of all meals. Sister confirms. He has not yet tried the Glucerna shake. He is receptive to trying Glucerna as well as Nepro. Pertinent meds: Lokelma every other day      Current Nutrition Therapies:  ADULT DIET;  Regular; 4 carb choices (60 gm/meal)  ADULT ORAL NUTRITION SUPPLEMENT; Breakfast, Lunch, Dinner; Diabetic Oral Supplement    Current Intake:   Average Meal Intake: % (For 3 recorded meal and per pt and siter report)        Anthropometric Measures:  Height: 6' 1\" (185.4 cm)  Current Body Wt: 141 lb 15.6 oz (64.4 kg) (10/18), Weight source: Bed Scale  Weight History Weight Weight Weight Method   10/18/2022 142 lbs 64.4 kg Bed scale   10/18/2022 144 lbs 65.3 kg -   10/14/2022 144 lbs 65.3 kg -   10/12/2022 144 lbs 65.3 kg -   10/11/2022 144 lbs 65.3 kg Standing scale   BMI: 18.7  Admission Body Weight: 143 lb 15.4 oz (65.3 kg) (standing scale)  Ideal Body Weight (Kg) (Calculated): 84 kg (184 lbs),    Usual Body Wt:  (Per EMR PCP and nephrology OV: 152-155# (Jan-April 2022), 147-149# (May 2022), 142-144# (Sept 2022), 144# (10/7/2022)-PCP OV),   1/28/2022 150 lbs 68 kg -   1/25/2022 180 lbs 81.6 kg -   Percent weight change:         Edema: No data recorded  BMI Category Underweight (BMI less than 22) age over 72  Estimated Daily Nutrient Needs:  Energy (kcal/day): 4126-5205 (30-35 kcal/kg) (Kcal/kg Weight used: 64.4 kg Current  Protein (g/day): 64-84 (1-1.3 g/kg) Weight Used: ( ) 64.4 kg  Fluid (ml/day):   (1 ml/kcal)    Nutrition Diagnosis:   Unintended weight loss related to inadequate protein-energy intake as evidenced by  (diet recall as above)  Moderate malnutrition, In context of chronic illness related to inadequate protein-energy intake as evidenced by Criteria as identified in malnutrition assessment    Goals:       Active Goal:  (Weight gain of 1-2# per month)       Nutrition Monitoring and Evaluation:      Food/Nutrient Intake Outcomes: Food and Nutrient Intake, Supplement Intake  Physical Signs/Symptoms Outcomes: Weight    Discharge Planning:    Continue Oral Nutrition Supplement    Janice Moseley, RD,LD, [id number] [facility]

## 2022-10-21 NOTE — PLAN OF CARE
Shift assessment complete. Patient is alert and oriented, in bed. Respirations are even and unlabored. Bowel sounds are present. Last BM was yesterday per patient. Aguilar in place and draining appropriately. Patient has skin tears on right elbow from fall. Denies pain. No needs expressed at this time. Bed low and locked, call light within reach.   _____________________________________    Problem: Pain  Goal: Verbalizes/displays adequate comfort level or baseline comfort level  Outcome: Progressing     Problem: Safety - Adult  Goal: Free from fall injury  Outcome: Progressing     Problem: Skin/Tissue Integrity  Goal: Absence of new skin breakdown  Description: 1. Monitor for areas of redness and/or skin breakdown  2. Assess vascular access sites hourly  3. Every 4-6 hours minimum:  Change oxygen saturation probe site  4. Every 4-6 hours:  If on nasal continuous positive airway pressure, respiratory therapy assess nares and determine need for appliance change or resting period.   Outcome: Progressing     Problem: Respiratory - Adult  Goal: Achieves optimal ventilation and oxygenation  Outcome: Progressing

## 2022-10-22 LAB
ALBUMIN SERPL-MCNC: 2.7 G/DL (ref 3.2–4.6)
ALBUMIN/GLOB SERPL: 0.8 {RATIO} (ref 0.4–1.6)
ALP SERPL-CCNC: 88 U/L (ref 50–136)
ALT SERPL-CCNC: 20 U/L (ref 12–65)
ANION GAP SERPL CALC-SCNC: 4 MMOL/L (ref 2–11)
AST SERPL-CCNC: 17 U/L (ref 15–37)
BASOPHILS # BLD: 0.1 K/UL (ref 0–0.2)
BASOPHILS NFR BLD: 1 % (ref 0–2)
BILIRUB SERPL-MCNC: 0.3 MG/DL (ref 0.2–1.1)
BUN SERPL-MCNC: 27 MG/DL (ref 8–23)
CALCIUM SERPL-MCNC: 8.9 MG/DL (ref 8.3–10.4)
CHLORIDE SERPL-SCNC: 106 MMOL/L (ref 101–110)
CO2 SERPL-SCNC: 28 MMOL/L (ref 21–32)
CREAT SERPL-MCNC: 1.07 MG/DL (ref 0.8–1.5)
DIFFERENTIAL METHOD BLD: ABNORMAL
EOSINOPHIL # BLD: 0.3 K/UL (ref 0–0.8)
EOSINOPHIL NFR BLD: 4 % (ref 0.5–7.8)
ERYTHROCYTE [DISTWIDTH] IN BLOOD BY AUTOMATED COUNT: 15.2 % (ref 11.9–14.6)
GLOBULIN SER CALC-MCNC: 3.5 G/DL (ref 2.8–4.5)
GLUCOSE BLD STRIP.AUTO-MCNC: 190 MG/DL (ref 65–100)
GLUCOSE BLD STRIP.AUTO-MCNC: 326 MG/DL (ref 65–100)
GLUCOSE SERPL-MCNC: 259 MG/DL (ref 65–100)
HCT VFR BLD AUTO: 29.2 % (ref 41.1–50.3)
HGB BLD-MCNC: 9.2 G/DL (ref 13.6–17.2)
IMM GRANULOCYTES # BLD AUTO: 0.3 K/UL (ref 0–0.5)
IMM GRANULOCYTES NFR BLD AUTO: 5 % (ref 0–5)
LYMPHOCYTES # BLD: 0.7 K/UL (ref 0.5–4.6)
LYMPHOCYTES NFR BLD: 10 % (ref 13–44)
MCH RBC QN AUTO: 30 PG (ref 26.1–32.9)
MCHC RBC AUTO-ENTMCNC: 31.5 G/DL (ref 31.4–35)
MCV RBC AUTO: 95.1 FL (ref 82–102)
MONOCYTES # BLD: 0.6 K/UL (ref 0.1–1.3)
MONOCYTES NFR BLD: 8 % (ref 4–12)
NEUTS SEG # BLD: 5.2 K/UL (ref 1.7–8.2)
NEUTS SEG NFR BLD: 72 % (ref 43–78)
NRBC # BLD: 0 K/UL (ref 0–0.2)
PLATELET # BLD AUTO: 198 K/UL (ref 150–450)
PMV BLD AUTO: 10.6 FL (ref 9.4–12.3)
POTASSIUM SERPL-SCNC: 4.6 MMOL/L (ref 3.5–5.1)
PROT SERPL-MCNC: 6.2 G/DL (ref 6.3–8.2)
RBC # BLD AUTO: 3.07 M/UL (ref 4.23–5.6)
SERVICE CMNT-IMP: ABNORMAL
SERVICE CMNT-IMP: ABNORMAL
SODIUM SERPL-SCNC: 138 MMOL/L (ref 133–143)
WBC # BLD AUTO: 7.1 K/UL (ref 4.3–11.1)

## 2022-10-22 PROCEDURE — A4216 STERILE WATER/SALINE, 10 ML: HCPCS | Performed by: SURGERY

## 2022-10-22 PROCEDURE — 80053 COMPREHEN METABOLIC PANEL: CPT

## 2022-10-22 PROCEDURE — 82962 GLUCOSE BLOOD TEST: CPT

## 2022-10-22 PROCEDURE — 85025 COMPLETE CBC W/AUTO DIFF WBC: CPT

## 2022-10-22 PROCEDURE — C9113 INJ PANTOPRAZOLE SODIUM, VIA: HCPCS | Performed by: SURGERY

## 2022-10-22 PROCEDURE — 6370000000 HC RX 637 (ALT 250 FOR IP): Performed by: FAMILY MEDICINE

## 2022-10-22 PROCEDURE — 1100000000 HC RM PRIVATE

## 2022-10-22 PROCEDURE — 6370000000 HC RX 637 (ALT 250 FOR IP): Performed by: INTERNAL MEDICINE

## 2022-10-22 PROCEDURE — 2580000003 HC RX 258: Performed by: NURSE PRACTITIONER

## 2022-10-22 PROCEDURE — 36415 COLL VENOUS BLD VENIPUNCTURE: CPT

## 2022-10-22 PROCEDURE — 97110 THERAPEUTIC EXERCISES: CPT

## 2022-10-22 PROCEDURE — 2580000003 HC RX 258: Performed by: SURGERY

## 2022-10-22 PROCEDURE — 6360000002 HC RX W HCPCS: Performed by: SURGERY

## 2022-10-22 PROCEDURE — 97530 THERAPEUTIC ACTIVITIES: CPT

## 2022-10-22 RX ORDER — LANOLIN ALCOHOL/MO/W.PET/CERES
500 CREAM (GRAM) TOPICAL DAILY
Status: DISCONTINUED | OUTPATIENT
Start: 2022-10-23 | End: 2022-10-25 | Stop reason: HOSPADM

## 2022-10-22 RX ORDER — ASPIRIN 81 MG/1
81 TABLET ORAL DAILY
Status: DISCONTINUED | OUTPATIENT
Start: 2022-10-23 | End: 2022-10-25 | Stop reason: HOSPADM

## 2022-10-22 RX ORDER — ATORVASTATIN CALCIUM 10 MG/1
20 TABLET, FILM COATED ORAL NIGHTLY
Status: DISCONTINUED | OUTPATIENT
Start: 2022-10-22 | End: 2022-10-25 | Stop reason: HOSPADM

## 2022-10-22 RX ORDER — SENNA AND DOCUSATE SODIUM 50; 8.6 MG/1; MG/1
2 TABLET, FILM COATED ORAL DAILY PRN
Status: DISCONTINUED | OUTPATIENT
Start: 2022-10-22 | End: 2022-10-25 | Stop reason: HOSPADM

## 2022-10-22 RX ORDER — INSULIN LISPRO 100 [IU]/ML
0-4 INJECTION, SOLUTION INTRAVENOUS; SUBCUTANEOUS NIGHTLY
Status: DISCONTINUED | OUTPATIENT
Start: 2022-10-22 | End: 2022-10-25 | Stop reason: HOSPADM

## 2022-10-22 RX ORDER — DEXTROSE MONOHYDRATE 100 MG/ML
INJECTION, SOLUTION INTRAVENOUS CONTINUOUS PRN
Status: DISCONTINUED | OUTPATIENT
Start: 2022-10-22 | End: 2022-10-25 | Stop reason: HOSPADM

## 2022-10-22 RX ORDER — PANTOPRAZOLE SODIUM 40 MG/1
40 TABLET, DELAYED RELEASE ORAL
Status: DISCONTINUED | OUTPATIENT
Start: 2022-10-23 | End: 2022-10-25 | Stop reason: HOSPADM

## 2022-10-22 RX ORDER — AMLODIPINE BESYLATE 5 MG/1
5 TABLET ORAL DAILY
Status: DISCONTINUED | OUTPATIENT
Start: 2022-10-23 | End: 2022-10-25 | Stop reason: HOSPADM

## 2022-10-22 RX ORDER — INSULIN LISPRO 100 [IU]/ML
0-8 INJECTION, SOLUTION INTRAVENOUS; SUBCUTANEOUS
Status: DISCONTINUED | OUTPATIENT
Start: 2022-10-22 | End: 2022-10-25 | Stop reason: HOSPADM

## 2022-10-22 RX ADMIN — SODIUM CHLORIDE, PRESERVATIVE FREE 10 ML: 5 INJECTION INTRAVENOUS at 10:09

## 2022-10-22 RX ADMIN — INSULIN LISPRO 8 UNITS: 100 INJECTION, SOLUTION INTRAVENOUS; SUBCUTANEOUS at 17:16

## 2022-10-22 RX ADMIN — METFORMIN HYDROCHLORIDE 500 MG: 500 TABLET ORAL at 17:16

## 2022-10-22 RX ADMIN — SODIUM CHLORIDE 40 MG: 9 INJECTION, SOLUTION INTRAMUSCULAR; INTRAVENOUS; SUBCUTANEOUS at 10:07

## 2022-10-22 RX ADMIN — ATORVASTATIN CALCIUM 20 MG: 10 TABLET, FILM COATED ORAL at 21:24

## 2022-10-22 RX ADMIN — TAMSULOSIN HYDROCHLORIDE 0.4 MG: 0.4 CAPSULE ORAL at 10:07

## 2022-10-22 RX ADMIN — SODIUM CHLORIDE, PRESERVATIVE FREE 10 ML: 5 INJECTION INTRAVENOUS at 21:25

## 2022-10-22 RX ADMIN — SODIUM ZIRCONIUM CYCLOSILICATE 5 G: 5 POWDER, FOR SUSPENSION ORAL at 10:08

## 2022-10-22 RX ADMIN — ALLOPURINOL 50 MG: 100 TABLET ORAL at 10:06

## 2022-10-22 NOTE — PROGRESS NOTES
Hospitalist Progress Note   Admit Date:  10/18/2022 12:14 PM   Name:  Crow Barrios   Age:  81 y.o. Sex:  male  :  1935   MRN:  [id number]   Room:  [facility]/    Presenting Complaint: Fatigue     Reason(s) for Admission: Generalized weakness [R53.1]  Closed nondisplaced fracture of anterior wall of right acetabulum, initial encounter ([facility] [address] [S32.414A]  Abdominal trauma, initial encounter [S39.91XA]  Inferior pubic ramus fracture, right, closed, initial encounter ([facility] [address] [S32.591A]  Acute renal failure, unspecified acute renal failure type [facility]) [N17.9]     Hospital Course:     Copied from prior provider HPI/summary of events:  81 y.o. male with medical history of  DM II, HTN, hyperkalemia secondary to bactrim use who presented with c/o generalized fatigue and increased urinary frequency. Pt reports a fall backwards down his brick stairs about 2 weeks ago and c/o abd pain since. Started having urinary incontinence last night. CT head neg for acute findings. CT c-spine neg for acute findings. CT chest/abd/pelvis with extensive findings of abdominal trauma (see full report below). Also found to have an right inferior pubic ramus fx and nondisplaced fx of the anterior wall of the right acetabulum. Ortho surgery (Dr. Jossie Cross) consulted per ER. General Surgery (Dr. Jani Heart) contacted per ER. BC sent. Also with TIGIST with creatinine at 3.28 baseline around 1.3. Pt hemodynamically stable. Denies CP, SOB, n/v/d. Subjective & 24hr Events (10/22/22): No new complaints. Awaiting rehab bed offer. Renal function creatinine stable 1.1. May resume home Lipitor and amlodipine. Needs sliding scale insulin and may resume metformin at 500 mg twice daily and continue to hold Januvia. Still has Aguilar catheter and will likely need discharged with Aguilar catheter as admitted  and Flomax started. Needs at least 1 week prior to voiding trials. Discussed with patient.   He has no new complaints Assessment & Plan:   * Sepsis with acute renal failure and tubular necrosis (HCC)  Assessment & Plan  Admission criteria met with HR 96, RR 41, ruq CT findings. -empiric antibiotics (pip-tazo)  -follow cultures  10/212--resolved-awaiting skilled facility placement. Cultures not helpful-consider convert to oral antibiotics next 24 hours if continues to remain stable. Gout  Assessment & Plan  -hold allopurinol  10/22--resumed low-dose allopurinol October 21. Acute renal failure with acute tubular necrosis superimposed on stage 3a chronic kidney disease ([facility] [address]  Assessment & Plan  10/21--resolved-follow     Abnormal CT abd with RUQ inflammation  Assessment & Plan  Admission CT with \"stranding densities in the right upper quadrant adjacent to the gallbladder and first and second portion of the duodenum and in Morison's pouch. This tracks inferiorly. \"  -consult general surgery: concern for duodenitis  10/22--- stable with conservative therapy-          Diabetes mellitus type 2, controlled ([facility] [address]  Assessment & Plan  -hold metformin, sitagliptin  -sliding scale insulin  10/22--restart metformin twice daily-sliding scale prandial insulin coverage and continue to hold Januvia for now     3.4cm left adrenal mass noted incidentally on CT 10/18/22  Assessment & Plan  -outpatient follow up with urology     Hyperlipidemia  Assessment & Plan  -10/22-resume atorvastatin-renal function improved. Urinary retention  Assessment & Plan  -haines  10/20/2022: voiding trial 10/22 or 10/23, OP FU with urology  10/21--will need outpatient urology follow-up. 10/22--unchanged     Generalized weakness  Assessment & Plan  -PT, OT, PPD  10/21--for inpatient rehab. Fall  Assessment & Plan  -fall precuations     Closed nondisplaced fracture of anterior wall of right acetabulum [facility])  Assessment & Plan  -consult orthopedic surgery: conservative management  10/21--continue same-physical therapy. 10/22-plan unchanged     Primary hypertension  Assessment & Plan  -amlodipine, [id number]--resume home amlodipine-restart furosemide tomorrow if remains clinically stable                   Anticipated discharge needs:      Short term rehab     Diet:  ADULT DIET; Regular; 4 carb choices (60 gm/meal)  ADULT ORAL NUTRITION SUPPLEMENT; Breakfast, Lunch, Dinner; Diabetic Oral Supplement  DVT PPx: SCDs  Code status: Full Code            Hospital Problems:  Principal Problem:    Sepsis with acute renal failure and tubular necrosis (HCC)  Active Problems:    Skin tear of elbow without complication    Abnormal CT abd with RUQ inflammation    Closed nondisplaced fracture of anterior wall of right acetabulum (HCC)    Inferior pubic ramus fracture, right, closed, initial encounter ([facility] [address]    Acute renal failure with acute tubular necrosis superimposed on stage 3a chronic kidney disease (HCC)    Fall    Contusion of abdominal wall    Generalized weakness    Closed nondisplaced fracture of anterior wall of right acetabulum with delayed healing    Urinary retention    Hyperlipidemia    Gout    Chronic kidney disease, stage 3a (HCC)    3.4cm left adrenal mass noted incidentally on CT 10/18/22    Normocytic anemia    Primary hypertension    Diabetes mellitus type 2, controlled ([facility] [address]  Resolved Problems:    * No resolved hospital problems.  *      Objective:   Patient Vitals for the past 24 hrs:   Temp Pulse Resp BP SpO2   10/22/22 1221 98.1 °F (36.7 °C) (!) 101 20 124/71 96 %   10/22/22 0822 97.9 °F (36.6 °C) 81 18 (!) 155/68 98 %   10/22/22 0313 98.1 °F (36.7 °C) 90 16 133/73 96 %   10/21/22 2304 98 °F (36.7 °C) 99 16 137/73 97 %   10/21/22 1855 97.7 °F (36.5 °C) (!) 101 16 132/61 95 %   10/21/22 1515 98.2 °F (36.8 °C) 88 16 132/61 96 %         Oxygen Therapy  SpO2: 96 %  Pulse Oximetry Type: Continuous  Pulse via Oximetry: 88 beats per minute  SPO2 High Alarm Limit: 100  SPO2 Low Alarm Limit POX: 90  Pulse Oximeter Device Mode: Continuous  Pulse Oximeter Device Location: Right, Hand, Finger  O2 Device: None (Room air)  Oximetry Probe Site Changed: No  Skin Assessment: Clean, dry, & intact  Skin Protection for O2 Device: N/A  O2 Flow Rate (L/min): 0 L/min  Oxygen Therapy: Supplemental oxygen  O2 Delivery Method: Nasal cannula    Estimated body mass index is 18.73 kg/m² as calculated from the following:    Height as of this encounter: 6' 1\" (1.854 m). Weight as of this encounter: 142 lb (64.4 kg). Intake/Output Summary (Last 24 hours) at 10/22/2022 1323  Last data filed at 10/22/2022 1106  Gross per 24 hour   Intake --   Output 2150 ml   Net -2150 ml           Physical Exam:     Blood pressure 124/71, pulse (!) 101, temperature 98.1 °F (36.7 °C), temperature source Oral, resp. rate 20, height 6' 1\" (1.854 m), weight 142 lb (64.4 kg), SpO2 96 %. General-alert and oriented x3, cooperative and calm  Eyes-conjunctive are clear pupils equal round react to light extraocular muscles intact    Ears-no deformity-no drainage  Nares-no nasal deformity-no discharge-turbinates not significantly enlarged  Throat-oral mucosa moist, no erythema or exudate  Heart-regular rate and rhythm no rubs gallops clicks or murmurs. No JVD grossly  Lungs-clear auscultation palpation and percussion, symmetric excursion of the chest wall. No wheezing    Abdomen-soft, nontender, no gross percussible organomegaly. No gross distention. Bowel sounds present all 4 quadrants    Extremities-no significant edema, moves all extremities symmetrically. Skin-no obvious breakdown or significant rash  Neurologic-cranial nerves II through XII grossly intact, no focal motor deficits grossly. No tremor    Psychiatric-no suicidal ideations or homicidal ideations. Denies depressed thoughts.     I have personally reviewed labs and tests showing:  Recent Labs:  Recent Results (from the past 48 hour(s))   CBC with Auto Differential    Collection Time: 10/21/22  4:15 AM   Result Value Ref Range    WBC 6.7 4.3 - 11.1 K/uL    RBC 2.99 (L) 4.23 - 5.6 M/uL    Hemoglobin 8.9 (L) 13.6 - 17.2 g/dL    Hematocrit 28.5 (L) 41.1 - 50.3 %    MCV 95.3 82.0 - 102.0 FL    MCH 29.8 26.1 - 32.9 PG    MCHC 31.2 (L) 31.4 - 35.0 g/dL    RDW 15.7 (H) 11.9 - 14.6 %    Platelets 085 589 - 658 K/uL    MPV 10.2 9.4 - 12.3 FL    nRBC 0.00 0.0 - 0.2 K/uL    Differential Type AUTOMATED      Seg Neutrophils 65 43 - 78 %    Lymphocytes 15 13 - 44 %    Monocytes 9 4.0 - 12.0 %    Eosinophils % 6 0.5 - 7.8 %    Basophils 1 0.0 - 2.0 %    Immature Granulocytes 4 0.0 - 5.0 %    Segs Absolute 4.3 1.7 - 8.2 K/UL    Absolute Lymph # 1.0 0.5 - 4.6 K/UL    Absolute Mono # 0.6 0.1 - 1.3 K/UL    Absolute Eos # 0.4 0.0 - 0.8 K/UL    Basophils Absolute 0.1 0.0 - 0.2 K/UL    Absolute Immature Granulocyte 0.3 0.0 - 0.5 K/UL   Phosphorus    Collection Time: 10/21/22  4:15 AM   Result Value Ref Range    Phosphorus 3.1 2.3 - 3.7 MG/DL   Comprehensive Metabolic Panel w/ Reflex to MG    Collection Time: 10/21/22  4:15 AM   Result Value Ref Range    Sodium 139 133 - 143 mmol/L    Potassium 4.9 3.5 - 5.1 mmol/L    Chloride 108 101 - 110 mmol/L    CO2 27 21 - 32 mmol/L    Anion Gap 4 2 - 11 mmol/L    Glucose 192 (H) 65 - 100 mg/dL    BUN 30 (H) 8 - 23 MG/DL    Creatinine 1.00 0.8 - 1.5 MG/DL    Est, Glom Filt Rate >60 >60 ml/min/1.73m2    Calcium 8.7 8.3 - 10.4 MG/DL    Total Bilirubin 0.4 0.2 - 1.1 MG/DL    ALT 15 12 - 65 U/L    AST 12 (L) 15 - 37 U/L    Alk Phosphatase 75 50 - 136 U/L    Total Protein 6.0 (L) 6.3 - 8.2 g/dL    Albumin 2.5 (L) 3.2 - 4.6 g/dL    Globulin 3.5 2.8 - 4.5 g/dL    Albumin/Globulin Ratio 0.7 0.4 - 1.6     CBC with Auto Differential    Collection Time: 10/22/22  4:25 AM   Result Value Ref Range    WBC 7.1 4.3 - 11.1 K/uL    RBC 3.07 (L) 4.23 - 5.6 M/uL    Hemoglobin 9.2 (L) 13.6 - 17.2 g/dL    Hematocrit 29.2 (L) 41.1 - 50.3 %    MCV 95.1 82.0 - 102.0 FL    MCH 30.0 26.1 - 32.9 PG    MCHC 31.5 31.4 - 35.0 g/dL    RDW 15.2 (H) 11.9 - 14.6 % Platelets 474 594 - 328 K/uL    MPV 10.6 9.4 - 12.3 FL    nRBC 0.00 0.0 - 0.2 K/uL    Differential Type AUTOMATED      Seg Neutrophils 72 43 - 78 %    Lymphocytes 10 (L) 13 - 44 %    Monocytes 8 4.0 - 12.0 %    Eosinophils % 4 0.5 - 7.8 %    Basophils 1 0.0 - 2.0 %    Immature Granulocytes 5 0.0 - 5.0 %    Segs Absolute 5.2 1.7 - 8.2 K/UL    Absolute Lymph # 0.7 0.5 - 4.6 K/UL    Absolute Mono # 0.6 0.1 - 1.3 K/UL    Absolute Eos # 0.3 0.0 - 0.8 K/UL    Basophils Absolute 0.1 0.0 - 0.2 K/UL    Absolute Immature Granulocyte 0.3 0.0 - 0.5 K/UL   Comprehensive Metabolic Panel w/ Reflex to MG    Collection Time: 10/22/22  4:25 AM   Result Value Ref Range    Sodium 138 133 - 143 mmol/L    Potassium 4.6 3.5 - 5.1 mmol/L    Chloride 106 101 - 110 mmol/L    CO2 28 21 - 32 mmol/L    Anion Gap 4 2 - 11 mmol/L    Glucose 259 (H) 65 - 100 mg/dL    BUN 27 (H) 8 - 23 MG/DL    Creatinine 1.07 0.8 - 1.5 MG/DL    Est, Glom Filt Rate >60 >60 ml/min/1.73m2    Calcium 8.9 8.3 - 10.4 MG/DL    Total Bilirubin 0.3 0.2 - 1.1 MG/DL    ALT 20 12 - 65 U/L    AST 17 15 - 37 U/L    Alk Phosphatase 88 50 - 136 U/L    Total Protein 6.2 (L) 6.3 - 8.2 g/dL    Albumin 2.7 (L) 3.2 - 4.6 g/dL    Globulin 3.5 2.8 - 4.5 g/dL    Albumin/Globulin Ratio 0.8 0.4 - 1.6         I have personally reviewed imaging studies showing: Other Studies:  CT Head W/O Contrast   Final Result   Negative for acute intracranial abnormality. Chronic changes. CT CSpine W/O Contrast   Final Result   1) Negative for acute cervical spine fracture. 2) Facet arthropathy and spondylosis and carotid atherosclerosis. 3) Chronic bronchiectasis and peribronchial thickening and chronic inflammatory   change right lung apex.             CT CHEST ABDOMEN PELVIS WO CONTRAST Additional Contrast? None   Final Result   Exam limited due to lack of intravenous contrast.    1) Nondisplaced fracture anterior lip right acetabulum and fracture inferior   pubic ramus on the right with small amount of callus formation. 2) Stranding densities in the right upper quadrant, near the neck the   gallbladder, first and second portion of the duodenum and in Morison's pouch. This could be acute inflammation or posttraumatic hemorrhage. 3) Stranding densities tract along the right abdomen down into the spermatic   cord and upper scrotum on the right, inflammatory versus posttraumatic. 4) A 3.4 cm left adrenal mass, neoplasia versus adrenal hemorrhage. 5) Urinary bladder is quite distended. XR ELBOW RIGHT (MIN 3 VIEWS)   Final Result   Negative for acute fracture. Osteoarthritis.              Current Meds:  Current Facility-Administered Medications   Medication Dose Route Frequency    metFORMIN (GLUCOPHAGE) tablet 500 mg  500 mg Oral BID WC    amLODIPine (NORVASC) tablet 5 mg  5 mg Oral Daily    aspirin EC tablet 81 mg  81 mg Oral Daily    atorvastatin (LIPITOR) tablet 20 mg  20 mg Oral Nightly    vitamin B-12 (CYANOCOBALAMIN) tablet 500 mcg  500 mcg Oral Daily    insulin lispro (HUMALOG) injection vial 0-8 Units  0-8 Units SubCUTAneous TID WC    insulin lispro (HUMALOG) injection vial 0-4 Units  0-4 Units SubCUTAneous Nightly    glucose chewable tablet 16 g  4 tablet Oral PRN    dextrose bolus 10% 125 mL  125 mL IntraVENous PRN    Or    dextrose bolus 10% 250 mL  250 mL IntraVENous PRN    glucagon (rDNA) injection 1 mg  1 mg SubCUTAneous PRN    dextrose 10 % infusion   IntraVENous Continuous PRN    tamsulosin (FLOMAX) capsule 0.4 mg  0.4 mg Oral Daily    allopurinol (ZYLOPRIM) tablet 50 mg  50 mg Oral Daily    sodium zirconium cyclosilicate (LOKELMA) oral suspension 5 g  5 g Oral Every Other Day    potassium chloride (KLOR-CON M) extended release tablet 40 mEq  40 mEq Oral PRN    Or    potassium bicarb-citric acid (EFFER-K) effervescent tablet 40 mEq  40 mEq Oral PRN    Or    potassium chloride 10 mEq/100 mL IVPB (Peripheral Line)  10 mEq IntraVENous PRN    magnesium sulfate 2000 mg in 50 mL IVPB premix  2,000 mg IntraVENous PRN    sodium phosphate 10 mmol in sodium chloride 0.9 % 250 mL IVPB  10 mmol IntraVENous PRN    Or    sodium phosphate 15 mmol in sodium chloride 0.9 % 250 mL IVPB  15 mmol IntraVENous PRN    Or    sodium phosphate 20 mmol in sodium chloride 0.9 % 500 mL IVPB  20 mmol IntraVENous PRN    pantoprazole (PROTONIX) 40 mg in sodium chloride (PF) 10 mL injection  40 mg IntraVENous Daily    sodium chloride flush 0.9 % injection 5-40 mL  5-40 mL IntraVENous 2 times per day    sodium chloride flush 0.9 % injection 5-40 mL  5-40 mL IntraVENous PRN    0.9 % sodium chloride infusion   IntraVENous PRN    ondansetron (ZOFRAN-ODT) disintegrating tablet 4 mg  4 mg Oral Q8H PRN    Or    ondansetron (ZOFRAN) injection 4 mg  4 mg IntraVENous Q6H PRN    acetaminophen (TYLENOL) tablet 650 mg  650 mg Oral Q6H PRN    Or    acetaminophen (TYLENOL) suppository 650 mg  650 mg Rectal Q6H PRN       Signed:  Carina Carrillo DO    Part of this note may have been written by using a voice dictation software. The note has been proof read but may still contain some grammatical/other typographical errors.

## 2022-10-22 NOTE — PROGRESS NOTES
Patient had slight confusion this morning when first waking up. After his hearing aids were in place he was alert and oriented. He knew where he was and why he is at the hospital. Urine had a small amount of blood. 400 output. No complaints at this time. 1304 patient turned onto left side.  Pillow placed underneath back

## 2022-10-22 NOTE — PROGRESS NOTES
ACUTE PHYSICAL THERAPY GOALS:   (Developed with and agreed upon by patient and/or caregiver.)  DISCHARGE GOALS :  (1.)Mr. Suzanne Knight will move from supine to sit and sit to supine  with STAND BY ASSIST.   (2.)Mr. Suzanne Knight will transfer from bed to chair and chair to bed with STAND BY ASSIST using a walker. (3.)Mr. Suzanne Knight will ambulate with STAND BY ASSIST for 300 feet with a rolling walker. 4) pt able to go up & down 5 steps with rail & CGA, device PRN. _____________________________     PHYSICAL THERAPY Daily Note and AM  (Link to Caseload Tracking: PT Visit Days : 4  Acknowledge Orders  Time In/Out  PT Charge Capture  Rehab Caseload Tracker    Nilson Munoz is a 81 y.o. male   PRIMARY DIAGNOSIS: Sepsis with acute renal failure and tubular necrosis (HCC)  Generalized weakness [R53.1]  Closed nondisplaced fracture of anterior wall of right acetabulum, initial encounter ([address] [S32.414A]  Abdominal trauma, initial encounter [S39.91XA]  Inferior pubic ramus fracture, right, closed, initial encounter ([address] [S32.591A]  Acute renal failure, unspecified acute renal failure type ([address] [N17.9]       Reason for Referral: Generalized Muscle Weakness (M62.81)  Other lack of cordination (R27.8)  Difficulty in walking, Not elsewhere classified (R26.2)  History of falling (Z91.81)  Inpatient: Payor: [employer] / Plan: [employer] GOLD PLUS HMO / Product Type: *No Product type* /     ASSESSMENT:     REHAB RECOMMENDATIONS:   Recommendation to date pending progress:  Setting:  Inpatient [facility]  If not approved by insurance, then STR is next recommendation. Since pt lives independently and still works, then [facility] would be an excellent option for intensive therapy    Equipment:    To Be Determined     ASSESSMENT:  Mr. Suzanne Knight presents in no distress, pleasant & cooperative, participated very well with therapy.  This pt is much more limited & functioning below his reported baseline since his right acetabulum & inferior pubic rami Fx due to a fall at home. This pt is completely unsafe to return home alone, but could benefit from aggressive in-pt post acute therapy at an inpt rehab facility on hospital DC. MSW/CM aware of DC needs. He is a little hard of hearing even though he wears hearing aides. 10/22: Mr. Justin Ybarra continues to make steady progress, performing bed mobility with min A, and transferring/ambulating x 100' with RW and CGA. He was also able to progress to higher level strengthening activities today. He continues to be limited primarily by impaired balance and reduced safety awareness, likely compounded by ongoing UTI symptoms. Will continue to plan for [facility], as pt lives independently at baseline and is highly motivated to return to independence as quickly as possible.        [address] [facility] [address] [address] AM-PAC 6 Clicks Basic Mobility Inpatient Short Form  AM-PAC Mobility Inpatient   How much difficulty turning over in bed?: A Little  How much difficulty sitting down on / standing up from a chair with arms?: A Little  How much difficulty moving from lying on back to sitting on side of bed?: A Little  How much help from another person moving to and from a bed to a chair?: A Little  How much help from another person needed to walk in hospital room?: A Little  How much help from another person for climbing 3-5 steps with a railing?: Total  AM-PAC Inpatient Mobility Raw Score : 16  AM-PAC Inpatient T-Scale Score : 40.78  Mobility Inpatient CMS 0-100% Score: 54.16  Mobility Inpatient CMS G-Code Modifier : CK    SUBJECTIVE:   Mr. 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    OBJECTIVE:     PAIN: VITALS / O2: MELISSA / Aura Javier / [employer]:   Pre Treatment:          Post Treatment: 0/10 Vitals    monitored    Oxygen monitored RA      Aguilar Catheter and Telemetry     RESTRICTIONS/PRECAUTIONS:  Restrictions/Precautions: Fall Risk, Weight Bearing  Right Lower Extremity Weight Bearing: Weight Bearing As Tolerated  Left Lower Extremity Weight Bearing: Weight Bearing As Tolerated              GROSS EVALUATION: Intact Impaired (Comments):   AROM []  Decreased but functional   PROM []    Strength []  Decreased but functional   Balance []  Decreased but functional   Posture [] N/A   Sensation [x]  grossly   Coordination []   Decreased but functional   Tone []  Decreased but functional   Edema []    Activity Tolerance []  Fair-    []      COGNITION/  PERCEPTION: Intact Impaired (Comments):   Orientation [x]     Vision [x]     Hearing [x]     Cognition  [x]       MOBILITY: I Mod I S SBA CGA Min Mod Max Total  NT x2 Comments:   Bed Mobility    Rolling [] [] [] [] [] [] [] [] [] [] []    Supine to Sit [] [] [] [] [] [x] [] [] [] [] []    Scooting [] [] [] [] [x] [] [] [] [] [] []    Sit to Supine [] [] [] [] [] [] [] [] [] [] []    Transfers    Sit to Stand [] [] [] [] [x] [] [] [] [] [] []    Bed to Chair [] [] [] [] [x] [] [] [] [] [] []    Stand to Sit [] [] [] [] [x] [] [] [] [] [] []     [] [] [] [] [] [] [] [] [] [] []    I=Independent, Mod I=Modified Independent, S=Supervision, SBA=Standby Assistance, CGA=Contact Guard Assistance,   Min=Minimal Assistance, Mod=Moderate Assistance, Max=Maximal Assistance, Total=Total Assistance, NT=Not Tested    GAIT: I Mod I S SBA CGA Min Mod Max Total  NT x2 Comments:   Level of Assistance [] [] [] [] [x] [] [] [] [] [] []    Distance 100     DME Rolling Walker    Gait Quality Antalgic, Decreased rosana , Decreased step clearance, Decreased step length, and Decreased stance    Weightbearing Status      Stairs NT     I=Independent, Mod I=Modified Independent, S=Supervision, SBA=Standby Assistance, CGA=Contact Guard Assistance,   Min=Minimal Assistance, Mod=Moderate Assistance, Max=Maximal Assistance, Total=Total Assistance, NT=Not Tested    PLAN:   FREQUENCY AND DURATION: Daily for duration of hospital stay or until stated goals are met, whichever comes first.    THERAPY PROGNOSIS: Good    PROBLEM LIST:   (Skilled intervention is medically necessary to address:)  Decreased ADL/Functional Activities  Decreased Activity Tolerance  Decreased AROM/PROM  Decreased Balance  Decreased Coordination  Decreased Gait Ability  Decreased Safety Awareness  Decreased Strength  Decreased Transfer Abilities  Increased Pain INTERVENTIONS PLANNED:   (Benefits and precautions of physical therapy have been discussed with the patient.)  Self Care Training  Therapeutic Activity  Therapeutic Exercise/HEP  Gait Training  Education       TREATMENT:   EVALUATION:     TREATMENT:   Therapeutic Activity (25 Minutes): Therapeutic activity included Supine to Sit, Scooting, Transfer Training, Ambulation on level ground, Standing balance, and LE exs, positioning to improve functional Activity tolerance, Balance, Coordination, Mobility, Strength, and ROM.     TREATMENT GRID:   Date:  10/20/22 Date:  10/21/22 Date:  10/22   Activity/Exercise Parameters Parameters Parameters   RECLINED: ankle DF/PF 10 15         Hip AB/AD 10 15         Heels Slides 10AA 15         SLR 10 15    SITTING: LAQ 10 15 2 x 10   STANDING: Sit to stand   2 x 10 with UE support   Heel raises with BUE support   2 x 10       AFTER TREATMENT PRECAUTIONS: Bed/Chair Locked, Call light within reach, Chair, Needs within reach, and RN notified    INTERDISCIPLINARY COLLABORATION:  RN/ PCT and PT/ PTA    EDUCATION:      TIME IN/OUT:  Time In: 1355  Time Out: [address]  Minutes: [address] [place], PT

## 2022-10-23 LAB
ANION GAP SERPL CALC-SCNC: 4 MMOL/L (ref 2–11)
BACTERIA SPEC CULT: NORMAL
BACTERIA SPEC CULT: NORMAL
BUN SERPL-MCNC: 28 MG/DL (ref 8–23)
CALCIUM SERPL-MCNC: 8.9 MG/DL (ref 8.3–10.4)
CHLORIDE SERPL-SCNC: 106 MMOL/L (ref 101–110)
CO2 SERPL-SCNC: 28 MMOL/L (ref 21–32)
CREAT SERPL-MCNC: 1.06 MG/DL (ref 0.8–1.5)
DIFFERENTIAL METHOD BLD: ABNORMAL
EOSINOPHIL # BLD: 0.1 K/UL (ref 0–0.8)
EOSINOPHIL NFR BLD MANUAL: 2 % (ref 1–8)
ERYTHROCYTE [DISTWIDTH] IN BLOOD BY AUTOMATED COUNT: 15.3 % (ref 11.9–14.6)
GLUCOSE BLD STRIP.AUTO-MCNC: 205 MG/DL (ref 65–100)
GLUCOSE BLD STRIP.AUTO-MCNC: 210 MG/DL (ref 65–100)
GLUCOSE BLD STRIP.AUTO-MCNC: 226 MG/DL (ref 65–100)
GLUCOSE BLD STRIP.AUTO-MCNC: 278 MG/DL (ref 65–100)
GLUCOSE SERPL-MCNC: 247 MG/DL (ref 65–100)
HCT VFR BLD AUTO: 29.6 % (ref 41.1–50.3)
HGB BLD-MCNC: 9.4 G/DL (ref 13.6–17.2)
LYMPHOCYTES # BLD: 1 K/UL (ref 0.5–4.6)
LYMPHOCYTES NFR BLD MANUAL: 14 % (ref 16–44)
MCH RBC QN AUTO: 30.3 PG (ref 26.1–32.9)
MCHC RBC AUTO-ENTMCNC: 31.8 G/DL (ref 31.4–35)
MCV RBC AUTO: 95.5 FL (ref 82–102)
METAMYELOCYTES NFR BLD MANUAL: 1 %
MONOCYTES # BLD: 0.6 K/UL (ref 0.1–1.3)
MONOCYTES NFR BLD MANUAL: 8 % (ref 3–9)
MYELOCYTES NFR BLD MANUAL: 7 %
NEUTS SEG # BLD: 5.4 K/UL (ref 1.7–8.2)
NEUTS SEG NFR BLD MANUAL: 67 % (ref 47–75)
NRBC # BLD: 0 K/UL (ref 0–0.2)
PLATELET # BLD AUTO: 208 K/UL (ref 150–450)
PLATELET COMMENT: ADEQUATE
PMV BLD AUTO: 10.8 FL (ref 9.4–12.3)
POTASSIUM SERPL-SCNC: 4.7 MMOL/L (ref 3.5–5.1)
PROMYELOCYTES NFR BLD MANUAL: 1 %
RBC # BLD AUTO: 3.1 M/UL (ref 4.23–5.6)
RBC MORPH BLD: ABNORMAL
RBC MORPH BLD: ABNORMAL
SERVICE CMNT-IMP: ABNORMAL
SERVICE CMNT-IMP: NORMAL
SERVICE CMNT-IMP: NORMAL
SODIUM SERPL-SCNC: 138 MMOL/L (ref 133–143)
WBC # BLD AUTO: 7.1 K/UL (ref 4.3–11.1)

## 2022-10-23 PROCEDURE — 36415 COLL VENOUS BLD VENIPUNCTURE: CPT

## 2022-10-23 PROCEDURE — 80048 BASIC METABOLIC PNL TOTAL CA: CPT

## 2022-10-23 PROCEDURE — 82962 GLUCOSE BLOOD TEST: CPT

## 2022-10-23 PROCEDURE — 85025 COMPLETE CBC W/AUTO DIFF WBC: CPT

## 2022-10-23 PROCEDURE — 6370000000 HC RX 637 (ALT 250 FOR IP): Performed by: INTERNAL MEDICINE

## 2022-10-23 PROCEDURE — 2580000003 HC RX 258: Performed by: NURSE PRACTITIONER

## 2022-10-23 PROCEDURE — 51798 US URINE CAPACITY MEASURE: CPT

## 2022-10-23 PROCEDURE — 1100000000 HC RM PRIVATE

## 2022-10-23 PROCEDURE — 51701 INSERT BLADDER CATHETER: CPT

## 2022-10-23 RX ORDER — FUROSEMIDE 20 MG/1
20 TABLET ORAL DAILY
Status: DISCONTINUED | OUTPATIENT
Start: 2022-10-24 | End: 2022-10-25 | Stop reason: HOSPADM

## 2022-10-23 RX ADMIN — PANTOPRAZOLE SODIUM 40 MG: 40 TABLET, DELAYED RELEASE ORAL at 06:27

## 2022-10-23 RX ADMIN — CYANOCOBALAMIN TAB 1000 MCG 500 MCG: 1000 TAB at 08:26

## 2022-10-23 RX ADMIN — AMLODIPINE BESYLATE 5 MG: 5 TABLET ORAL at 08:26

## 2022-10-23 RX ADMIN — ASPIRIN 81 MG: 81 TABLET, COATED ORAL at 08:26

## 2022-10-23 RX ADMIN — ATORVASTATIN CALCIUM 20 MG: 10 TABLET, FILM COATED ORAL at 19:58

## 2022-10-23 RX ADMIN — TAMSULOSIN HYDROCHLORIDE 0.4 MG: 0.4 CAPSULE ORAL at 08:26

## 2022-10-23 RX ADMIN — SODIUM CHLORIDE, PRESERVATIVE FREE 10 ML: 5 INJECTION INTRAVENOUS at 08:27

## 2022-10-23 RX ADMIN — SODIUM CHLORIDE, PRESERVATIVE FREE 10 ML: 5 INJECTION INTRAVENOUS at 21:27

## 2022-10-23 RX ADMIN — INSULIN LISPRO 2 UNITS: 100 INJECTION, SOLUTION INTRAVENOUS; SUBCUTANEOUS at 08:27

## 2022-10-23 RX ADMIN — INSULIN LISPRO 2 UNITS: 100 INJECTION, SOLUTION INTRAVENOUS; SUBCUTANEOUS at 11:53

## 2022-10-23 RX ADMIN — ALLOPURINOL 50 MG: 100 TABLET ORAL at 08:24

## 2022-10-23 RX ADMIN — METFORMIN HYDROCHLORIDE 500 MG: 500 TABLET ORAL at 16:14

## 2022-10-23 RX ADMIN — METFORMIN HYDROCHLORIDE 500 MG: 500 TABLET ORAL at 08:26

## 2022-10-23 RX ADMIN — INSULIN LISPRO 2 UNITS: 100 INJECTION, SOLUTION INTRAVENOUS; SUBCUTANEOUS at 16:14

## 2022-10-23 NOTE — PROGRESS NOTES
Aguilar catheter removed. Instructed patient to use urinal and call for assistance to obtain measurement of urine output. Pt verbalized understanding.

## 2022-10-23 NOTE — CARE COORDINATION
[facility] with [facility] here, said they need MD [id number]-cpui called to [phone], opt. 5, ref. [id number]. Perfect Serve message sent to Dr. Mariya Steel.

## 2022-10-23 NOTE — PROGRESS NOTES
Hospitalist Progress Note   Admit Date:  10/18/2022 12:14 PM   Name:  Shaggy Talamantes   Age:  81 y.o. Sex:  male  :  1935   MRN:  [id number]   Room:  [facility]/    Presenting Complaint: Fatigue     Reason(s) for Admission: Generalized weakness [R53.1]  Closed nondisplaced fracture of anterior wall of right acetabulum, initial encounter ([facility] [address] [S32.414A]  Abdominal trauma, initial encounter [S39.91XA]  Inferior pubic ramus fracture, right, closed, initial encounter ([facility] [address] [S32.591A]  Acute renal failure, unspecified acute renal failure type [facility]) [N17.9]     Hospital Course:     Copied from prior provider HPI/summary of events:  81 y.o. male with medical history of  DM II, HTN, hyperkalemia secondary to bactrim use who presented with c/o generalized fatigue and increased urinary frequency. Pt reports a fall backwards down his brick stairs about 2 weeks ago and c/o abd pain since. Started having urinary incontinence last night. CT head neg for acute findings. CT c-spine neg for acute findings. CT chest/abd/pelvis with extensive findings of abdominal trauma (see full report below). Also found to have an right inferior pubic ramus fx and nondisplaced fx of the anterior wall of the right acetabulum. Ortho surgery (Dr. Nadeem Whitman) consulted per ER. General Surgery (Dr. Krish Martinez) contacted per ER. BC sent. Also with TIGIST with creatinine at 3.28 baseline around 1.3. Pt hemodynamically stable. Denies CP, SOB, n/v/d. Subjective & 24hr Events (10/23/22): Patient awaiting bed approval for inpatient rehab-fall with pubic ramus fracture acetabular fracture and abdominal trauma. Abdominal trauma treated conservatively and appears to be clinically stable. Patient seen by general surgery  and conservative therapy recommended. Seen by orthopedics in consultation  and nonoperative-needs extensive therapy and assistance with ADLs.   Patient seen by urology  regarding urine retention and recommended Aguilar catheter for at least 3 to 5 days with consideration of catheter removal and voiding trial at that time. Today appears to be day 5-patient has been utilizing Flomax will order DC Aguilar catheter with bladder scan for PVR every shift. Vital signs stable creatinine stable with resolution of TIGIST and patient without new complaints --in particular denies chest pain, dyspnea, dyspnea exertion, denies change in bowel habits-Aguilar catheter without acute difficulties. Patient tolerating resumption of his home metformin twice daily and we continue to hold his Januvia he has Tolerated resumed home Lipitor and amlodipine. Assessment & Plan:   * Sepsis with acute renal failure and tubular necrosis (HCC)  Assessment & Plan  Admission criteria met with HR 96, RR 41, ruq CT findings. -empiric antibiotics (pip-tazo)  -follow cultures  10/23--resolved-awaiting skilled facility placement. Cultures not helpful-now off antibiotics. Gout  Assessment & Plan  -hold allopurinol  10/22--resumed low-dose allopurinol October 21. Acute renal failure with acute tubular necrosis superimposed on stage 3a chronic kidney disease ([address]  Assessment & Plan  10/21--resolved-follow     Abnormal CT abd with RUQ inflammation  Assessment & Plan  Admission CT with \"stranding densities in the right upper quadrant adjacent to the gallbladder and first and second portion of the duodenum and in Morison's pouch. This tracks inferiorly. \"  -consult general surgery: concern for duodenitis  10/22--- stable with conservative therapy- 10/23--continue observation. Diabetes mellitus type 2, controlled ([address]  Assessment & Plan  -hold metformin, sitagliptin  -sliding scale insulin  10/22--restart metformin twice daily-sliding scale prandial insulin coverage and continue to hold Januvia for now  10/23--continue current management.      3.4cm left adrenal mass noted incidentally on CT 10/18/22  Assessment & Plan  -10/23--outpatient follow-up urology. Will need to be arranged at discharge if appointment not already made     Hyperlipidemia  Assessment & Plan  -10/22-resume atorvastatin-renal function improved. Urinary retention  Assessment & Plan  -haines  10/20/2022: voiding trial 10/22 or 10/23, OP FU with urology  10/21--will need outpatient urology follow-up. 10/22--unchanged  10/23--discontinue Haines catheter-bladder scan for postvoid residual every shift x4 notify if greater than 125 cc--if need to reinsert Haines catheter will need urology follow-up     Generalized weakness  Assessment & Plan  -PT, OT, PPD  10/21--for inpatient rehab.  10/23--awaiting insurance approval.     Fall  Assessment & Plan  -fall precuations     Closed nondisplaced fracture of anterior wall of right acetabulum [facility])  Assessment & Plan  -consult orthopedic surgery: conservative management  10/21--continue same-physical therapy. 10/22-plan unchanged  10/23--see physical and Occupational Therapy documentation-patient requiring extensive therapy-awaiting inpatient rehab facility approval     Primary hypertension  Assessment & Plan  -amlodipine, [id number]--resume home amlodipine-restart furosemide tomorrow if remains clinically stable  10/23--resume furosemide in a.m.-lower dose                            Anticipated discharge needs:      Short term rehab     Diet:  ADULT DIET;  Regular; 4 carb choices (60 gm/meal)  ADULT ORAL NUTRITION SUPPLEMENT; Breakfast, Lunch, Dinner; Diabetic Oral Supplement  DVT PPx: SCDs  Code status: Full Code            Hospital Problems:  Principal Problem:    Sepsis with acute renal failure and tubular necrosis (HCC)  Active Problems:    Skin tear of elbow without complication    Abnormal CT abd with RUQ inflammation    Closed nondisplaced fracture of anterior wall of right acetabulum (HCC)    Inferior pubic ramus fracture, right, closed, initial encounter ([facility] [address]    Acute renal failure with acute tubular necrosis superimposed on stage 3a chronic kidney disease ([facility] [address]    Fall    Contusion of abdominal wall    Generalized weakness    Closed nondisplaced fracture of anterior wall of right acetabulum with delayed healing    Urinary retention    Hyperlipidemia    Gout    Chronic kidney disease, stage 3a (HCC)    3.4cm left adrenal mass noted incidentally on CT 10/18/22    Normocytic anemia    Primary hypertension    Diabetes mellitus type 2, controlled ([facility] [address]  Resolved Problems:    * No resolved hospital problems. *      Objective:   Patient Vitals for the past 24 hrs:   Temp Pulse Resp BP SpO2   10/23/22 1123 97.3 °F (36.3 °C) 88 12 (!) 98/51 97 %   10/23/22 0740 98.6 °F (37 °C) 85 18 120/64 97 %   10/23/22 0326 98.1 °F (36.7 °C) 92 20 124/66 94 %   10/22/22 2327 97.9 °F (36.6 °C) 94 18 (!) 120/57 93 %   10/22/22 1927 98.2 °F (36.8 °C) 100 18 (!) 97/58 96 %   10/22/22 1549 98.4 °F (36.9 °C) 98 18 116/62 96 %         Oxygen Therapy  SpO2: 97 %  Pulse Oximetry Type: Continuous  Pulse via Oximetry: 88 beats per minute  SPO2 High Alarm Limit: 100  SPO2 Low Alarm Limit POX: 90  Pulse Oximeter Device Mode: Continuous  Pulse Oximeter Device Location: Right, Hand, Finger  O2 Device: None (Room air)  Oximetry Probe Site Changed: No  Skin Assessment: Clean, dry, & intact  Skin Protection for O2 Device: N/A  O2 Flow Rate (L/min): 0 L/min  Oxygen Therapy: Supplemental oxygen  O2 Delivery Method: Nasal cannula    Estimated body mass index is 18.73 kg/m² as calculated from the following:    Height as of this encounter: 6' 1\" (1.854 m). Weight as of this encounter: 142 lb (64.4 kg). Intake/Output Summary (Last 24 hours) at 10/23/2022 1508  Last data filed at 10/23/2022 1327  Gross per 24 hour   Intake --   Output 1650 ml   Net -1650 ml           Physical Exam:     Blood pressure (!) 98/51, pulse 88, temperature 97.3 °F (36.3 °C), resp. rate 12, height 6' 1\" (1.854 m), weight 142 lb (64.4 kg), SpO2 97 %.   physical Exam:   General: Alert, cooperative, no distress, appears stated age. Eyes:                                           Conjunctivae/corneas clear. Pupils equally round and reactive to light, extraocular movements intact. Lungs:                       Clear to auscultation bilaterally. No wheezing at time of exam.  Heart:                     Regular rate and rhythm, S1, S2 normal, no murmur, click, rub or gallop. Abdomen:                       Soft, non-tender. Bowel sounds normal. No masses,  No organomegaly. Aguilar catheter with clear urine  Extremities:                     Extremities normal, atraumatic, no cyanosis or increase in lower extremity edema  Neurologic:                     CNII-XII intact. Moves all extremities.   No tremor    I have personally reviewed labs and tests showing:  Recent Labs:  Recent Results (from the past 48 hour(s))   CBC with Auto Differential    Collection Time: 10/22/22  4:25 AM   Result Value Ref Range    WBC 7.1 4.3 - 11.1 K/uL    RBC 3.07 (L) 4.23 - 5.6 M/uL    Hemoglobin 9.2 (L) 13.6 - 17.2 g/dL    Hematocrit 29.2 (L) 41.1 - 50.3 %    MCV 95.1 82.0 - 102.0 FL    MCH 30.0 26.1 - 32.9 PG    MCHC 31.5 31.4 - 35.0 g/dL    RDW 15.2 (H) 11.9 - 14.6 %    Platelets 262 977 - 460 K/uL    MPV 10.6 9.4 - 12.3 FL    nRBC 0.00 0.0 - 0.2 K/uL    Differential Type AUTOMATED      Seg Neutrophils 72 43 - 78 %    Lymphocytes 10 (L) 13 - 44 %    Monocytes 8 4.0 - 12.0 %    Eosinophils % 4 0.5 - 7.8 %    Basophils 1 0.0 - 2.0 %    Immature Granulocytes 5 0.0 - 5.0 %    Segs Absolute 5.2 1.7 - 8.2 K/UL    Absolute Lymph # 0.7 0.5 - 4.6 K/UL    Absolute Mono # 0.6 0.1 - 1.3 K/UL    Absolute Eos # 0.3 0.0 - 0.8 K/UL    Basophils Absolute 0.1 0.0 - 0.2 K/UL    Absolute Immature Granulocyte 0.3 0.0 - 0.5 K/UL   Comprehensive Metabolic Panel w/ Reflex to MG    Collection Time: 10/22/22  4:25 AM   Result Value Ref Range    Sodium 138 133 - 143 mmol/L    Potassium 4.6 3.5 - 5.1 mmol/L    Chloride 106 101 - 110 mmol/L    CO2 28 21 - 32 mmol/L    Anion Gap 4 2 - 11 mmol/L    Glucose 259 (H) 65 - 100 mg/dL    BUN 27 (H) 8 - 23 MG/DL    Creatinine 1.07 0.8 - 1.5 MG/DL    Est, Glom Filt Rate >60 >60 ml/min/1.73m2    Calcium 8.9 8.3 - 10.4 MG/DL    Total Bilirubin 0.3 0.2 - 1.1 MG/DL    ALT 20 12 - 65 U/L    AST 17 15 - 37 U/L    Alk Phosphatase 88 50 - 136 U/L    Total Protein 6.2 (L) 6.3 - 8.2 g/dL    Albumin 2.7 (L) 3.2 - 4.6 g/dL    Globulin 3.5 2.8 - 4.5 g/dL    Albumin/Globulin Ratio 0.8 0.4 - 1.6     POCT Glucose    Collection Time: 10/22/22  4:46 PM   Result Value Ref Range    POC Glucose 326 (H) 65 - 100 mg/dL    Performed by: Eda    POCT Glucose    Collection Time: 10/22/22  9:07 PM   Result Value Ref Range    POC Glucose 190 (H) 65 - 100 mg/dL    Performed by: Rl Alonzo    CBC with Auto Differential    Collection Time: 10/23/22  3:44 AM   Result Value Ref Range    WBC 7.1 4.3 - 11.1 K/uL    RBC 3.10 (L) 4.23 - 5.6 M/uL    Hemoglobin 9.4 (L) 13.6 - 17.2 g/dL    Hematocrit 29.6 (L) 41.1 - 50.3 %    MCV 95.5 82.0 - 102.0 FL    MCH 30.3 26.1 - 32.9 PG    MCHC 31.8 31.4 - 35.0 g/dL    RDW 15.3 (H) 11.9 - 14.6 %    Platelets 391 508 - 049 K/uL    MPV 10.8 9.4 - 12.3 FL    nRBC 0.00 0.0 - 0.2 K/uL    Seg Neutrophils 67 47 - 75 %    Lymphocytes 14 (L) 16 - 44 %    Monocytes 8 3 - 9 %    Eosinophils % 2 1 - 8 %    Metamyelocytes 1 %    Myelocytes 7 %    Promyelocytes 1 %    Segs Absolute 5.4 1.7 - 8.2 K/UL    Absolute Lymph # 1.0 0.5 - 4.6 K/UL    Absolute Mono # 0.6 0.1 - 1.3 K/UL    Absolute Eos # 0.1 0.0 - 0.8 K/UL    RBC Comment OCCASIONAL  ANISOCYTOSIS        RBC Comment OCCASIONAL  POLYCHROMASIA        Platelet Comment ADEQUATE      Differential Type MANUAL     Basic Metabolic Panel    Collection Time: 10/23/22  3:44 AM   Result Value Ref Range    Sodium 138 133 - 143 mmol/L    Potassium 4.7 3.5 - 5.1 mmol/L    Chloride 106 101 - 110 mmol/L    CO2 28 21 - 32 mmol/L    Anion Gap 4 2 - 11 mmol/L    Glucose 247 (H) 65 - 100 mg/dL    BUN 28 (H) 8 - 23 MG/DL    Creatinine 1.06 0.8 - 1.5 MG/DL    Est, Glom Filt Rate >60 >60 ml/min/1.73m2    Calcium 8.9 8.3 - 10.4 MG/DL   POCT Glucose    Collection Time: 10/23/22  6:33 AM   Result Value Ref Range    POC Glucose 205 (H) 65 - 100 mg/dL    Performed by: Mickiel Officer    POCT Glucose    Collection Time: 10/23/22 11:25 AM   Result Value Ref Range    POC Glucose 210 (H) 65 - 100 mg/dL    Performed by: Jean-Pierre Black        I have personally reviewed imaging studies showing: Other Studies:  CT Head W/O Contrast   Final Result   Negative for acute intracranial abnormality. Chronic changes. CT CSpine W/O Contrast   Final Result   1) Negative for acute cervical spine fracture. 2) Facet arthropathy and spondylosis and carotid atherosclerosis. 3) Chronic bronchiectasis and peribronchial thickening and chronic inflammatory   change right lung apex. CT CHEST ABDOMEN PELVIS WO CONTRAST Additional Contrast? None   Final Result   Exam limited due to lack of intravenous contrast.    1) Nondisplaced fracture anterior lip right acetabulum and fracture inferior   pubic ramus on the right with small amount of callus formation. 2) Stranding densities in the right upper quadrant, near the neck the   gallbladder, first and second portion of the duodenum and in Morison's pouch. This could be acute inflammation or posttraumatic hemorrhage. 3) Stranding densities tract along the right abdomen down into the spermatic   cord and upper scrotum on the right, inflammatory versus posttraumatic. 4) A 3.4 cm left adrenal mass, neoplasia versus adrenal hemorrhage. 5) Urinary bladder is quite distended. XR ELBOW RIGHT (MIN 3 VIEWS)   Final Result   Negative for acute fracture. Osteoarthritis.              Current Meds:  Current Facility-Administered Medications   Medication Dose Route Frequency    metFORMIN (GLUCOPHAGE) tablet 500 mg  500 mg Oral BID     amLODIPine (NORVASC) tablet 5 mg  5 mg Oral Daily    aspirin EC tablet 81 mg  81 mg Oral Daily    atorvastatin (LIPITOR) tablet 20 mg  20 mg Oral Nightly    vitamin B-12 (CYANOCOBALAMIN) tablet 500 mcg  500 mcg Oral Daily    insulin lispro (HUMALOG) injection vial 0-8 Units  0-8 Units SubCUTAneous TID WC    insulin lispro (HUMALOG) injection vial 0-4 Units  0-4 Units SubCUTAneous Nightly    glucose chewable tablet 16 g  4 tablet Oral PRN    dextrose bolus 10% 125 mL  125 mL IntraVENous PRN    Or    dextrose bolus 10% 250 mL  250 mL IntraVENous PRN    glucagon (rDNA) injection 1 mg  1 mg SubCUTAneous PRN    dextrose 10 % infusion   IntraVENous Continuous PRN    pantoprazole (PROTONIX) tablet 40 mg  40 mg Oral QAM AC    sennosides-docusate sodium (SENOKOT-S) 8.6-50 MG tablet 2 tablet  2 tablet Oral Daily PRN    tamsulosin (FLOMAX) capsule 0.4 mg  0.4 mg Oral Daily    allopurinol (ZYLOPRIM) tablet 50 mg  50 mg Oral Daily    sodium zirconium cyclosilicate (LOKELMA) oral suspension 5 g  5 g Oral Every Other Day    potassium chloride (KLOR-CON M) extended release tablet 40 mEq  40 mEq Oral PRN    Or    potassium bicarb-citric acid (EFFER-K) effervescent tablet 40 mEq  40 mEq Oral PRN    Or    potassium chloride 10 mEq/100 mL IVPB (Peripheral Line)  10 mEq IntraVENous PRN    magnesium sulfate 2000 mg in 50 mL IVPB premix  2,000 mg IntraVENous PRN    sodium phosphate 10 mmol in sodium chloride 0.9 % 250 mL IVPB  10 mmol IntraVENous PRN    Or    sodium phosphate 15 mmol in sodium chloride 0.9 % 250 mL IVPB  15 mmol IntraVENous PRN    Or    sodium phosphate 20 mmol in sodium chloride 0.9 % 500 mL IVPB  20 mmol IntraVENous PRN    sodium chloride flush 0.9 % injection 5-40 mL  5-40 mL IntraVENous 2 times per day    sodium chloride flush 0.9 % injection 5-40 mL  5-40 mL IntraVENous PRN    0.9 % sodium chloride infusion   IntraVENous PRN    ondansetron (ZOFRAN-ODT) disintegrating tablet 4 mg  4 mg Oral Q8H PRN Or    ondansetron (ZOFRAN) injection 4 mg  4 mg IntraVENous Q6H PRN    acetaminophen (TYLENOL) tablet 650 mg  650 mg Oral Q6H PRN    Or    acetaminophen (TYLENOL) suppository 650 mg  650 mg Rectal Q6H PRN       Signed:  Janna Patel DO    Part of this note may have been written by using a voice dictation software. The note has been proof read but may still contain some grammatical/other typographical errors.

## 2022-10-24 PROBLEM — E44.0 MODERATE PROTEIN-CALORIE MALNUTRITION (HCC): Chronic | Status: ACTIVE | Noted: 2022-10-24

## 2022-10-24 PROBLEM — F03.B0 MODERATE DEMENTIA WITHOUT BEHAVIORAL DISTURBANCE, PSYCHOTIC DISTURBANCE, MOOD DISTURBANCE, OR ANXIETY: Status: ACTIVE | Noted: 2022-10-24

## 2022-10-24 LAB
ANION GAP SERPL CALC-SCNC: 7 MMOL/L (ref 2–11)
BASOPHILS # BLD: 0.2 K/UL (ref 0–0.2)
BASOPHILS NFR BLD MANUAL: 2 % (ref 0–2)
BUN SERPL-MCNC: 37 MG/DL (ref 8–23)
CALCIUM SERPL-MCNC: 8.7 MG/DL (ref 8.3–10.4)
CHLORIDE SERPL-SCNC: 103 MMOL/L (ref 101–110)
CO2 SERPL-SCNC: 28 MMOL/L (ref 21–32)
CREAT SERPL-MCNC: 1.18 MG/DL (ref 0.8–1.5)
DIFFERENTIAL METHOD BLD: ABNORMAL
EOSINOPHIL # BLD: 0.7 K/UL (ref 0–0.8)
EOSINOPHIL NFR BLD MANUAL: 9 % (ref 1–8)
ERYTHROCYTE [DISTWIDTH] IN BLOOD BY AUTOMATED COUNT: 15.1 % (ref 11.9–14.6)
GLUCOSE BLD STRIP.AUTO-MCNC: 203 MG/DL (ref 65–100)
GLUCOSE BLD STRIP.AUTO-MCNC: 206 MG/DL (ref 65–100)
GLUCOSE BLD STRIP.AUTO-MCNC: 215 MG/DL (ref 65–100)
GLUCOSE BLD STRIP.AUTO-MCNC: 269 MG/DL (ref 65–100)
GLUCOSE SERPL-MCNC: 189 MG/DL (ref 65–100)
HCT VFR BLD AUTO: 29.6 % (ref 41.1–50.3)
HGB BLD-MCNC: 9.4 G/DL (ref 13.6–17.2)
LYMPHOCYTES # BLD: 0.8 K/UL (ref 0.5–4.6)
LYMPHOCYTES NFR BLD MANUAL: 11 % (ref 16–44)
MCH RBC QN AUTO: 30.3 PG (ref 26.1–32.9)
MCHC RBC AUTO-ENTMCNC: 31.8 G/DL (ref 31.4–35)
MCV RBC AUTO: 95.5 FL (ref 82–102)
METAMYELOCYTES NFR BLD MANUAL: 1 %
MONOCYTES # BLD: 0.5 K/UL (ref 0.1–1.3)
MONOCYTES NFR BLD MANUAL: 7 % (ref 3–9)
MYELOCYTES NFR BLD MANUAL: 3 %
NEUTS BAND NFR BLD MANUAL: 7 % (ref 0–6)
NEUTS SEG # BLD: 5.2 K/UL (ref 1.7–8.2)
NEUTS SEG NFR BLD MANUAL: 60 % (ref 47–75)
NRBC # BLD: 0 K/UL (ref 0–0.2)
PLATELET # BLD AUTO: 211 K/UL (ref 150–450)
PLATELET COMMENT: ADEQUATE
PMV BLD AUTO: 10.1 FL (ref 9.4–12.3)
POTASSIUM SERPL-SCNC: 4.6 MMOL/L (ref 3.5–5.1)
RBC # BLD AUTO: 3.1 M/UL (ref 4.23–5.6)
RBC MORPH BLD: ABNORMAL
SERVICE CMNT-IMP: ABNORMAL
SODIUM SERPL-SCNC: 138 MMOL/L (ref 133–143)
WBC # BLD AUTO: 7.6 K/UL (ref 4.3–11.1)
WBC MORPH BLD: SLIGHT

## 2022-10-24 PROCEDURE — 85025 COMPLETE CBC W/AUTO DIFF WBC: CPT

## 2022-10-24 PROCEDURE — 6370000000 HC RX 637 (ALT 250 FOR IP): Performed by: INTERNAL MEDICINE

## 2022-10-24 PROCEDURE — 97535 SELF CARE MNGMENT TRAINING: CPT

## 2022-10-24 PROCEDURE — 82962 GLUCOSE BLOOD TEST: CPT

## 2022-10-24 PROCEDURE — 6370000000 HC RX 637 (ALT 250 FOR IP): Performed by: FAMILY MEDICINE

## 2022-10-24 PROCEDURE — 80048 BASIC METABOLIC PNL TOTAL CA: CPT

## 2022-10-24 PROCEDURE — 36415 COLL VENOUS BLD VENIPUNCTURE: CPT

## 2022-10-24 PROCEDURE — 2580000003 HC RX 258: Performed by: NURSE PRACTITIONER

## 2022-10-24 PROCEDURE — 1100000000 HC RM PRIVATE

## 2022-10-24 PROCEDURE — 92523 SPEECH SOUND LANG COMPREHEN: CPT

## 2022-10-24 PROCEDURE — 97530 THERAPEUTIC ACTIVITIES: CPT

## 2022-10-24 RX ADMIN — ALLOPURINOL 50 MG: 100 TABLET ORAL at 10:16

## 2022-10-24 RX ADMIN — CYANOCOBALAMIN TAB 1000 MCG 500 MCG: 1000 TAB at 10:16

## 2022-10-24 RX ADMIN — ASPIRIN 81 MG: 81 TABLET, COATED ORAL at 10:15

## 2022-10-24 RX ADMIN — INSULIN LISPRO 2 UNITS: 100 INJECTION, SOLUTION INTRAVENOUS; SUBCUTANEOUS at 07:51

## 2022-10-24 RX ADMIN — SODIUM CHLORIDE, PRESERVATIVE FREE 10 ML: 5 INJECTION INTRAVENOUS at 10:24

## 2022-10-24 RX ADMIN — INSULIN LISPRO 4 UNITS: 100 INJECTION, SOLUTION INTRAVENOUS; SUBCUTANEOUS at 11:45

## 2022-10-24 RX ADMIN — INSULIN LISPRO 2 UNITS: 100 INJECTION, SOLUTION INTRAVENOUS; SUBCUTANEOUS at 17:12

## 2022-10-24 RX ADMIN — TAMSULOSIN HYDROCHLORIDE 0.4 MG: 0.4 CAPSULE ORAL at 10:15

## 2022-10-24 RX ADMIN — METFORMIN HYDROCHLORIDE 500 MG: 500 TABLET ORAL at 17:33

## 2022-10-24 RX ADMIN — SODIUM ZIRCONIUM CYCLOSILICATE 5 G: 5 POWDER, FOR SUSPENSION ORAL at 10:35

## 2022-10-24 RX ADMIN — FUROSEMIDE 20 MG: 20 TABLET ORAL at 10:15

## 2022-10-24 RX ADMIN — ATORVASTATIN CALCIUM 20 MG: 10 TABLET, FILM COATED ORAL at 20:42

## 2022-10-24 RX ADMIN — AMLODIPINE BESYLATE 5 MG: 5 TABLET ORAL at 10:15

## 2022-10-24 RX ADMIN — METFORMIN HYDROCHLORIDE 500 MG: 500 TABLET ORAL at 07:52

## 2022-10-24 RX ADMIN — SODIUM CHLORIDE, PRESERVATIVE FREE 10 ML: 5 INJECTION INTRAVENOUS at 20:51

## 2022-10-24 ASSESSMENT — PAIN SCALES - GENERAL
PAINLEVEL_OUTOF10: 0

## 2022-10-24 ASSESSMENT — PAIN SCALES - WONG BAKER: WONGBAKER_NUMERICALRESPONSE: 0

## 2022-10-24 NOTE — PROGRESS NOTES
immediate/delayed recall, mental manipulation, thought organization, problem solving, and reasoning skills. Required frequent repetition of questions/prompts during assessment due to impaired immediate recall. Identified solutions to problems that could occur in home environment with minimal verbal cues, but exhibited significant difficulty with completing higher level problem solving for basic math calculations regarding time and money. Patient is currently functioning below baseline in regards to cognitive linguistic abilities. Was living at home alone and independent with all ADLs. Recommend ongoing speech therapy services during acute hospitalization and at next level of care. GENERAL      Communication Observation: Functional (speech minimally slurred)  Patient Positioning: Upright in bed     Respiratory Status: Room air                       Pain:   Patient does not c/o pain                                          Social/Functional History  Lives With: Alone  Type of Home: Condo  Home Layout: One level  ADL Assistance: Independent  Homemaking Assistance: Independent  Homemaking Responsibilities: Yes  Meal Prep Responsibility: Primary  Laundry Responsibility: Primary  Cleaning Responsibility: Primary  Bill Paying/Finance Responsibility: Primary  Shopping Responsibility: Primary  Health Care Management: Primary  Ambulation Assistance: Independent  Active : Yes  Mode of Transportation: Car    OBJECTIVE   SLUMS and other measures completed to evaluate cognitive linguistic functioning: Total score: 13/30  Orientation-3/3  Recall(5 words)- immediate: 2/5; delayed:2/5  Problem solvin/3 - able to state how to calculate answer, but unable to complete calculation  Divergent naming(concrete category): 1/3 - named 7 animal in 1 minute  Digit manipulation: 0/2 - able to recall numbers in 3 number sequence, but unable put in reverse order. Unable to recall all 4 numbers in 4 number sequence. Visuospatial: 2/  Clock drawin/4 - correct placement of number on clock. When asked to draw hands on the clock to show \"10 minute after 11\" patient circled the number 10 and number 1. Immediate recall (passage)-      Patient was administered portions of the Communication/Cognitive Brief Evaluation:    Complex yes/no questions:   Identifying solution to daily problems in home environment: 3/4  Reasoning for math/time:        PLAN    Duration/Frequency: Continue to follow patient 2x/week for duration of hospitalization and/or until goals met    Speech Therapy Prognosis  Prognosis: Excellent    MODIFIED GEORGE SCALE (mRS) SCORE: 3  Interpretation of Tool: The Modified George Scale is a scale used to quantify level of disability as it relates to a patient's functional abilities. No Symptoms(0); No significant disability despite symptoms; able to carry out all usual duties and activities(1); Slight disability; unable to carry out all previous activities but able to look after own affairs without assistance(2); Moderate disability; requiring some help but able to walk without assistance(3); Moderately severe disability; unable to walk without assistance and unable to attend to own bodily needs without assistance(4); Severe disability; bedridden, incontinent, and requiring constant nursing care and attention(5)      Education: Patient, Family member  Patient Education: SLP role. Plans for ongoing cognitive linguistic treatment during acute hospitalization and at next level of care  Patient Education Response: Verbalizes understanding, Needs reinforcement    PRECAUTIONS/ALLERGIES: Patient has no known allergies.      Safety Devices in place: Yes  Type of devices: Left in chair, Call light within reach  Restraints Initially in Place: No    Therapy Time  SLP Individual Minutes  Time In: [address]  Time Out: [address]  Minutes: [address] [place] [address]  10/24/2022 12:56 PM

## 2022-10-24 NOTE — DISCHARGE INSTR - DIET
Good nutrition is important when healing from an illness, injury, or surgery. Follow any nutrition recommendations given to you during your hospital stay. If you were given an oral nutrition supplement while in the hospital, continue to take this supplement at home. You can take it with meals, in-between meals, and/or before bedtime. These supplements can be purchased at most local grocery stores, pharmacies, and chain [employer]-stores. If you have any questions about your diet or nutrition, call the hospital and ask for the dietitian. Nutrition Supplements  If you are not able to eat enough food or if you have extra calorie requirements, oral supplements can provide you with additional calories, protein, vitamins and minerals. Recommend Nepro or Glucerna (not chocolate) a comparable/similar product at least Once daily for 30 days unless otherwise directed by your Primary Care Physician.   Yari Fernandez, RD,LD, [id number]

## 2022-10-24 NOTE — PROGRESS NOTES
ACUTE PHYSICAL THERAPY GOALS:   (Developed with and agreed upon by patient and/or caregiver.)  DISCHARGE GOALS :  (1.)Mr. Hannah Centeno will move from supine to sit and sit to supine  with STAND BY ASSIST.   (2.)Mr. Hannah Centeno will transfer from bed to chair and chair to bed with STAND BY ASSIST using a walker. (3.)Mr. Hannah Centeno will ambulate with STAND BY ASSIST for 300 feet with a rolling walker. 4) pt able to go up & down 5 steps with rail & CGA, device PRN. _____________________________     PHYSICAL THERAPY Daily Note and AM  (Link to Caseload Tracking: PT Visit Days : 5  Acknowledge Orders  Time In/Out  PT Charge Capture  Rehab Caseload Tracker    Benita Andres is a 81 y.o. male   PRIMARY DIAGNOSIS: Sepsis with acute renal failure and tubular necrosis (HCC)  Generalized weakness [R53.1]  Closed nondisplaced fracture of anterior wall of right acetabulum, initial encounter ([facility] [address] [S32.414A]  Abdominal trauma, initial encounter [S39.91XA]  Inferior pubic ramus fracture, right, closed, initial encounter ([facility] [address] [S32.591A]  Acute renal failure, unspecified acute renal failure type ([address] [N17.9]       Reason for Referral: Generalized Muscle Weakness (M62.81)  Other lack of cordination (R27.8)  Difficulty in walking, Not elsewhere classified (R26.2)  History of falling (Z91.81)  Inpatient: Payor: Nancy Pierre / Plan: Android App Review Source [employer] / Product Type: *No Product type* /     ASSESSMENT:     REHAB RECOMMENDATIONS:   Recommendation to date pending progress:  [facility]  [facility]  If not approved by insurance, then STR is next recommendation. Since pt lives independently and still works, then [facility] would be an excellent option for intensive therapy    Equipment:    To Be Determined     ASSESSMENT:  Mr. Hannah Centeno presents in no distress, pleasant & cooperative, participated very well with therapy.  This pt is much more limited & functioning below his reported baseline since his right acetabulum & inferior pubic rami Fx due to a fall at home. This pt is completely unsafe to return home alone, but could benefit from aggressive in-pt post acute therapy at an inpt rehab facility on hospital DC. MSW/CM aware of DC needs. He is a little hard of hearing even though he wears hearing aides. 10/24: Pt sitting edge of bed with OT upon arrival.  Pt CGA/min A transfers with RW, CGA with ambulation with rolling walker, unsteady gait noted. Pt then performed exercise in chair and sit to stand transfer with rolling walker, verbal cueing for hand placement for safety. Continue to recommend inpatient (acute) rehab to return pt to prior level of function as pt independent at baseline and lives alone.      [facility] [address] [id number] AM-PAC 6 Clicks Basic Mobility Inpatient Short Form  AM-PAC Mobility Inpatient   How much difficulty turning over in bed?: A Little  How much difficulty sitting down on / standing up from a chair with arms?: A Little  How much difficulty moving from lying on back to sitting on side of bed?: A Little  How much help from another person moving to and from a bed to a chair?: A Little  How much help from another person needed to walk in hospital room?: A Little  How much help from another person for climbing 3-5 steps with a railing?: Total  AM-PAC Inpatient Mobility Raw Score : 16  AM-PAC Inpatient T-Scale Score : 40.78  Mobility Inpatient CMS 0-100% Score: 54.16  Mobility Inpatient CMS G-Code Modifier : CK    SUBJECTIVE:   Mr. 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    OBJECTIVE:     PAIN: VITALS / O2: PRECAUTION / Helen Scrape / DRAINS:   Pre Treatment:    Pt no complaints of pain      Post Treatment: Pt no complaints of pain Vitals        Oxygen       Aguilar Catheter    RESTRICTIONS/PRECAUTIONS:  Restrictions/Precautions: Fall Risk, Weight Bearing  Right Lower Extremity Weight Bearing: Weight Bearing As Tolerated  Left Lower Extremity Weight Bearing: Weight Bearing As Tolerated              GROSS EVALUATION: Intact Impaired (Comments):   AROM []  Decreased but functional   PROM []    Strength []  Decreased but functional   Balance []  Decreased but functional   Posture [] N/A   Sensation [x]  grossly   Coordination []   Decreased but functional   Tone []  Decreased but functional   Edema []    Activity Tolerance []  Fair-    []      COGNITION/  PERCEPTION: Intact Impaired (Comments):   Orientation [x]     Vision [x]     Hearing [x]     Cognition  [x]       MOBILITY: I Mod I S SBA CGA Min Mod Max Total  NT x2 Comments:   Bed Mobility    Rolling [] [] [] [] [] [] [] [] [] [] []    Supine to Sit [] [] [] [] [] [] [] [] [] [] []    Scooting [] [] [] [] [] [] [] [] [] [] []    Sit to Supine [] [] [] [] [] [] [] [] [] [] []    Transfers    Sit to Stand [] [] [] [] [x] [x] [] [] [] [] []    Bed to Chair [] [] [] [] [x] [] [] [] [] [] []    Stand to Sit [] [] [] [] [x] [] [] [] [] [] []     [] [] [] [] [] [] [] [] [] [] []    I=Independent, Mod I=Modified Independent, S=Supervision, SBA=Standby Assistance, CGA=Contact Guard Assistance,   Min=Minimal Assistance, Mod=Moderate Assistance, Max=Maximal Assistance, Total=Total Assistance, NT=Not Tested    GAIT: I Mod I S SBA CGA Min Mod Max Total  NT x2 Comments:   Level of Assistance [] [] [] [] [x] [] [] [] [] [] []    Distance 100     DME Rolling Walker    Gait Quality Antalgic, Decreased rosana , Decreased step clearance, Decreased step length, and Decreased stance Weightbearing Status      Stairs NT     I=Independent, Mod I=Modified Independent, S=Supervision, SBA=Standby Assistance, CGA=Contact Guard Assistance,   Min=Minimal Assistance, Mod=Moderate Assistance, Max=Maximal Assistance, Total=Total Assistance, NT=Not Tested    PLAN:   FREQUENCY AND DURATION: Daily for duration of hospital stay or until stated goals are met, whichever comes first.    THERAPY PROGNOSIS: Good    PROBLEM LIST:   (Skilled intervention is medically necessary to address:)  Decreased ADL/Functional Activities  Decreased Activity Tolerance  Decreased AROM/PROM  Decreased Balance  Decreased Coordination  Decreased Gait Ability  Decreased Safety Awareness  Decreased Strength  Decreased Transfer Abilities  Increased Pain INTERVENTIONS PLANNED:   (Benefits and precautions of physical therapy have been discussed with the patient.)  Self Care Training  Therapeutic Activity  Therapeutic Exercise/HEP  Gait Training  Education       TREATMENT:   EVALUATION:     TREATMENT:   Therapeutic Activity (30 Minutes): Therapeutic activity included Scooting, Transfer Training, Ambulation on level ground, Sitting balance , Standing balance, and LE exs, positioning to improve functional Activity tolerance, Balance, Coordination, Mobility, Strength, and ROM.     TREATMENT GRID:   Date:  10/20/22 Date:  10/21/22 Date:  10/22 Date:  10/24/22   Activity/Exercise Parameters Parameters Parameters    RECLINED: ankle DF/PF 10 15  15        Hip AB/AD 10 15  15        Heels Slides 10AA 15          SLR 10 15          Hip Flexion    15   SITTING: LAQ 10 15 2 x 10 15   STANDING: Sit to stand   2 x 10 with UE support 5   Heel raises with BUE support   2 x 10        AFTER TREATMENT PRECAUTIONS: Bed/Chair Locked, Call light within reach, Chair, Needs within reach, RN notified, and Visitors at bedside    INTERDISCIPLINARY COLLABORATION:  RN/ PCT and OT/ CELIS    EDUCATION:      TIME IN/OUT:  Time In: 0940  Time Out: 1010  Minutes: [address]

## 2022-10-24 NOTE — PROGRESS NOTES
2145: Pt unable to void in urinal. Bladder scan shows 354 mL in bladder. 2210: MD notified. Orders received for straight cath. 2245: Straight cath drained 250 mL of yellow/straw, cloudy urine in drainage bag. Pt tolerated well. MD advised to bladder scan again in 6 hours if pt is unable to void. 0445: Bladder scan shows >500 mL in bladder. Pt encouraged to void in urinal. This nurse spent 30 minutes with the pt walking around the room and standing with the pt in attempts to void in urinal. Pt voided approx 25 mL in urinal. Pt compliant, but expresses discouragement in being unable to void. 5000: MD notified. Orders received to place haines catheter. 0232: Haines catheter placed. 600 mL of yellow/straw colored urine obtained from drainage bag. Pt tolerated well.

## 2022-10-24 NOTE — PROGRESS NOTES
Hospitalist Progress Note   Admit Date:  10/18/2022 12:14 PM   Name:  Manish Recinos   Age:  81 y.o. Sex:  male  :  1935   MRN:  [id number]   Room:  [facility]/    Presenting Complaint: Fatigue     Reason(s) for Admission: Generalized weakness [R53.1]  Closed nondisplaced fracture of anterior wall of right acetabulum, initial encounter ([facility] [address] [S32.414A]  Abdominal trauma, initial encounter [S39.91XA]  Inferior pubic ramus fracture, right, closed, initial encounter ([facility] [address] [S32.591A]  Acute renal failure, unspecified acute renal failure type ([facility] [address] [N17.9]     Hospital Course:   81 y.o. male with medical history of  DM II, HTN, hyperkalemia secondary to bactrim use who presented with c/o generalized fatigue and increased urinary frequency. Pt reports a fall backwards down his brick stairs about 2 weeks ago and c/o abd pain since. Started having urinary incontinence last night. CT head neg for acute findings. CT c-spine neg for acute findings. CT chest/abd/pelvis with extensive findings of abdominal trauma (see full report below). Also found to have an right inferior pubic ramus fx and nondisplaced fx of the anterior wall of the right acetabulum. Ortho surgery (Dr. Sukumar Zapien) consulted per ER. General Surgery (Dr. Corbin Enriquez) contacted per ER. BC sent. Also with TIGIST with creatinine at 3.28 baseline around 1.3. Pt hemodynamically stable. Denies CP, SOB, n/v/d. Subjective & 24hr Events (10/24/22): Had cognitive testing performed today. Did not perform particularly well. Needs further evaluation to categorize form of dementia and timing. Awaiting placement in short-term rehab. Assessment & Plan:    Moderate dementia without behavioral disturbance, psychotic disturbance, mood disturbance, or anxiety  Assessment & Plan  Cognitive testing consistent with dementia, needs additional testing outpatient.  -Follow-up with primary care at discharge    * Sepsis with acute renal failure and tubular necrosis [facility])  Assessment & Plan  Resolved      Gout  Assessment & Plan  -hold allopurinol    Acute renal failure with acute tubular necrosis superimposed on stage 3a chronic kidney disease ([facility] [address]  Assessment & Plan  Responding appropriately to resuscitation.  -serial BMP  -IVF  -avoid nephrotoxic substances    Abnormal CT abd with RUQ inflammation  Assessment & Plan  Admission CT with \"stranding densities in the right upper quadrant adjacent to the gallbladder and first and second portion of the duodenum and in Morison's pouch. This tracks inferiorly. \"  -consult general surgery: concern for duodenitis  10/24/2022: continue conservative treatment    Chronic kidney disease, stage 3a ([address]  Assessment & Plan  Noted  -avoid nephrotoxic substances    Skin tear of elbow without complication  Assessment & Plan  -wound consult    Diabetes mellitus type 2, controlled ([facility] [address]  Assessment & Plan  Admission A1c 7.5  -hold sitagliptin  -metformin  -sliding scale insulin    3.4cm left adrenal mass noted incidentally on CT 10/18/22  Assessment & Plan  -outpatient follow up with urology    Hyperlipidemia  Assessment & Plan  -atorvastatin    Urinary retention  Assessment & Plan  -haines  -tamsulosin  10/24/2022: voiding trial failed, haines replaced, OP FU with urology    Generalized weakness  Assessment & Plan  -PT, OT, PPD  10/24/2022: needs short term rehab    [address]  -fall precuations    Closed nondisplaced fracture of anterior wall of right acetabulum [facility])  Assessment & Plan  -consult orthopedic surgery: conservative managment    Primary hypertension  Assessment & Plan  -amlodipine, furosemide          Anticipated discharge needs:      Short term rehab    Diet:  ADULT DIET; Regular; 4 carb choices (60 gm/meal);  Low Potassium (Less than 3000 mg/day)  ADULT ORAL NUTRITION SUPPLEMENT; Lunch; Diabetic Oral Supplement  ADULT ORAL NUTRITION SUPPLEMENT; Lunch; Renal Oral Supplement  DVT PPx: ambulatory  Code status: Full Code    Hospital Problems:  Principal Problem:    Sepsis with acute renal failure and tubular necrosis (HCC)  Active Problems:    Abnormal CT abd with RUQ inflammation    Closed nondisplaced fracture of anterior wall of right acetabulum (HCC)    Inferior pubic ramus fracture, right, closed, initial encounter ([facility] [address]    Acute renal failure with acute tubular necrosis superimposed on stage 3a chronic kidney disease (HCC)    Fall    Contusion of abdominal wall    Generalized weakness    Closed nondisplaced fracture of anterior wall of right acetabulum with delayed healing    Urinary retention    Gout    3.4cm left adrenal mass noted incidentally on CT 10/18/22    Moderate protein-calorie malnutrition (HCC)    Diabetes mellitus type 2, controlled ([facility])    Skin tear of elbow without complication    Chronic kidney disease, stage 3a ([facility] [address]    Normocytic anemia    Primary hypertension    Hyperlipidemia  Resolved Problems:    * No resolved hospital problems.  *      Objective:   Patient Vitals for the past 24 hrs:   Temp Pulse Resp BP SpO2   10/24/22 1500 98.3 °F (36.8 °C) 96 14 107/61 96 %   10/24/22 1045 97.8 °F (36.6 °C) 99 16 (!) 113/57 97 %   10/24/22 0700 (!) 94.7 °F (34.8 °C) 88 16 (!) 124/57 96 %   10/24/22 0356 97.8 °F (36.6 °C) 88 17 122/60 96 %   10/23/22 2245 98.5 °F (36.9 °C) 100 19 (!) 119/49 --   10/23/22 1932 98.1 °F (36.7 °C) (!) 102 18 (!) 110/59 96 %       Oxygen Therapy  SpO2: 96 %  Pulse Oximetry Type: Continuous  Pulse via Oximetry: 88 beats per minute  SPO2 High Alarm Limit: 100  SPO2 Low Alarm Limit POX: 90  Pulse Oximeter Device Mode: Continuous  Pulse Oximeter Device Location: Right, Hand, Finger  O2 Device: None (Room air)  Oximetry Probe Site Changed: No  Skin Assessment: Clean, dry, & intact  Skin Protection for O2 Device: N/A  O2 Flow Rate (L/min): 0 L/min  Oxygen Therapy: Supplemental oxygen  O2 Delivery Method: Nasal cannula    Estimated body mass index is 18.73 kg/m² as calculated from the following:    Height as of this encounter: 6' 1\" (1.854 m). Weight as of this encounter: 142 lb (64.4 kg). Intake/Output Summary (Last 24 hours) at 10/24/2022 1616  Last data filed at 10/23/2022 2245  Gross per 24 hour   Intake --   Output 250 ml   Net -250 ml       Blood pressure 107/61, pulse 96, temperature 98.3 °F (36.8 °C), temperature source Oral, resp. rate 14, height 6' 1\" (1.854 m), weight 142 lb (64.4 kg), SpO2 96 %. Physical Exam  Vitals and nursing note reviewed. Constitutional:       General: He is not in acute distress. Appearance: He is underweight. He is not diaphoretic. HENT:      Head: Normocephalic and atraumatic. Eyes:      Extraocular Movements: Extraocular movements intact. Cardiovascular:      Rate and Rhythm: Normal rate. Pulmonary:      Effort: Pulmonary effort is normal. No respiratory distress. Abdominal:      General: There is no distension. Musculoskeletal:         General: No deformity. Skin:     Coloration: Skin is not jaundiced or pale. Findings: Bruising present. Neurological:      General: No focal deficit present. Mental Status: He is alert and oriented to person, place, and time. Motor: Weakness present. Psychiatric:         Mood and Affect: Mood normal.         Behavior: Behavior normal. Behavior is cooperative. Cognition and Memory: Cognition is impaired. Memory is impaired.          Judgment: Judgment normal.         I have personally reviewed labs and tests showing:  Recent Labs:  Recent Results (from the past 48 hour(s))   POCT Glucose    Collection Time: 10/22/22  4:46 PM   Result Value Ref Range    POC Glucose 326 (H) 65 - 100 mg/dL    Performed by: Eda    POCT Glucose    Collection Time: 10/22/22  9:07 PM   Result Value Ref Range    POC Glucose 190 (H) 65 - 100 mg/dL    Performed by: Yamilet Aguirre    CBC with Auto Differential    Collection Time: 10/23/22  3:44 AM   Result Value Ref Range    WBC 7.1 4.3 - 11.1 K/uL    RBC 3.10 (L) 4.23 - 5.6 M/uL    Hemoglobin 9.4 (L) 13.6 - 17.2 g/dL    Hematocrit 29.6 (L) 41.1 - 50.3 %    MCV 95.5 82.0 - 102.0 FL    MCH 30.3 26.1 - 32.9 PG    MCHC 31.8 31.4 - 35.0 g/dL    RDW 15.3 (H) 11.9 - 14.6 %    Platelets 388 240 - 777 K/uL    MPV 10.8 9.4 - 12.3 FL    nRBC 0.00 0.0 - 0.2 K/uL    Seg Neutrophils 67 47 - 75 %    Lymphocytes 14 (L) 16 - 44 %    Monocytes 8 3 - 9 %    Eosinophils % 2 1 - 8 %    Metamyelocytes 1 %    Myelocytes 7 %    Promyelocytes 1 %    Segs Absolute 5.4 1.7 - 8.2 K/UL    Absolute Lymph # 1.0 0.5 - 4.6 K/UL    Absolute Mono # 0.6 0.1 - 1.3 K/UL    Absolute Eos # 0.1 0.0 - 0.8 K/UL    RBC Comment OCCASIONAL  ANISOCYTOSIS        RBC Comment OCCASIONAL  POLYCHROMASIA        Platelet Comment ADEQUATE      Differential Type MANUAL     Basic Metabolic Panel    Collection Time: 10/23/22  3:44 AM   Result Value Ref Range    Sodium 138 133 - 143 mmol/L    Potassium 4.7 3.5 - 5.1 mmol/L    Chloride 106 101 - 110 mmol/L    CO2 28 21 - 32 mmol/L    Anion Gap 4 2 - 11 mmol/L    Glucose 247 (H) 65 - 100 mg/dL    BUN 28 (H) 8 - 23 MG/DL    Creatinine 1.06 0.8 - 1.5 MG/DL    Est, Glom Filt Rate >60 >60 ml/min/1.73m2    Calcium 8.9 8.3 - 10.4 MG/DL   POCT Glucose    Collection Time: 10/23/22  6:33 AM   Result Value Ref Range    POC Glucose 205 (H) 65 - 100 mg/dL    Performed by: Blayne Model    POCT Glucose    Collection Time: 10/23/22 11:25 AM   Result Value Ref Range    POC Glucose 210 (H) 65 - 100 mg/dL    Performed by: [facility]    POCT Glucose    Collection Time: 10/23/22  4:08 PM   Result Value Ref Range    POC Glucose 226 (H) 65 - 100 mg/dL    Performed by: [facility]    POCT Glucose    Collection Time: 10/23/22  8:39 PM   Result Value Ref Range    POC Glucose 278 (H) 65 - 100 mg/dL    Performed by: Ron    CBC with Auto Differential    Collection Time: 10/24/22  3:48 AM   Result Value Ref Range    WBC 7.6 4.3 - 11.1 K/uL    RBC 3.10 (L) 4.23 - 5.6 M/uL    Hemoglobin 9.4 (L) 13.6 - 17.2 g/dL    Hematocrit 29.6 (L) 41.1 - 50.3 %    MCV 95.5 82.0 - 102.0 FL    MCH 30.3 26.1 - 32.9 PG    MCHC 31.8 31.4 - 35.0 g/dL    RDW 15.1 (H) 11.9 - 14.6 %    Platelets 496 293 - 562 K/uL    MPV 10.1 9.4 - 12.3 FL    nRBC 0.00 0.0 - 0.2 K/uL    Seg Neutrophils 60 47 - 75 %    Bands 7 (H) 0 - 6 %    Lymphocytes 11 (L) 16 - 44 %    Monocytes 7 3 - 9 %    Eosinophils % 9 (H) 1 - 8 %    Basophils 2 0 - 2 %    Metamyelocytes 1 %    Myelocytes 3 %    Segs Absolute 5.2 1.7 - 8.2 K/UL    Absolute Lymph # 0.8 0.5 - 4.6 K/UL    Absolute Mono # 0.5 0.1 - 1.3 K/UL    Absolute Eos # 0.7 0.0 - 0.8 K/UL    Basophils Absolute 0.2 0.0 - 0.2 K/UL    RBC Comment SLIGHT  ANISOCYTOSIS        RBC Comment SLIGHT  POLYCHROMASIA        RBC Comment SLIGHT  MACROCYTOSIS        WBC Comment SLIGHT      Platelet Comment ADEQUATE      Differential Type MANUAL     Basic Metabolic Panel    Collection Time: 10/24/22  3:48 AM   Result Value Ref Range    Sodium 138 133 - 143 mmol/L    Potassium 4.6 3.5 - 5.1 mmol/L    Chloride 103 101 - 110 mmol/L    CO2 28 21 - 32 mmol/L    Anion Gap 7 2 - 11 mmol/L    Glucose 189 (H) 65 - 100 mg/dL    BUN 37 (H) 8 - 23 MG/DL    Creatinine 1.18 0.8 - 1.5 MG/DL    Est, Glom Filt Rate >60 >60 ml/min/1.73m2    Calcium 8.7 8.3 - 10.4 MG/DL   POCT Glucose    Collection Time: 10/24/22  6:30 AM   Result Value Ref Range    POC Glucose 206 (H) 65 - 100 mg/dL    Performed by: Ron    POCT Glucose    Collection Time: 10/24/22 11:10 AM   Result Value Ref Range    POC Glucose 269 (H) 65 - 100 mg/dL    Performed by: Jena Duckworth        I have personally reviewed imaging studies showing: Other Studies:  CT Head W/O Contrast   Final Result   Negative for acute intracranial abnormality. Chronic changes. CT CSpine W/O Contrast   Final Result   1) Negative for acute cervical spine fracture.    2) Facet arthropathy and spondylosis and carotid atherosclerosis. 3) Chronic bronchiectasis and peribronchial thickening and chronic inflammatory   change right lung apex. CT CHEST ABDOMEN PELVIS WO CONTRAST Additional Contrast? None   Final Result   Exam limited due to lack of intravenous contrast.    1) Nondisplaced fracture anterior lip right acetabulum and fracture inferior   pubic ramus on the right with small amount of callus formation. 2) Stranding densities in the right upper quadrant, near the neck the   gallbladder, first and second portion of the duodenum and in Morison's pouch. This could be acute inflammation or posttraumatic hemorrhage. 3) Stranding densities tract along the right abdomen down into the spermatic   cord and upper scrotum on the right, inflammatory versus posttraumatic. 4) A 3.4 cm left adrenal mass, neoplasia versus adrenal hemorrhage. 5) Urinary bladder is quite distended. XR ELBOW RIGHT (MIN 3 VIEWS)   Final Result   Negative for acute fracture. Osteoarthritis.              Current Meds:  Current Facility-Administered Medications   Medication Dose Route Frequency    furosemide (LASIX) tablet 20 mg  20 mg Oral Daily    metFORMIN (GLUCOPHAGE) tablet 500 mg  500 mg Oral BID WC    amLODIPine (NORVASC) tablet 5 mg  5 mg Oral Daily    aspirin EC tablet 81 mg  81 mg Oral Daily    atorvastatin (LIPITOR) tablet 20 mg  20 mg Oral Nightly    vitamin B-12 (CYANOCOBALAMIN) tablet 500 mcg  500 mcg Oral Daily    insulin lispro (HUMALOG) injection vial 0-8 Units  0-8 Units SubCUTAneous TID WC    insulin lispro (HUMALOG) injection vial 0-4 Units  0-4 Units SubCUTAneous Nightly    glucose chewable tablet 16 g  4 tablet Oral PRN    dextrose bolus 10% 125 mL  125 mL IntraVENous PRN    Or    dextrose bolus 10% 250 mL  250 mL IntraVENous PRN    glucagon (rDNA) injection 1 mg  1 mg SubCUTAneous PRN    dextrose 10 % infusion   IntraVENous Continuous PRN    pantoprazole (PROTONIX) tablet 40 mg  40 mg Oral QAM AC sennosides-docusate sodium (SENOKOT-S) 8.6-50 MG tablet 2 tablet  2 tablet Oral Daily PRN    tamsulosin (FLOMAX) capsule 0.4 mg  0.4 mg Oral Daily    allopurinol (ZYLOPRIM) tablet 50 mg  50 mg Oral Daily    sodium zirconium cyclosilicate (LOKELMA) oral suspension 5 g  5 g Oral Every Other Day    potassium chloride (KLOR-CON M) extended release tablet 40 mEq  40 mEq Oral PRN    Or    potassium bicarb-citric acid (EFFER-K) effervescent tablet 40 mEq  40 mEq Oral PRN    Or    potassium chloride 10 mEq/100 mL IVPB (Peripheral Line)  10 mEq IntraVENous PRN    magnesium sulfate 2000 mg in 50 mL IVPB premix  2,000 mg IntraVENous PRN    sodium phosphate 10 mmol in sodium chloride 0.9 % 250 mL IVPB  10 mmol IntraVENous PRN    Or    sodium phosphate 15 mmol in sodium chloride 0.9 % 250 mL IVPB  15 mmol IntraVENous PRN    Or    sodium phosphate 20 mmol in sodium chloride 0.9 % 500 mL IVPB  20 mmol IntraVENous PRN    sodium chloride flush 0.9 % injection 5-40 mL  5-40 mL IntraVENous 2 times per day    sodium chloride flush 0.9 % injection 5-40 mL  5-40 mL IntraVENous PRN    0.9 % sodium chloride infusion   IntraVENous PRN    ondansetron (ZOFRAN-ODT) disintegrating tablet 4 mg  4 mg Oral Q8H PRN    Or    ondansetron (ZOFRAN) injection 4 mg  4 mg IntraVENous Q6H PRN    acetaminophen (TYLENOL) tablet 650 mg  650 mg Oral Q6H PRN    Or    acetaminophen (TYLENOL) suppository 650 mg  650 mg Rectal Q6H PRN       Signed:  Evelin Coyne MD

## 2022-10-24 NOTE — ASSESSMENT & PLAN NOTE
Cognitive testing consistent with dementia, needs additional testing outpatient.  -Follow-up with primary care at discharge

## 2022-10-24 NOTE — CARE COORDINATION
Medical record reviewed. Acute rehab at [facility] was denied by [facility]. Discharge plan is now for short term rehab in a skilled facility. Multiple offers noted. Offers presented to patient. Bed accepted at [address] Insurance [employer] is pending. CM will continue to follow to assist with planning. CM received message to contact pts sister to provide an update. CM spoke with sister. She is aware of and in agreement with plans.

## 2022-10-24 NOTE — PROGRESS NOTES
ACUTE OCCUPATIONAL THERAPY GOALS:   (Developed with and agreed upon by patient and/or caregiver.)  1. Patient will perform grooming with supervision. PROGRESSING 10/24/22   2. Patient will perform upper body dressing with supervision. 3. Patient will perform lower body dressing with min assist. PROGRESSING 10/24/22   4. Patient will perform bathing with min assist.  5. Patient will perform toileting and toilet transfer with min assist.  6. Patient will perform ADL functional mobility and tranfers in room with CGA  PROGRESSING 10/24/22   7. Patient/family to demonstrate knowledge of home safety and DME recommendations. Goals to be achieved in 7 days. OCCUPATIONAL THERAPY Daily Note and AM       OT Visit Days: 2  Acknowledge Orders  Time  OT Charge Capture  Rehab Caseload Tracker      Mary Garcia is a 81 y.o. male   PRIMARY DIAGNOSIS: Sepsis with acute renal failure and tubular necrosis (HCC)  Generalized weakness [R53.1]  Closed nondisplaced fracture of anterior wall of right acetabulum, initial encounter ([facility] [address] [S32.414A]  Abdominal trauma, initial encounter [S39.91XA]  Inferior pubic ramus fracture, right, closed, initial encounter ([facility] [address] [S32.591A]  Acute renal failure, unspecified acute renal failure type ([address] [N17.9]       Reason for Referral: Pain in Right Hip (M25.551)  Stiffness of Right Hip, Not elsewhere classified (M25.651)  Generalized Muscle Weakness (M62.81)  Other lack of cordination (R27.8)  Difficulty in walking, Not elsewhere classified (R26.2)  Other abnormalities of gait and mobility (R26.89)  History of falling (Z91.81)  Inpatient: Payor: Elizabeth Stewart / Plan: [employer] G[employer] PLUS HMO / Product Type: *No Product type* /     ASSESSMENT:     REHAB RECOMMENDATIONS:   Recommendation to date pending progress:  Setting:  Inpatient Rehab Facility    Equipment:    To Be Determined     ASSESSMENT:  Mr. Bud Jacobson is in ICU sitting up in recliner with his daughter julianne.  Patient had a fall a few weeks ago. Per nursing WBAT with Sandra Curtis. He has a Closed nondisplaced fracture of anterior wall of right acetabulum,   Inferior pubic ramus fracture, right, closed. 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 He is min assist sit to stand and min for mobility with RW. He has a catheter and he is min for UE and LE ADLs. He would benefit from skilled OT and STR. Will follow. 10/24/22: Mr Declan Stevenson presents supine in bed and agreeable to OT session. He required min A for supine to sit and was able to scoot to EOB and maintain balance with SBA. He required min-mod A for LB dressing at EOB and min A for sit<>stand. He mobilized around room with RW and CGA. Pt performed grooming tasks at sink and required a seated rest break. He mobilized further household distances with RW, CGA and verbal cues and then was positioned comfortably in the chair. He remains motivated to return to his independent baseline level of function and has good family support. He would benefit from skilled OT in an inpatient rehab setting in order to continue to work towards returning to his independent baseline.      [address] [id number] AM-PAC 6 Clicks Daily Activity Inpatient Short Form:          SUBJECTIVE:     Mr. 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    OBJECTIVE:     Tj Slater / Love Gillis / Nadine Lara: Aguilar Catheter    RESTRICTIONS/PRECAUTIONS:  Restrictions/Precautions: Fall Risk, Weight Bearing  Right Lower Extremity Weight Bearing: Weight Bearing As Tolerated  Left Lower Extremity Weight Bearing: Weight Bearing As Tolerated with Walker    PAIN: VITALS / O2:   Pre Treatment:        Post Treatment: no c/o pain during session       Vitals          Oxygen            MOBILITY: I Mod I S SBA CGA Min Mod Max Total  NT x2 Comments:   Bed Mobility    Rolling [] [] [] [] [] [] [] [] [] [] []    Supine to Sit [] [] [] [] [] [x] [] [] [] [] []    Scooting [] [] [] [] [x] [] [] [] [] [] []    Sit to Supine [] [] [] [] [] [] [] [] [] [] []    Transfers    Sit to Stand [] [] [] [] [] [x] [] [] [] [] []    Bed to Chair [] [] [] [] [x] [] [] [] [] [] []    Stand to Sit [] [] [] [] [] [x] [] [] [] [] []    Tub/Shower [] [] [] [] [] [] [] [] [] [] []     Toilet [] [] [] [] [] [] [] [] [] [] []      [] [] [] [] [] [] [] [] [] [] []    I=Independent, Mod I=Modified Independent, S=Supervision/Setup, SBA=Standby Assistance, CGA=Contact Guard Assistance, Min=Minimal Assistance, Mod=Moderate Assistance, Max=Maximal Assistance, Total=Total Assistance, NT=Not Tested    ACTIVITIES OF DAILY LIVING: I Mod I S SBA CGA Min Mod Max Total NT Comments   BASIC ADLs:              Upper Body Bathing  [] [] [] [] [] [] [] [] [] []    Lower Body Bathing [] [] [] [] [] [] [] [] [] []    Toileting [] [] [] [] [] [] [] [] [x] [] Catheter   Upper Body Dressing [] [] [] [x] [] [] [] [] [] []    Lower Body Dressing [] [] [] [] [] [x] [] [] [] [] Shoes at EOB   Feeding [] [] [] [] [] [] [] [] [] []    Grooming [] [] [] [x] [x] [] [] [] [] [] Sitting and standing at sink for oral and face hygiene   Personal Device Care [] [] [] [] [] [] [] [] [] []    Functional Mobility [] [] [] [] [x] [] [] [] [] [] RW and a few verbal cues   I=Independent, Mod I=Modified Independent, S=Supervision/Setup, SBA=Standby Assistance, CGA=Contact Guard Assistance, Min=Minimal Assistance, Mod=Moderate Assistance, Max=Maximal Assistance, Total=Total Assistance, NT=Not Tested    PLAN:   FREQUENCY/DURATION   OT Plan of Care: 3 times/week for duration of hospital stay or until stated goals are met, whichever comes first.    TREATMENT:     TREATMENT:   Co-Treatment PT/OT necessary due to patient's decreased overall endurance/tolerance levels, as well as need for high level skilled assistance to complete functional transfers/mobility and functional tasks  Self Care (30 minutes): Patient participated in lower body dressing, personal device care, and grooming ADLs in supported sitting, unsupported sitting, and standing with minimal verbal, manual, and tactile cueing to increase independence, increase activity tolerance, and increase safety awareness. Patient also participated in functional mobility, functional transfer, energy conservation, and adaptive equipment training to increase independence, increase activity tolerance, and increase safety awareness. AFTER TREATMENT PRECAUTIONS: Bed/Chair Locked, Call light within reach, Chair, Needs within reach, RN notified, and Visitors at bedside    INTERDISCIPLINARY COLLABORATION:  RN/ PCT and PT/ PTA    EDUCATION:  Education Given To: Patient  Education Provided: Role of Therapy;Plan of Care;ADL Adaptive Strategies;Transfer Training;Energy Conservation;Equipment; Fall Prevention Strategies  Education Method: Demonstration;Verbal  Barriers to Learning: None  Education Outcome: Verbalized understanding;Demonstrated understanding;Continued education needed    TOTAL TREATMENT DURATION AND TIME:  Time In: 0930  Time Out: 1000  Minutes: [place], [place]

## 2022-10-25 VITALS
SYSTOLIC BLOOD PRESSURE: 104 MMHG | HEART RATE: 100 BPM | RESPIRATION RATE: 16 BRPM | HEIGHT: 73 IN | OXYGEN SATURATION: 95 % | WEIGHT: 142 LBS | TEMPERATURE: 97.5 F | BODY MASS INDEX: 18.82 KG/M2 | DIASTOLIC BLOOD PRESSURE: 60 MMHG

## 2022-10-25 PROBLEM — R65.20 SEPSIS WITH ACUTE RENAL FAILURE AND TUBULAR NECROSIS (HCC): Status: RESOLVED | Noted: 2022-10-19 | Resolved: 2022-10-25

## 2022-10-25 PROBLEM — N17.0 SEPSIS WITH ACUTE RENAL FAILURE AND TUBULAR NECROSIS (HCC): Status: RESOLVED | Noted: 2022-10-19 | Resolved: 2022-10-25

## 2022-10-25 PROBLEM — N17.0 ACUTE RENAL FAILURE WITH ACUTE TUBULAR NECROSIS SUPERIMPOSED ON STAGE 3A CHRONIC KIDNEY DISEASE (HCC): Status: RESOLVED | Noted: 2022-10-18 | Resolved: 2022-10-25

## 2022-10-25 PROBLEM — S30.1XXA CONTUSION OF ABDOMINAL WALL: Status: RESOLVED | Noted: 2022-10-18 | Resolved: 2022-10-25

## 2022-10-25 PROBLEM — A41.9 SEPSIS WITH ACUTE RENAL FAILURE AND TUBULAR NECROSIS (HCC): Status: RESOLVED | Noted: 2022-10-19 | Resolved: 2022-10-25
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
## 2022-10-25 PROBLEM — N18.31 ACUTE RENAL FAILURE WITH ACUTE TUBULAR NECROSIS SUPERIMPOSED ON STAGE 3A CHRONIC KIDNEY DISEASE (HCC): Status: RESOLVED | Noted: 2022-10-18 | Resolved: 2022-10-25

## 2022-10-25 PROBLEM — R93.5 ABNORMAL CT OF THE ABDOMEN: Status: RESOLVED | Noted: 2022-10-18 | Resolved: 2022-10-25

## 2022-10-25 LAB
BASOPHILS # BLD: 0.1 K/UL (ref 0–0.2)
BASOPHILS NFR BLD MANUAL: 1 % (ref 0–2)
DIFFERENTIAL METHOD BLD: ABNORMAL
EOSINOPHIL # BLD: 0.5 K/UL (ref 0–0.8)
EOSINOPHIL NFR BLD MANUAL: 5 % (ref 1–8)
ERYTHROCYTE [DISTWIDTH] IN BLOOD BY AUTOMATED COUNT: 15.3 % (ref 11.9–14.6)
GLUCOSE BLD STRIP.AUTO-MCNC: 196 MG/DL (ref 65–100)
GLUCOSE BLD STRIP.AUTO-MCNC: 266 MG/DL (ref 65–100)
HCT VFR BLD AUTO: 29 % (ref 41.1–50.3)
HGB BLD-MCNC: 8.9 G/DL (ref 13.6–17.2)
LYMPHOCYTES # BLD: 1.2 K/UL (ref 0.5–4.6)
LYMPHOCYTES NFR BLD MANUAL: 13 % (ref 16–44)
MCH RBC QN AUTO: 29.3 PG (ref 26.1–32.9)
MCHC RBC AUTO-ENTMCNC: 30.7 G/DL (ref 31.4–35)
MCV RBC AUTO: 95.4 FL (ref 82–102)
METAMYELOCYTES NFR BLD MANUAL: 1 %
MONOCYTES # BLD: 1.1 K/UL (ref 0.1–1.3)
MONOCYTES NFR BLD MANUAL: 12 % (ref 3–9)
MYELOCYTES NFR BLD MANUAL: 4 %
NEUTS BAND NFR BLD MANUAL: 6 % (ref 0–6)
NEUTS SEG # BLD: 5.9 K/UL (ref 1.7–8.2)
NEUTS SEG NFR BLD MANUAL: 58 % (ref 47–75)
NRBC # BLD: 0 K/UL (ref 0–0.2)
PLATELET # BLD AUTO: 225 K/UL (ref 150–450)
PLATELET COMMENT: ADEQUATE
PMV BLD AUTO: 10.6 FL (ref 9.4–12.3)
RBC # BLD AUTO: 3.04 M/UL (ref 4.23–5.6)
RBC MORPH BLD: ABNORMAL
SARS-COV-2 RDRP RESP QL NAA+PROBE: NOT DETECTED
SERVICE CMNT-IMP: ABNORMAL
SERVICE CMNT-IMP: ABNORMAL
SOURCE: NORMAL
WBC # BLD AUTO: 9 K/UL (ref 4.3–11.1)
WBC MORPH BLD: SLIGHT

## 2022-10-25 PROCEDURE — 97530 THERAPEUTIC ACTIVITIES: CPT

## 2022-10-25 PROCEDURE — 87635 SARS-COV-2 COVID-19 AMP PRB: CPT

## 2022-10-25 PROCEDURE — 6370000000 HC RX 637 (ALT 250 FOR IP): Performed by: INTERNAL MEDICINE

## 2022-10-25 PROCEDURE — 97110 THERAPEUTIC EXERCISES: CPT

## 2022-10-25 PROCEDURE — 85025 COMPLETE CBC W/AUTO DIFF WBC: CPT

## 2022-10-25 PROCEDURE — 36415 COLL VENOUS BLD VENIPUNCTURE: CPT

## 2022-10-25 PROCEDURE — 82962 GLUCOSE BLOOD TEST: CPT

## 2022-10-25 RX ORDER — TAMSULOSIN HYDROCHLORIDE 0.4 MG/1
0.4 CAPSULE ORAL DAILY
Qty: 30 CAPSULE | Refills: 0 | Status: SHIPPED | OUTPATIENT
Start: 2022-10-26

## 2022-10-25 RX ORDER — FUROSEMIDE 20 MG/1
20 TABLET ORAL DAILY
Qty: 30 TABLET | Refills: 0 | Status: SHIPPED | OUTPATIENT
Start: 2022-10-25 | End: 2022-11-24

## 2022-10-25 RX ADMIN — FUROSEMIDE 20 MG: 20 TABLET ORAL at 08:34

## 2022-10-25 RX ADMIN — AMLODIPINE BESYLATE 5 MG: 5 TABLET ORAL at 08:33

## 2022-10-25 RX ADMIN — INSULIN LISPRO 4 UNITS: 100 INJECTION, SOLUTION INTRAVENOUS; SUBCUTANEOUS at 11:33

## 2022-10-25 RX ADMIN — PANTOPRAZOLE SODIUM 40 MG: 40 TABLET, DELAYED RELEASE ORAL at 05:16

## 2022-10-25 RX ADMIN — ALLOPURINOL 50 MG: 100 TABLET ORAL at 08:33

## 2022-10-25 RX ADMIN — ASPIRIN 81 MG: 81 TABLET, COATED ORAL at 08:32

## 2022-10-25 RX ADMIN — CYANOCOBALAMIN TAB 1000 MCG 500 MCG: 1000 TAB at 08:32

## 2022-10-25 RX ADMIN — TAMSULOSIN HYDROCHLORIDE 0.4 MG: 0.4 CAPSULE ORAL at 08:33

## 2022-10-25 RX ADMIN — METFORMIN HYDROCHLORIDE 500 MG: 500 TABLET ORAL at 08:33

## 2022-10-25 NOTE — PROGRESS NOTES
ACUTE PHYSICAL THERAPY GOALS:   (Developed with and agreed upon by patient and/or caregiver.)  DISCHARGE GOALS :  (1.)Mr. Edgard Delarosa will move from supine to sit and sit to supine  with STAND BY ASSIST.   (2.)Mr. Edgard Delarosa will transfer from bed to chair and chair to bed with STAND BY ASSIST using a walker. (3.)Mr. Edgard Delarosa will ambulate with STAND BY ASSIST for 300 feet with a rolling walker. 4) pt able to go up & down 5 steps with rail & CGA, device PRN. _____________________________     PHYSICAL THERAPY Daily Note and AM  (Link to Caseload Tracking: PT Visit Days : 6  Acknowledge Orders  Time In/Out  PT Charge Capture  Rehab Caseload Tracker    Crow Barrios is a 81 y.o. male   PRIMARY DIAGNOSIS: Sepsis with acute renal failure and tubular necrosis (HCC)  Generalized weakness [R53.1]  Closed nondisplaced fracture of anterior wall of right acetabulum, initial encounter ([facility] [address] [S32.414A]  Abdominal trauma, initial encounter [S39.91XA]  Inferior pubic ramus fracture, right, closed, initial encounter ([facility] [address] [S32.591A]  Acute renal failure, unspecified acute renal failure type ([address] [N17.9]       Reason for Referral: Generalized Muscle Weakness (M62.81)  Other lack of cordination (R27.8)  Difficulty in walking, Not elsewhere classified (R26.2)  History of falling (Z91.81)  Inpatient: Payor: [address] [place] [address] / Plan: [employer] G[employer]US HMO / Product Type: *No Product type* /     ASSESSMENT:     REHAB RECOMMENDATIONS:   Recommendation to date pending progress:  Setting:  Inpatient [facility]  If not approved by insurance, then STR is next recommendation. Since pt lives independently and still works, then [facility] would be an excellent option for intensive therapy    Equipment:    To Be Determined     ASSESSMENT:  Mr. Edgard Delarosa presents in no distress, pleasant & cooperative, participated very well with therapy.  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    OBJECTIVE:     PAIN: Jolie Salen / O2: PRECAUTION / Lacy Broody / DRAINS:   Pre Treatment:    Pt no complaints of pain      Post Treatment: Pt no complaints of pain Vitals        Oxygen       Aguilar Catheter    RESTRICTIONS/PRECAUTIONS:  Restrictions/Precautions: Fall Risk, Weight Bearing  Right Lower Extremity Weight Bearing: Weight Bearing As Tolerated  Left Lower Extremity Weight Bearing: Weight Bearing As Tolerated              GROSS EVALUATION: Intact Impaired (Comments):   AROM []  Decreased but functional   PROM []    Strength []  Decreased but functional   Balance []  Decreased but functional   Posture [] N/A   Sensation [x]  grossly   Coordination []   Decreased but functional   Tone []  Decreased but functional   Edema []    Activity Tolerance []  Fair-    []      COGNITION/  PERCEPTION: Intact Impaired (Comments):   Orientation [x]     Vision [x]     Hearing [x]     Cognition  [x]       MOBILITY: I Mod I S SBA CGA Min Mod Max Total  NT x2 Comments:   Bed Mobility    Rolling [] [] [] [] [] [] [] [] [] [] []    Supine to Sit [] [] [] [] [] [] [] [] [] [] []    Scooting [] [] [] [] [] [] [] [] [] [] []    Sit to Supine [] [] [] [] [] [] [] [] [] [] []    Transfers    Sit to Stand [] [] [] [] [x] [x] [] [] [] [] []    Bed to Chair [] [] [] [] [x] [] [] [] [] [] []    Stand to Sit [] [] [] [] [x] [] [] [] [] [] []     [] [] [] [] [] [] [] [] [] [] []    I=Independent, Mod I=Modified Independent, S=Supervision, SBA=Standby Assistance, CGA=Contact Guard Assistance,   Min=Minimal Assistance, Mod=Moderate Assistance, Max=Maximal Assistance, Total=Total Assistance, NT=Not Tested    GAIT: I Mod I S SBA CGA Min Mod Max Total  NT x2 Comments:   Level of Assistance [] [] [] [] [x] [] [] [] [] [] []    Distance       DME Rolling Walker    Gait Quality Antalgic, Decreased rosana , Decreased step clearance, Decreased step length, and Decreased stance    Weightbearing Status      Stairs NT     I=Independent, Mod I=Modified Independent, S=Supervision, SBA=Standby Assistance, CGA=Contact Guard Assistance,   Min=Minimal Assistance, Mod=Moderate Assistance, Max=Maximal Assistance, Total=Total Assistance, NT=Not Tested    PLAN:   FREQUENCY AND DURATION: Daily for duration of hospital stay or until stated goals are met, whichever comes first.    THERAPY PROGNOSIS: Good    PROBLEM LIST:   (Skilled intervention is medically necessary to address:)  Decreased ADL/Functional Activities  Decreased Activity Tolerance  Decreased AROM/PROM  Decreased Balance  Decreased Coordination  Decreased Gait Ability  Decreased Safety Awareness  Decreased Strength  Decreased Transfer Abilities  Increased Pain INTERVENTIONS PLANNED:   (Benefits and precautions of physical therapy have been discussed with the patient.)  Self Care Training  Therapeutic Activity  Therapeutic Exercise/HEP  Gait Training  Education       TREATMENT:   EVALUATION:     TREATMENT:   Therapeutic Activity (15 Minutes): Therapeutic activity included Scooting, Transfer Training, Ambulation on level ground, Sitting balance , and Standing balance to improve functional Activity tolerance, Balance, Coordination, Mobility, and Strength. Therapeutic Exercise (10 Minutes): Therapeutic exercises noted below to improve functional activity tolerance, AROM, strength, and mobility.      TREATMENT GRID:   Date:  10/20/22 Date:  10/21/22 Date:  10/22 Date:  10/24/22 Date:  10/25/22   Activity/Exercise Parameters Parameters Parameters     RECLINED: ankle DF/PF 10 15  15         Hip AB/AD 10 15  15         Heels Slides 10AA 15           SLR 10 15           Hip Flexion    15 15   SITTING: LAQ 10 15 2 x 10 15 15   STANDING: Sit to stand   2 x 10 with UE support 5 10   Heel raises with BUE support   2 x 10         AFTER TREATMENT PRECAUTIONS: Bed/Chair Locked, Call light within reach, Chair, Needs within reach, RN notified, and Visitors at bedside    INTERDISCIPLINARY COLLABORATION:  RN/ PCT and OT/ CELIS    EDUCATION:      TIME IN/OUT:  Time In: 1030  Time Out: [facility] [address]  Minutes: [id number] [address], PT

## 2022-10-25 NOTE — PLAN OF CARE
Shift assessment complete. Patient is alert and oriented, in recliner. Respirations are even and unlabored. Patient is on room air. Bowel sounds are present. Patient has skin tear on right elbow, dressed. Dressing is clean, dry and intact. Patient denies pain at this time. Aguilar draining appropriately. Evening insulin held. . No needs expressed at this time. Call light within reach. Recliner wheels locked. __________________________________    Problem: Pain  Goal: Verbalizes/displays adequate comfort level or baseline comfort level  Outcome: Progressing     Problem: Safety - Adult  Goal: Free from fall injury  Outcome: Progressing     Problem: ABCDS Injury Assessment  Goal: Absence of physical injury  Outcome: Progressing     Problem: Skin/Tissue Integrity  Goal: Absence of new skin breakdown  Description: 1. Monitor for areas of redness and/or skin breakdown  2. Assess vascular access sites hourly  3. Every 4-6 hours minimum:  Change oxygen saturation probe site  4. Every 4-6 hours:  If on nasal continuous positive airway pressure, respiratory therapy assess nares and determine need for appliance change or resting period.   Outcome: Progressing     Problem: Respiratory - Adult  Goal: Achieves optimal ventilation and oxygenation  Outcome: Progressing

## 2022-10-25 NOTE — PROGRESS NOTES
TRANSFER - OUT REPORT:    Verbal report given to Nurse on Dwayne Seals  being transferred to [facility] for routine progression of patient care       Report consisted of patient's Situation, Background, Assessment and   Recommendations(SBAR). Information from the following report(s) Nurse Handoff Report was reviewed with the receiving nurse. Lines:       Opportunity for questions and clarification was provided.

## 2022-10-25 NOTE — CARE COORDINATION
10/25/22 1205   Service Assessment   Patient Orientation Alert and Oriented;Person;Place; Self   Cognition Alert   History Provided By Patient   Primary Caregiver Self   Support Systems Family Members  (sister Reji Todd)   [facility] is: Legal Next of Manjit Hensley   PCP Verified by CM Yes   Prior Functional Level Independent in ADLs/IADLs   Current Functional Level Independent in ADLs/IADLs   Can patient return to prior living arrangement Other (see comment)  (for rehab at [facility])   Ability to make needs known: Good   Family able to assist with home care needs: Yes   Would you like for me to discuss the discharge plan with any other family members/significant others, and if so, who? 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  No   Potential Assistance Purchasing Medications No   Patient expects to be discharged to: [facility] Discharge   Transition of Care Consult (CM Consult) Discharge Planning   Services At/After Discharge [facility] (SNF)   Mode of Transport at Discharge Other (see comment)  (Sister to transport to [facility])   Confirm Follow Up Transport Family   Condition of Participation: Discharge Planning   The Plan for Transition of Care is related to the following treatment goals: To [facility]   The Patient and/or Patient Representative was provided with a Choice of Provider? Patient   The Patient and/Or Patient Representative agree with the Discharge Plan? Yes   Freedom of Choice list was provided with basic dialogue that supports the patient's individualized plan of care/goals, treatment preferences, and shares the quality data associated with the providers? Yes   Interdisciplinary team rounds with Dr Soren Cornejo, REBECCA, nursing, PT and nutritional services for plan of care. Patient has been approved for rehab by [facility] at [facility]. Patient medically stable for d/c today. CM met with patient and spoke with sister Jenaro on phone about d/c today and they are in agreement with d/c today. Discussed ambulance transport vs sister transport and they have decided that his sister will provide transport to [facility]. CM provided packet to sister Jenaro at 46 am today and she is aware to give to the nurses on arrived. REBECCA notified liaison for [facility] that sister transporting and has packet with H/P, D/C summary, PPD, COVID, Passar and PT notes. Dr Soren Cornejo requested referral to [facility] for [facility] and CM spoke with Janine Desir at intake and gave information and sent referral to [phone] with confirmation received. Patient d/c with sister to [facility].

## 2022-10-25 NOTE — DISCHARGE SUMMARY
Hospitalist Discharge Summary   Admit Date:  10/18/2022 12:14 PM   DC Note date: 10/25/2022  Name:  Adrián Mejia   Age:  81 y.o. Sex:  male  :  1935   MRN:  [id number]   Room:  [facility]  PCP:  LISSY Duong NP    Presenting Complaint: Fatigue     Initial Admission Diagnosis: Generalized weakness [R53.1]  Closed nondisplaced fracture of anterior wall of right acetabulum, initial encounter ([address] [S32.414A]  Abdominal trauma, initial encounter [S39.91XA]  Inferior pubic ramus fracture, right, closed, initial encounter ([address] [S32.591A]  Acute renal failure, unspecified acute renal failure type ([address] [N17.9]     Problem List for this Hospitalization (present on admission):    Principal Problem (Resolved):    Sepsis with acute renal failure and tubular necrosis ([address]  Active Problems: Moderate dementia without behavioral disturbance, psychotic disturbance, mood disturbance, or anxiety    Closed nondisplaced fracture of anterior wall of right acetabulum (HCC)    Inferior pubic ramus fracture, right, closed, initial encounter ([address]    Generalized weakness    Closed nondisplaced fracture of anterior wall of right acetabulum with delayed healing    Urinary retention    Gout    3.4cm left adrenal mass noted incidentally on CT 10/18/22    Moderate protein-calorie malnutrition (HCC)    Body mass index (BMI) less than or equal to 19 in adult    Diabetes mellitus type 2, controlled (HCC)    Skin tear of elbow without complication    Chronic kidney disease, stage 3a ([address]    Normocytic anemia    Primary hypertension    Hyperlipidemia  Resolved Problems:    Abnormal CT abd with RUQ inflammation    Acute renal failure with acute tubular necrosis superimposed on stage 3a chronic kidney disease ([address]    Fall    Contusion of abdominal wall      Hospital Course:  87M PMHx  DM II, HTN, hyperkalemia secondary to bactrim use who presented with c/o generalized fatigue and increased urinary frequency.   Pt reported a fall backwards down his brick stairs about 2 weeks prior and c/o abd pain since. Started having urinary incontinence prior night. CT head neg for acute findings. CT c-spine neg for acute findings. CT chest/abd/pelvis with extensive findings of abdominal trauma (see full report below). Also found to have an right inferior pubic ramus fx and nondisplaced fx of the anterior wall of the right acetabulum. Ortho surgery (Dr. Lizzeth Franz) consulted per ER. General Surgery (Dr. Elly Green) contacted per ER. BC sent. Also with TIGIST with creatinine at 3.28 baseline around 1.3. Pt hemodynamically stable. Denies CP, SOB, n/v/d. Admitted with sepsis. TIGIST resolved. Surgeons opted for conservative management. Had haines placed for urinary retention. Urology consulted. Failed subsequent voiding trial. Haines reinserted. Worked with PT, OT. Underwent testing with SLP for cognitive function. Tests consistent with undifferentiated moderate dementia. Determined to be appropriate for discharge to short term rehab 10/25. Disposition: short term rehab  Diet: ADULT DIET; Regular; 4 carb choices (60 gm/meal); Low Potassium (Less than 3000 mg/day)  ADULT ORAL NUTRITION SUPPLEMENT; Lunch; Diabetic Oral Supplement  ADULT ORAL NUTRITION SUPPLEMENT; Lunch; Renal Oral Supplement  Code Status: Full Code    Follow Ups:   Contact information for follow-up providers     LISSY Hernández NP. Schedule an appointment as soon as   possible for a visit in 1 week(s). Specialty: Nurse Practitioner  Why: Transition of Care Management  Contact information:  [address]any [address] MD. Schedule an appointment as soon as possible for a   visit in 1 week(s). Specialty: Urology  Why: Evaluate urinary retention  Contact information:  [address]  Schedule an appointment as soon as possible for a visit in 3 day(s). Specialty: Wound Ostomy  Why: For wound re-check  Contact information:  John Calles [address] [facility] [address] [phone]                 Contact information for after-discharge care     [facility] [address]) . Service: Skilled Nursing  Contact information:  [address]  [address] [id number] [phone]                           Time spent in patient discharge and coordination 42 minutes. Follow up labs/diagnostics (ultimately defer to outpatient provider):  Cognitive testing and dementia classification  Urinary retention  Reevaluate doses of antihypertensives  Medication changes as noted below  Home fall risk assessment    Plan was discussed with patient, daughter, , nurse. All questions answered. Patient was stable at time of discharge. Instructions given to call a physician or return if any concerns.     Current Discharge Medication List        START taking these medications    Details   tamsulosin (FLOMAX) 0.4 MG capsule Take 1 capsule by mouth daily  Qty: 30 capsule, Refills: 0           CONTINUE these medications which have CHANGED    Details   furosemide (LASIX) 20 MG tablet Take 1 tablet by mouth daily  Qty: 30 tablet, Refills: 0      sodium zirconium cyclosilicate (LOKELMA) 5 g PACK oral suspension Take 5 g by mouth every other day  Qty: 15 packet, Refills: 0           CONTINUE these medications which have NOT CHANGED    Details   allopurinol (ZYLOPRIM) 100 MG tablet Take 50 mg by mouth daily      amLODIPine (NORVASC) 5 MG tablet Take 5 mg by mouth daily      aspirin 81 MG EC tablet Take 81 mg by mouth daily      atorvastatin (LIPITOR) 20 MG tablet Take 20 mg by mouth      cyanocobalamin 500 MCG tablet Take by mouth daily      SITagliptin (JANUVIA) 100 MG tablet Take 100 mg by mouth daily      metFORMIN (GLUCOPHAGE) 500 MG tablet Take by mouth 2 times daily (with meals)             Procedures done this admission:  * No surgery found *    Consults this admission:  IP CONSULT TO ORTHOPEDIC SURGERY  IP CONSULT TO GENERAL SURGERY  IP CONSULT TO UROLOGY  IP CONSULT TO PHYSICAL THERAPY  IP CONSULT TO OCCUPATIONAL THERAPY  IP CONSULT TO DIETITIAN  IP CONSULT TO PHYSICAL MEDICINE REHAB    Echocardiogram results:  No results found for this or any previous visit. Diagnostic Imaging/Tests:   XR ELBOW RIGHT (MIN 3 VIEWS)    Result Date: 10/18/2022  Negative for acute fracture. Osteoarthritis. CT Head W/O Contrast    Result Date: 10/18/2022  Negative for acute intracranial abnormality. Chronic changes. CT CSpine W/O Contrast    Result Date: 10/18/2022  1) Negative for acute cervical spine fracture. 2) Facet arthropathy and spondylosis and carotid atherosclerosis. 3) Chronic bronchiectasis and peribronchial thickening and chronic inflammatory change right lung apex. CT CHEST ABDOMEN PELVIS WO CONTRAST Additional Contrast? None    Result Date: 10/18/2022  Exam limited due to lack of intravenous contrast. 1) Nondisplaced fracture anterior lip right acetabulum and fracture inferior pubic ramus on the right with small amount of callus formation. 2) Stranding densities in the right upper quadrant, near the neck the gallbladder, first and second portion of the duodenum and in Morison's pouch. This could be acute inflammation or posttraumatic hemorrhage. 3) Stranding densities tract along the right abdomen down into the spermatic cord and upper scrotum on the right, inflammatory versus posttraumatic. 4) A 3.4 cm left adrenal mass, neoplasia versus adrenal hemorrhage. 5) Urinary bladder is quite distended.         Labs: Results:       BMP, Mg, Phos Recent Labs     10/23/22  0344 10/24/22  0348    138   K 4.7 4.6    103   CO2 28 28   ANIONGAP 4 7   BUN 28* 37*   CREATININE 1.06 1.18   LABGLOM >60 >60   CALCIUM 8.9 8.7   GLUCOSE 247* 189*      CBC Recent Labs 10/23/22  0344 10/24/22  0348 10/25/22  0349   WBC 7.1 7.6 9.0   RBC 3.10* 3.10* 3.04*   HGB 9.4* 9.4* 8.9*   HCT 29.6* 29.6* 29.0*   MCV 95.5 95.5 95.4   MCH 30.3 30.3 29.3   MCHC 31.8 31.8 30.7*   RDW 15.3* 15.1* 15.3*    211 225   MPV 10.8 10.1 10.6   NRBC 0.00 0.00 0.00   SEGS 67 60 58   LYMPHOPCT 14* 11* 13*   EOSRELPCT 2 9* 5   MONOPCT 8 7 12*   BASOPCT  --  2 1   SEGSABS 5.4 5.2 5.9   LYMPHSABS 1.0 0.8 1.2   EOSABS 0.1 0.7 0.5   MONOSABS 0.6 0.5 1.1   BASOSABS  --  0.2 0.1      LFT No results for input(s): BILITOT, BILIDIR, ALKPHOS, AST, ALT, PROT, LABALBU, GLOB in the last 72 hours.    Cardiac  No results found for: NTPROBNP, TROPHS   Coags No results found for: PROTIME, INR, APTT   A1c Lab Results   Component Value Date/Time    LABA1C 7.5 10/20/2022 03:17 AM     10/20/2022 03:17 AM      Lipids No results found for: CHOL, LDLCALC, LABVLDL, HDL, CHOLHDLRATIO, TRIG   Thyroid  Lab Results   Component Value Date/Time    TSHELE 2.25 10/20/2022 03:17 AM        Most Recent UA Lab Results   Component Value Date/Time    COLORU YELLOW/STRAW 10/19/2022 12:47 AM    APPEARANCE CLEAR 10/19/2022 12:47 AM    SPECGRAV 1.017 10/19/2022 12:47 AM    LABPH 5.0 10/19/2022 12:47 AM    PROTEINU Negative 10/19/2022 12:47 AM    GLUCOSEU Negative 10/19/2022 12:47 AM    KETUA Negative 10/19/2022 12:47 AM    BILIRUBINUR Negative 10/19/2022 12:47 AM    BILIRUBINUR Negative 01/25/2022 10:58 PM    BLOODU Negative 10/19/2022 12:47 AM    UROBILINOGEN 0.2 10/19/2022 12:47 AM    NITRU Negative 10/19/2022 12:47 AM    LEUKOCYTESUR Negative 10/19/2022 12:47 AM        Recent Labs     10/18/22  1230 10/18/22  1229   CULTURE NO GROWTH 5 DAYS NO GROWTH 5 DAYS       All Labs from Last 24 Hrs:  Recent Results (from the past 24 hour(s))   POCT Glucose    Collection Time: 10/24/22 11:10 AM   Result Value Ref Range    POC Glucose 269 (H) 65 - 100 mg/dL    Performed by: Meredith Prescott    POCT Glucose    Collection Time: 10/24/22  4:30 PM Result Value Ref Range    POC Glucose 215 (H) 65 - 100 mg/dL    Performed by: Erika    POCT Glucose    Collection Time: 10/24/22  8:48 PM   Result Value Ref Range    POC Glucose 203 (H) 65 - 100 mg/dL    Performed by: Pierce Ventura    CBC with Auto Differential    Collection Time: 10/25/22  3:49 AM   Result Value Ref Range    WBC 9.0 4.3 - 11.1 K/uL    RBC 3.04 (L) 4.23 - 5.6 M/uL    Hemoglobin 8.9 (L) 13.6 - 17.2 g/dL    Hematocrit 29.0 (L) 41.1 - 50.3 %    MCV 95.4 82.0 - 102.0 FL    MCH 29.3 26.1 - 32.9 PG    MCHC 30.7 (L) 31.4 - 35.0 g/dL    RDW 15.3 (H) 11.9 - 14.6 %    Platelets 656 554 - 102 K/uL    MPV 10.6 9.4 - 12.3 FL    nRBC 0.00 0.0 - 0.2 K/uL    Seg Neutrophils 58 47 - 75 %    Bands 6 0 - 6 %    Lymphocytes 13 (L) 16 - 44 %    Monocytes 12 (H) 3 - 9 %    Eosinophils % 5 1 - 8 %    Basophils 1 0 - 2 %    Metamyelocytes 1 %    Myelocytes 4 %    Segs Absolute 5.9 1.7 - 8.2 K/UL    Absolute Lymph # 1.2 0.5 - 4.6 K/UL    Absolute Mono # 1.1 0.1 - 1.3 K/UL    Absolute Eos # 0.5 0.0 - 0.8 K/UL    Basophils Absolute 0.1 0.0 - 0.2 K/UL    RBC Comment ANISOCYTOSIS      RBC Comment SLIGHT  POLYCHROMASIA        RBC Comment SLIGHT  MACROCYTOSIS        WBC Comment SLIGHT      Platelet Comment ADEQUATE      Differential Type MANUAL     POCT Glucose    Collection Time: 10/25/22  6:02 AM   Result Value Ref Range    POC Glucose 196 (H) 65 - 100 mg/dL    Performed by: Pierce Ventura    COVID-19, Rapid    Collection Time: 10/25/22 10:01 AM    Specimen: Nasopharyngeal   Result Value Ref Range    Source Nasopharyngeal      SARS-CoV-2, Rapid Not detected NOTD         No Known Allergies  Immunization History   Administered Date(s) Administered    COVID-19, MODERNA BLUE border, Primary or Immunocompromised, (age 12y+), IM, 100 mcg/0.5mL 01/20/2021, 02/17/2021, 10/28/2021    Influenza, FLUAD, (age 72 y+), Adjuvanted, 0.5mL 08/22/2020, 08/22/2021, 08/12/2022    Influenza, Triv, inactivated, subunit, adjuvanted, IM (Fluad 65 yrs and older) 10/11/2017, 08/27/2018, 08/24/2019    PPD Test 01/26/2022, 10/18/2022    Pneumococcal Conjugate 13-valent ([id number]) 11/11/2015, 08/03/2020    Pneumococcal Polysaccharide ([id number]) 06/27/2019    Tdap (Boostrix, Adacel) 12/06/2018       Recent Vital Data:  Patient Vitals for the past 24 hrs:   Temp Pulse Resp BP SpO2   10/25/22 0725 97.7 °F (36.5 °C) 85 16 (!) 113/50 95 %   10/25/22 0325 97.7 °F (36.5 °C) (!) 101 18 106/62 98 %   10/24/22 2347 97.7 °F (36.5 °C) 99 18 122/63 98 %   10/24/22 2003 97.7 °F (36.5 °C) (!) 102 18 114/62 95 %   10/24/22 1500 98.3 °F (36.8 °C) 96 14 107/61 96 %   10/24/22 1045 97.8 °F (36.6 °C) 99 16 (!) 113/57 97 %       Oxygen Therapy  SpO2: 95 %  Pulse Oximetry Type: Continuous  Pulse via Oximetry: 88 beats per minute  SPO2 High Alarm Limit: 100  SPO2 Low Alarm Limit POX: 90  Pulse Oximeter Device Mode: Continuous  Pulse Oximeter Device Location: Right, Hand, Finger  O2 Device: None (Room air)  Oximetry Probe Site Changed: No  Skin Assessment: Clean, dry, & intact  Skin Protection for O2 Device: N/A  O2 Flow Rate (L/min): 0 L/min  Oxygen Therapy: Supplemental oxygen  O2 Delivery Method: Nasal cannula    Estimated body mass index is 18.73 kg/m² as calculated from the following:    Height as of this encounter: 6' 1\" (1.854 m). Weight as of this encounter: 142 lb (64.4 kg). Intake/Output Summary (Last 24 hours) at 10/25/2022 1032  Last data filed at 10/25/2022 0600  Gross per 24 hour   Intake --   Output 1700 ml   Net -1700 ml       Physical Exam  Vitals and nursing note reviewed. Constitutional:       General: He is not in acute distress. Appearance: He is underweight. He is not diaphoretic. HENT:      Head: Normocephalic and atraumatic. Eyes:      Extraocular Movements: Extraocular movements intact. Cardiovascular:      Rate and Rhythm: Normal rate. Pulmonary:      Effort: Pulmonary effort is normal. No respiratory distress.    Abdominal: General: There is no distension. Musculoskeletal:         General: No deformity. Skin:     Coloration: Skin is not jaundiced or pale. Findings: Bruising present. Neurological:      General: No focal deficit present. Mental Status: He is alert and oriented to person, place, and time. Motor: Weakness present. Psychiatric:         Mood and Affect: Mood normal.         Behavior: Behavior normal. Behavior is cooperative. Cognition and Memory: Cognition is impaired. Memory is impaired.          Judgment: Judgment normal.         Signed:  Shala Robertson MD

## 2022-10-26 ENCOUNTER — CARE COORDINATION (OUTPATIENT)
Dept: CARE COORDINATION | Facility: CLINIC | Age: 87
End: 2022-10-26

## 2022-10-31 ENCOUNTER — HOSPITAL ENCOUNTER (OUTPATIENT)
Dept: WOUND CARE | Age: 87
Discharge: HOME | End: 2022-10-31
Payer: MEDICARE

## 2022-10-31 VITALS
DIASTOLIC BLOOD PRESSURE: 93 MMHG | HEART RATE: 92 BPM | SYSTOLIC BLOOD PRESSURE: 119 MMHG | BODY MASS INDEX: 18.82 KG/M2 | WEIGHT: 142 LBS | HEIGHT: 73 IN

## 2022-10-31 DIAGNOSIS — S51.011A SKIN TEAR OF RIGHT ELBOW WITHOUT COMPLICATION, INITIAL ENCOUNTER: Primary | ICD-10-CM

## 2022-10-31 PROBLEM — S51.019A SKIN TEAR OF ELBOW WITHOUT COMPLICATION: Chronic | Status: ACTIVE | Noted: 2022-10-13

## 2022-10-31 PROCEDURE — 17250 CHEM CAUT OF GRANLTJ TISSUE: CPT

## 2022-10-31 PROCEDURE — 17250 CHEM CAUT OF GRANLTJ TISSUE: CPT | Performed by: FAMILY MEDICINE

## 2022-10-31 RX ORDER — GENTAMICIN SULFATE 1 MG/G
OINTMENT TOPICAL ONCE
OUTPATIENT
Start: 2022-10-31 | End: 2022-10-31

## 2022-10-31 RX ORDER — GINSENG 100 MG
CAPSULE ORAL ONCE
OUTPATIENT
Start: 2022-10-31 | End: 2022-10-31

## 2022-10-31 RX ORDER — LIDOCAINE HYDROCHLORIDE 20 MG/ML
JELLY TOPICAL ONCE
OUTPATIENT
Start: 2022-10-31 | End: 2022-10-31

## 2022-10-31 RX ORDER — BACITRACIN ZINC AND POLYMYXIN B SULFATE 500; 1000 [USP'U]/G; [USP'U]/G
OINTMENT TOPICAL ONCE
OUTPATIENT
Start: 2022-10-31 | End: 2022-10-31

## 2022-10-31 RX ORDER — CLOBETASOL PROPIONATE 0.5 MG/G
OINTMENT TOPICAL ONCE
OUTPATIENT
Start: 2022-10-31 | End: 2022-10-31

## 2022-10-31 RX ORDER — LIDOCAINE HYDROCHLORIDE 20 MG/ML
JELLY TOPICAL ONCE
Status: COMPLETED | OUTPATIENT
Start: 2022-10-31 | End: 2022-10-31

## 2022-10-31 RX ORDER — LIDOCAINE 50 MG/G
OINTMENT TOPICAL ONCE
OUTPATIENT
Start: 2022-10-31 | End: 2022-10-31

## 2022-10-31 RX ORDER — LIDOCAINE 40 MG/G
CREAM TOPICAL ONCE
OUTPATIENT
Start: 2022-10-31 | End: 2022-10-31

## 2022-10-31 RX ORDER — BETAMETHASONE DIPROPIONATE 0.05 %
OINTMENT (GRAM) TOPICAL ONCE
OUTPATIENT
Start: 2022-10-31 | End: 2022-10-31

## 2022-10-31 RX ORDER — BACITRACIN, NEOMYCIN, POLYMYXIN B 400; 3.5; 5 [USP'U]/G; MG/G; [USP'U]/G
OINTMENT TOPICAL ONCE
OUTPATIENT
Start: 2022-10-31 | End: 2022-10-31

## 2022-10-31 RX ORDER — LIDOCAINE HYDROCHLORIDE 40 MG/ML
SOLUTION TOPICAL ONCE
OUTPATIENT
Start: 2022-10-31 | End: 2022-10-31

## 2022-10-31 RX ADMIN — LIDOCAINE HYDROCHLORIDE: 20 JELLY TOPICAL at 10:03

## 2022-10-31 NOTE — FLOWSHEET NOTE
10/31/22 1005   Wound 10/11/22 Arm Posterior;Right elbow   Date First Assessed/Time First Assessed: 10/11/22 0834   Present on Hospital Admission: Yes  Wound Approximate Age at First Assessment (Weeks): 1 weeks  Primary Wound Type: Traumatic  Location: Arm  Wound Location Orientation: Posterior;Right  Wound D... Wound Image    Wound Etiology Traumatic   Dressing Status Intact   Wound Cleansed Cleansed with saline   Dressing/Treatment Foam   Wound Length (cm) 2.4 cm   Wound Width (cm) 0.8 cm   Wound Depth (cm) 0.1 cm   Wound Surface Area (cm^2) 1.92 cm^2   Change in Wound Size % (l*w) 96.51   Wound Volume (cm^3) 0.192 cm^3   Wound Healing % 97   Wound Assessment Hyper granulation tissue;Granulation tissue   Drainage Description Serosanguinous   Odor None   Tammie-wound Assessment Fragile;Ecchymosis   Margins Attached edges   Wound Thickness Description not for Pressure Injury Full thickness   Pain Assessment   Pain Assessment None - Denies Pain   Patient is taking ASA daily.

## 2022-10-31 NOTE — DISCHARGE INSTRUCTIONS
right elbow:  Cleanse with normal saline or wound cleanser. Xeroform- apply to wound bed. Cover with rolled gauze or retention netting. Change three times per week (M,W,F). Do not get wound wet in shower, pool or tub. May purchase cast cover at local pharmacy to keep dry in shower. Increase dietary protein to 60 grams or more per day to help with tissue regrowth/ healing. May use liquid supplements such as Glucerna, Ensure Max, & Bhaskar. Silver Nitrate applied to hypergranulation today.

## 2022-10-31 NOTE — WOUND CARE
Discharge Instructions for  Aleena Webb  [address] [place] [address]  [address], [address]  Phone [phone]   Fax [phone]      NAME:  Radha Case OF BIRTH:  1935  MEDICAL RECORD NUMBER:  [id number]  DATE:  10/31/2022    Return Appointment:   1 week with Sandy Fishman, DO      Instructions:   right elbow:  Cleanse with normal saline or wound cleanser. Xeroform- apply to wound bed. Cover with rolled gauze or retention netting. Change three times per week (M,W,F). Do not get wound wet in shower, pool or tub. May purchase cast cover at local pharmacy to keep dry in shower. Increase dietary protein to 60 grams or more per day to help with tissue regrowth/ healing. May use liquid supplements such as Glucerna, Ensure Max, & Bhaskar. Silver Nitrate applied to hypergranulation today. Should you experience increased redness, swelling, pain, foul odor, size of wound(s), or have a temperature over 101 degrees please contact the [address] [place] [address] Road at [phone] or if after hours contact your primary care physician or go to the hospital emergency department. PLEASE NOTE: IF YOU ARE UNABLE TO OBTAIN WOUND SUPPLIES, CONTINUE TO USE THE SUPPLIES YOU HAVE AVAILABLE UNTIL YOU ARE ABLE TO REACH US. IT IS MOST IMPORTANT TO KEEP THE WOUND COVERED AT ALL TIMES. Electronically signed Sanchez Rodriguez.  Aiden Hernandez RN on 10/31/2022 at 10:14 AM

## 2022-10-31 NOTE — PROGRESS NOTES
Patient here follow-up regarding the right arm/elbow skin tear. He is now in a nursing facility. Border was on wound today. We had ordered Xeroform. Wound is better but there is 2 areas of hypergranulation tissue. We will utilize silver nitrate on those areas today. Reorder Xeroform gauze daily and follow-up in 1 week. Chemical Cautery  Performed by: David Betts DO  Consent obtained: Yes  Time out taken: Yes  Tissue Type: Hypergranulation  Pain Control:     Method: silver nitrate    Location of Chemical Cautery:   Wound/Ulcer #1  Procedural Pain: 0  / 10   Post Procedural Pain: 0 / 10   Response to treatment: Patient tolerated procedure well with no complaints of pain.

## 2022-11-08 ENCOUNTER — CARE COORDINATION (OUTPATIENT)
Dept: CARE COORDINATION | Facility: CLINIC | Age: 87
End: 2022-11-08

## 2022-11-08 NOTE — CARE COORDINATION
[facility] Update Call    2022    Patient: Samantha Miles Patient : 1935   MRN: [id number]  Reason for Admission: Sepsis with renal and tubular necrosis  Discharge Date: 10/25/22 RARS: Readmission Risk Score: 13.9         Care Transitions Post Acute Facility Update    Care Transitions Interventions  Post Acute Facility Update  CTN spoke with patient's sister. Patient remains at [facility]. Originally to discharge scheduled for 2022. Appeal filed by sister with [facility] and approved for continued stay. No plan for discharge at this time. Patient's sister agrees to contact CTN if patient is scheduled for discharge prior to next outreach.

## 2022-12-29 ENCOUNTER — HOSPITAL ENCOUNTER (OUTPATIENT)
Dept: LAB | Age: 87
Discharge: HOME | End: 2023-01-01

## 2022-12-29 LAB
ANION GAP SERPL CALC-SCNC: 6 MMOL/L (ref 2–11)
BASOPHILS # BLD: 0 K/UL (ref 0–0.2)
BASOPHILS NFR BLD: 0 % (ref 0–2)
BUN SERPL-MCNC: 30 MG/DL (ref 8–23)
CALCIUM SERPL-MCNC: 8.3 MG/DL (ref 8.3–10.4)
CHLORIDE SERPL-SCNC: 107 MMOL/L (ref 101–110)
CO2 SERPL-SCNC: 28 MMOL/L (ref 21–32)
CREAT SERPL-MCNC: 1 MG/DL (ref 0.8–1.5)
DIFFERENTIAL METHOD BLD: ABNORMAL
EOSINOPHIL # BLD: 0 K/UL (ref 0–0.8)
EOSINOPHIL NFR BLD: 0 % (ref 0.5–7.8)
ERYTHROCYTE [DISTWIDTH] IN BLOOD BY AUTOMATED COUNT: 16.3 % (ref 11.9–14.6)
GLUCOSE SERPL-MCNC: 310 MG/DL (ref 65–100)
HCT VFR BLD AUTO: 30 % (ref 41.1–50.3)
HGB BLD-MCNC: 9 G/DL (ref 13.6–17.2)
IMM GRANULOCYTES # BLD AUTO: 0.7 K/UL (ref 0–0.5)
IMM GRANULOCYTES NFR BLD AUTO: 9 % (ref 0–5)
LYMPHOCYTES # BLD: 0.7 K/UL (ref 0.5–4.6)
LYMPHOCYTES NFR BLD: 8 % (ref 13–44)
MCH RBC QN AUTO: 27.4 PG (ref 26.1–32.9)
MCHC RBC AUTO-ENTMCNC: 30 G/DL (ref 31.4–35)
MCV RBC AUTO: 91.2 FL (ref 82–102)
MONOCYTES # BLD: 0.6 K/UL (ref 0.1–1.3)
MONOCYTES NFR BLD: 7 % (ref 4–12)
NEUTS SEG # BLD: 6.3 K/UL (ref 1.7–8.2)
NEUTS SEG NFR BLD: 76 % (ref 43–78)
NRBC # BLD: 0 K/UL (ref 0–0.2)
PLATELET # BLD AUTO: 299 K/UL (ref 150–450)
PLATELET COMMENT: ADEQUATE
PMV BLD AUTO: 10.3 FL (ref 9.4–12.3)
POTASSIUM SERPL-SCNC: 4.2 MMOL/L (ref 3.5–5.1)
RBC # BLD AUTO: 3.29 M/UL (ref 4.23–5.6)
RBC MORPH BLD: ABNORMAL
RBC MORPH BLD: ABNORMAL
SODIUM SERPL-SCNC: 141 MMOL/L (ref 133–143)
WBC # BLD AUTO: 8.3 K/UL (ref 4.3–11.1)
WBC MORPH BLD: ABNORMAL

## 2022-12-29 PROCEDURE — 80048 BASIC METABOLIC PNL TOTAL CA: CPT

## 2022-12-29 PROCEDURE — 85025 COMPLETE CBC W/AUTO DIFF WBC: CPT

## 2023-02-16 ENCOUNTER — HOSPITAL ENCOUNTER (OUTPATIENT)
Dept: LAB | Age: 88
Discharge: HOME | End: 2023-02-19

## 2023-02-16 LAB
BASOPHILS # BLD: 0 K/UL (ref 0–0.2)
BASOPHILS NFR BLD: 0 % (ref 0–2)
DIFFERENTIAL METHOD BLD: ABNORMAL
EOSINOPHIL # BLD: 0.1 K/UL (ref 0–0.8)
EOSINOPHIL NFR BLD: 1 % (ref 0.5–7.8)
ERYTHROCYTE [DISTWIDTH] IN BLOOD BY AUTOMATED COUNT: 17.8 % (ref 11.9–14.6)
HCT VFR BLD AUTO: 29.2 % (ref 41.1–50.3)
HGB BLD-MCNC: 8.5 G/DL (ref 13.6–17.2)
IMM GRANULOCYTES # BLD AUTO: 0.6 K/UL (ref 0–0.5)
IMM GRANULOCYTES NFR BLD AUTO: 5 % (ref 0–5)
LYMPHOCYTES # BLD: 1.1 K/UL (ref 0.5–4.6)
LYMPHOCYTES NFR BLD: 9 % (ref 13–44)
MCH RBC QN AUTO: 27.2 PG (ref 26.1–32.9)
MCHC RBC AUTO-ENTMCNC: 29.1 G/DL (ref 31.4–35)
MCV RBC AUTO: 93.3 FL (ref 82–102)
MONOCYTES # BLD: 0.9 K/UL (ref 0.1–1.3)
MONOCYTES NFR BLD: 7 % (ref 4–12)
NEUTS SEG # BLD: 9.5 K/UL (ref 1.7–8.2)
NEUTS SEG NFR BLD: 78 % (ref 43–78)
NRBC # BLD: 0 K/UL (ref 0–0.2)
PLATELET # BLD AUTO: 281 K/UL (ref 150–450)
PLATELET COMMENT: ADEQUATE
PMV BLD AUTO: 10.2 FL (ref 9.4–12.3)
RBC # BLD AUTO: 3.13 M/UL (ref 4.23–5.6)
RBC MORPH BLD: ABNORMAL
WBC # BLD AUTO: 12.2 K/UL (ref 4.3–11.1)
WBC MORPH BLD: ABNORMAL

## 2023-02-16 PROCEDURE — 85025 COMPLETE CBC W/AUTO DIFF WBC: CPT
